# Patient Record
Sex: FEMALE | Race: WHITE | NOT HISPANIC OR LATINO | Employment: OTHER | URBAN - METROPOLITAN AREA
[De-identification: names, ages, dates, MRNs, and addresses within clinical notes are randomized per-mention and may not be internally consistent; named-entity substitution may affect disease eponyms.]

---

## 2017-02-02 ENCOUNTER — LAB VISIT (OUTPATIENT)
Dept: LAB | Facility: HOSPITAL | Age: 67
End: 2017-02-02
Payer: MEDICARE

## 2017-02-02 DIAGNOSIS — E78.00 HYPERCHOLESTEROLEMIA: ICD-10-CM

## 2017-02-02 LAB
ALBUMIN SERPL BCP-MCNC: 3.8 G/DL
ALP SERPL-CCNC: 57 U/L
ALT SERPL W/O P-5'-P-CCNC: 29 U/L
ANION GAP SERPL CALC-SCNC: 9 MMOL/L
AST SERPL-CCNC: 23 U/L
BILIRUB SERPL-MCNC: 0.6 MG/DL
BUN SERPL-MCNC: 22 MG/DL
CALCIUM SERPL-MCNC: 9.1 MG/DL
CHLORIDE SERPL-SCNC: 109 MMOL/L
CHOLEST/HDLC SERPL: 3.1 {RATIO}
CK SERPL-CCNC: 53 U/L
CO2 SERPL-SCNC: 24 MMOL/L
CREAT SERPL-MCNC: 0.9 MG/DL
EST. GFR  (AFRICAN AMERICAN): >60 ML/MIN/1.73 M^2
EST. GFR  (NON AFRICAN AMERICAN): >60 ML/MIN/1.73 M^2
GLUCOSE SERPL-MCNC: 97 MG/DL
HDL/CHOLESTEROL RATIO: 32.6 %
HDLC SERPL-MCNC: 175 MG/DL
HDLC SERPL-MCNC: 57 MG/DL
LDLC SERPL CALC-MCNC: 101.6 MG/DL
NONHDLC SERPL-MCNC: 118 MG/DL
POTASSIUM SERPL-SCNC: 4.4 MMOL/L
PROT SERPL-MCNC: 6.5 G/DL
SODIUM SERPL-SCNC: 142 MMOL/L
TRIGL SERPL-MCNC: 82 MG/DL

## 2017-02-02 PROCEDURE — 80061 LIPID PANEL: CPT

## 2017-02-02 PROCEDURE — 80053 COMPREHEN METABOLIC PANEL: CPT

## 2017-02-02 PROCEDURE — 82550 ASSAY OF CK (CPK): CPT

## 2017-02-02 PROCEDURE — 36415 COLL VENOUS BLD VENIPUNCTURE: CPT

## 2017-02-06 ENCOUNTER — OFFICE VISIT (OUTPATIENT)
Dept: INFECTIOUS DISEASES | Facility: CLINIC | Age: 67
End: 2017-02-06
Payer: MEDICARE

## 2017-02-06 VITALS
BODY MASS INDEX: 19.57 KG/M2 | HEIGHT: 63 IN | SYSTOLIC BLOOD PRESSURE: 125 MMHG | DIASTOLIC BLOOD PRESSURE: 79 MMHG | WEIGHT: 110.44 LBS | TEMPERATURE: 98 F | HEART RATE: 84 BPM

## 2017-02-06 DIAGNOSIS — C50.412 BREAST CANCER OF UPPER-OUTER QUADRANT OF LEFT FEMALE BREAST: ICD-10-CM

## 2017-02-06 DIAGNOSIS — E78.00 PURE HYPERCHOLESTEROLEMIA: ICD-10-CM

## 2017-02-06 DIAGNOSIS — I25.10 ARTERIOSCLEROTIC CORONARY ARTERY DISEASE: Primary | ICD-10-CM

## 2017-02-06 PROCEDURE — 99213 OFFICE O/P EST LOW 20 MIN: CPT | Mod: PBBFAC | Performed by: INTERNAL MEDICINE

## 2017-02-06 PROCEDURE — 99214 OFFICE O/P EST MOD 30 MIN: CPT | Mod: S$PBB,,, | Performed by: INTERNAL MEDICINE

## 2017-02-06 PROCEDURE — 99999 PR PBB SHADOW E&M-EST. PATIENT-LVL III: CPT | Mod: PBBFAC,,, | Performed by: INTERNAL MEDICINE

## 2017-02-06 RX ORDER — ATORVASTATIN CALCIUM 20 MG/1
20 TABLET, FILM COATED ORAL DAILY
Qty: 30 TABLET | Refills: 11 | Status: SHIPPED | OUTPATIENT
Start: 2017-02-06 | End: 2018-02-11 | Stop reason: SDUPTHER

## 2017-02-06 NOTE — MR AVS SNAPSHOT
[facility] - Infectious Diseases  [address] [facility] [address]  Phone: [phone]  Fax: [phone]                  Saritha Guzman   2017 9:00 AM   Office Visit    Description:  Female : 1950   Provider:  Marlon Richardson MD   Department:  [facility] - Infectious Diseases           Reason for Visit     Follow-up                To Do List           Future Appointments        Provider Department Dept Phone    2017 10:30 AM NA [facility] - Hematology Oncology [phone]      Goals (5 Years of Data)     None       These Medications        Disp Refills Start End    atorvastatin (LIPITOR) 20 MG tablet 30 tablet 11 2017    Take 1 tablet (20 mg total) by mouth once daily. - Oral    Pharmacy: [facility] [address] [place] [address] [employer]  AT [facility] Ph #: [phone]         [facility] On Call     [facility] On Call Nurse Care Line -  Assistance  Registered nurses in the [facility] On Call Center provide clinical advisement, health education, appointment booking, and other advisory services.  Call for this free service at [phone].             Medications           Message regarding Medications     Verify the changes and/or additions to your medication regime listed below are the same as discussed with your clinician today.  If any of these changes or additions are incorrect, please notify your healthcare provider.        START taking these NEW medications        Refills    atorvastatin (LIPITOR) 20 MG tablet 11    Sig: Take 1 tablet (20 mg total) by mouth once daily.    Class: Normal    Route: Oral           Verify that the below list of medications is an accurate representation of the medications you are currently taking.  If none reported, the list may be blank. If incorrect, please contact your healthcare provider. Carry this list with you in case of emergency.           Current Medications      "ascorbic acid (VITAMIN C) 1000 MG tablet Take 1,000 mg by mouth once daily.    calcium-vitamin D3 (CALCIUM 500 + D) 500 mg(1,250mg) -200 unit per tablet Take 1 tablet by mouth 2 (two) times daily with meals.    cyanocobalamin (VITAMIN B-12) 100 MCG tablet Take 100 mcg by mouth once daily.    DOCOSAHEXANOIC ACID/EPA (FISH OIL ORAL) Take by mouth.    FLUZONE HIGH-DOSE 2016-17, PF, 180 mcg/0.5 mL Syrg ADM 0.5ML IM UTD    letrozole (FEMARA) 2.5 mg Tab Take 2.5 mg by mouth once daily.    lorazepam (ATIVAN) 1 MG tablet Take 1 tablet (1 mg total) by mouth every 12 (twelve) hours as needed.    magnesium oxide (MAGOX) 400 mg tablet Take 1 tablet (400 mg total) by mouth 2 (two) times daily.    MULTIVITAMIN ORAL Take by mouth.    RED YEAST RICE ORAL Take 1,200 mg by mouth.    VAGIFEM 10 mcg Tab INSERT 1 TABLET VAGINALLY EVERY DAY FOR 1 WEEK, THEN TWICE A WEEK    atorvastatin (LIPITOR) 20 MG tablet Take 1 tablet (20 mg total) by mouth once daily.           Clinical Reference Information           Your Vitals Were     BP Pulse Temp Height Weight BMI    125/79 (BP Location: Left arm, Patient Position: Sitting) 84 97.8 °F (36.6 °C) (Oral) 5' 3" (1.6 m) 50.1 kg (110 lb 7.2 oz) 19.57 kg/m2      Blood Pressure          Most Recent Value    BP  125/79      Allergies as of 2/6/2017     Codeine    Corticosteroids (Glucocorticoids)    Penicillins    Tetracyclines      Immunizations Administered on Date of Encounter - 2/6/2017     None      Language Assistance Services     ATTENTION: Language assistance services are available, free of charge. Please call [phone].      ATENCIÓN: Si monyla brady, tiene a mcgregor disposición servicios gratuitos de asistencia lingüística. Llame al [phone].     HUEY Ý: N?u b?n nói Ti?ng Vi?t, có các d?ch v? h? tr? ngôn ng? mi?n phí dành cho b?n. G?i s? [phone].         [facility] - [facility]ious [facility] complies with applicable Federal civil rights laws and does not discriminate on the basis of " race, color, national origin, age, disability, or sex.

## 2017-02-06 NOTE — PROGRESS NOTES
Subjective:      Patient ID: Saritha Guzman is a 66 y.o. female.    Chief Complaint:Follow-up      History of Present Illness    Has positive coronary calcium score and strong family hx of CAD. She was placed on atorvastatin 10 mg in mid Dec and has had no tolerance problems. Marked improvement in her lipid profile:  Lab Results   Component Value Date    CHOL 175 02/02/2017    CHOL 284 (H) 10/25/2016    CHOL 230 (H) 08/12/2015     Lab Results   Component Value Date    HDL 57 02/02/2017    HDL 61 10/25/2016    HDL 58 08/12/2015     Lab Results   Component Value Date    LDLCALC 101.6 02/02/2017    LDLCALC 201.2 (H) 10/25/2016    LDLCALC 152.0 08/12/2015     Lab Results   Component Value Date    TRIG 82 02/02/2017    TRIG 109 10/25/2016    TRIG 100 08/12/2015     Lab Results   Component Value Date    CHOLHDL 32.6 02/02/2017    CHOLHDL 21.5 10/25/2016    CHOLHDL 25.2 08/12/2015     Since target LDL is 70 will double her atorvastatin dose and reassess in 2 months. She inquired about CoQ 10 which I recommended she start.    Review of Systems   Constitution: Positive for night sweats. Negative for chills, decreased appetite, fever, weakness, malaise/fatigue, weight gain and weight loss.   HENT: Positive for congestion. Negative for ear pain, headaches, hearing loss, hoarse voice, sore throat and tinnitus.    Eyes: Negative for blurred vision, redness and visual disturbance.   Cardiovascular: Negative for chest pain, leg swelling and palpitations.   Respiratory: Negative for cough, hemoptysis, shortness of breath and sputum production.    Hematologic/Lymphatic: Negative for adenopathy. Does not bruise/bleed easily.   Skin: Positive for dry skin. Negative for itching, rash and suspicious lesions.   Musculoskeletal: Positive for back pain. Negative for joint pain, myalgias and neck pain.   Gastrointestinal: Positive for diarrhea. Negative for abdominal pain, constipation, heartburn, nausea and vomiting.   Genitourinary:  Negative for dysuria, flank pain, frequency, hematuria, hesitancy and urgency.   Neurological: Negative for dizziness, numbness and paresthesias.   Psychiatric/Behavioral: Negative for depression and memory loss. The patient is nervous/anxious. The patient does not have insomnia.      Objective:   Physical Exam   Vitals reviewed.    Assessment:       1. Arteriosclerotic coronary artery disease    2. Pure hypercholesterolemia    3. Breast cancer of upper-outer quadrant of left female breast          Plan:        1. See HPI

## 2017-02-08 ENCOUNTER — OFFICE VISIT (OUTPATIENT)
Dept: OPHTHALMOLOGY | Facility: CLINIC | Age: 67
End: 2017-02-08
Payer: MEDICARE

## 2017-02-08 DIAGNOSIS — H00.13 CHALAZION, RIGHT: Primary | ICD-10-CM

## 2017-02-08 PROCEDURE — 99212 OFFICE O/P EST SF 10 MIN: CPT | Mod: PBBFAC

## 2017-02-08 PROCEDURE — 92002 INTRM OPH EXAM NEW PATIENT: CPT | Mod: S$PBB,,,

## 2017-02-08 PROCEDURE — 99999 PR PBB SHADOW E&M-EST. PATIENT-LVL II: CPT | Mod: PBBFAC,,,

## 2017-02-08 NOTE — MR AVS SNAPSHOT
[facility] - Ophthalmology  [address]  [facility] [address]  Phone: [phone]  Fax: [phone]                  Saritha Guzman   2017 10:15 AM   Office Visit    Description:  Female : 1950   Provider:  Cuco Cyr MD   Department:  [facility] - Ophthalmology           Reason for Visit     Stye           Diagnoses this Visit        Comments    Chalazion, right    -  Primary            To Do List           Future Appointments        Provider Department Dept Phone    2017 10:30 AM NA [facility] - Hematology Oncology [phone]    3/13/2017 7:30 AM LAB, APPOINTMENT [place] [facility] [phone]    3/13/2017 9:00 AM Marlon Richardson MD [facility] - Infectious Diseases [phone]      Goals (5 Years of Data)     None      [facility]     [facility] On Call Nurse Care Line -  Assistance  Registered nurses in the [facility] On Call Center provide clinical advisement, health education, appointment booking, and other advisory services.  Call for this free service at [phone].             Medications           Message regarding Medications     Verify the changes and/or additions to your medication regime listed below are the same as discussed with your clinician today.  If any of these changes or additions are incorrect, please notify your healthcare provider.        STOP taking these medications     FLUZONE HIGH-DOSE -, PF, 180 mcg/0.5 mL Syrg ADM 0.5ML IM UTD    RED YEAST RICE ORAL Take 1,200 mg by mouth.           Verify that the below list of medications is an accurate representation of the medications you are currently taking.  If none reported, the list may be blank. If incorrect, please contact your healthcare provider. Carry this list with you in case of emergency.           Current Medications     ascorbic acid (VITAMIN C) 1000 MG tablet Take 1,000 mg by mouth once daily.    atorvastatin (LIPITOR) 20 MG tablet  Take 1 tablet (20 mg total) by mouth once daily.    calcium-vitamin D3 (CALCIUM 500 + D) 500 mg(1,250mg) -200 unit per tablet Take 1 tablet by mouth 2 (two) times daily with meals.    cyanocobalamin (VITAMIN B-12) 100 MCG tablet Take 100 mcg by mouth once daily.    DOCOSAHEXANOIC ACID/EPA (FISH OIL ORAL) Take by mouth.    letrozole (FEMARA) 2.5 mg Tab Take 2.5 mg by mouth once daily.    lorazepam (ATIVAN) 1 MG tablet Take 1 tablet (1 mg total) by mouth every 12 (twelve) hours as needed.    magnesium oxide (MAGOX) 400 mg tablet Take 1 tablet (400 mg total) by mouth 2 (two) times daily.    MULTIVITAMIN ORAL Take by mouth.    VAGIFEM 10 mcg Tab INSERT 1 TABLET VAGINALLY EVERY DAY FOR 1 WEEK, THEN TWICE A WEEK           Clinical Reference Information           Allergies as of 2/8/2017     Codeine    Corticosteroids (Glucocorticoids)    Penicillins    Tetracyclines      Immunizations Administered on Date of Encounter - 2/8/2017     None      Instructions    Use ointment and warm compress 3-4 times a day.        Language Assistance Services     ATTENTION: Language assistance services are available, free of charge. Please call [phone].      ATENCIÓN: Si anna marie brady, tiene a mcgregor disposición servicios gratuitos de asistencia lingüística. Llame al [phone].     HUEY Ý: N?u b?n nói Ti?ng Vi?t, có các d?ch v? h? tr? ngôn ng? mi?n phí dành cho b?n. G?i s? [phone].         [facility] complies with applicable Federal civil rights laws and does not discriminate on the basis of race, color, national origin, age, disability, or sex.

## 2017-02-08 NOTE — PROGRESS NOTES
HPI     Patient complaints of OD tender and swollen w/ discharge x2d. Pt states   has gotten worse very quickly very sore and red. Pt started warm   compresses qhs on yesterday.            Last edited by Daniel Acuna MA on 2/8/2017 10:47 AM.         Assessment /Plan     For exam results, see Encounter Report.    [place], right        I have reviewed the patient history,review of systems,and findings as recorded by the technician and resident and accept.  Subacute chalazion lat RLL. Rx arm comp and maxitrol ungt 3-4 times daily.

## 2017-02-21 ENCOUNTER — OFFICE VISIT (OUTPATIENT)
Dept: HEMATOLOGY/ONCOLOGY | Facility: CLINIC | Age: 67
End: 2017-02-21
Payer: MEDICARE

## 2017-02-21 VITALS
DIASTOLIC BLOOD PRESSURE: 61 MMHG | SYSTOLIC BLOOD PRESSURE: 116 MMHG | WEIGHT: 110.69 LBS | TEMPERATURE: 98 F | HEART RATE: 85 BPM | RESPIRATION RATE: 16 BRPM | BODY MASS INDEX: 19.6 KG/M2

## 2017-02-21 DIAGNOSIS — C50.412 BREAST CANCER OF UPPER-OUTER QUADRANT OF LEFT FEMALE BREAST: Primary | ICD-10-CM

## 2017-02-21 PROCEDURE — 99213 OFFICE O/P EST LOW 20 MIN: CPT | Mod: PBBFAC | Performed by: PHYSICIAN ASSISTANT

## 2017-02-21 PROCEDURE — 99213 OFFICE O/P EST LOW 20 MIN: CPT | Mod: S$PBB,,, | Performed by: PHYSICIAN ASSISTANT

## 2017-02-21 PROCEDURE — 99999 PR PBB SHADOW E&M-EST. PATIENT-LVL III: CPT | Mod: PBBFAC,,, | Performed by: PHYSICIAN ASSISTANT

## 2017-02-21 RX ORDER — ACETAMINOPHEN 160 MG/5ML
200 SUSPENSION, ORAL (FINAL DOSE FORM) ORAL DAILY
COMMUNITY

## 2017-02-21 NOTE — PROGRESS NOTES
Subjective:       Patient ID: Saritha Guzman is a 66 y.o. female.    Chief Complaint: Breast Cancer    HPI Comments: Ms Guzman is a 67 y/o WF Is seen for F/U of left breast cancer. She had stage I ER positive disease with a high Oncotype score. She is currently on letrozole.    She is feeling well and is without complaints, specifically no fever, chills, nausea, vomiting, shortness of breath or breast pain/complaints.  Patient recently found to have high cholesterol, which has been greatly improved with lipitor.    Bilateral breast MRI in 12/2016 was negative.     History: mammogram showed pleomorphic calcifications left breast at the 2:00 position. By ultrasound there was a 2.4 so that the asymmetric density. She was having no breast symptoms at that time. A core needle biopsy showed LCIS and an MRI of the breast showed an irregular mass at the 2:00 position measured 3.9 x 2.1 cm with no adenopathy. 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 On July 12 Lumpectomy showed multiple foci of infiltrating lobular carcinoma, With the largest measuring 1.1 cm. histologic grade 3 nuclear grade 2 mitotic index 2. The tumor was 50% ER positive-which would've moderate, VA negative and HER-2 negative. Subsequently she underwent a left mastectomy with sentinel lymph node biopsy which showed only some residual LCIS. The sentinel lymph node was negative. Final pathological stage TI CN0 stage IA. Oncotype score was high. She had 4 cycles of adjuvant Taxotere and Cytoxan completed January 2014 and then began adjuvant letrozole therapy.    Review of Systems   Constitutional: Negative.    HENT: Negative for mouth sores, sore throat and trouble swallowing.    Eyes: Negative for visual disturbance.   Respiratory: Negative for cough, chest tightness and shortness of breath.    Cardiovascular: Negative for chest pain and leg swelling.   Gastrointestinal: Negative for  abdominal distention, abdominal pain, blood in stool, constipation, diarrhea, nausea and vomiting.   Genitourinary: Negative for dysuria and hematuria.   Musculoskeletal: Negative for arthralgias, back pain and myalgias.   Skin: Negative for pallor and rash.   Neurological: Negative for dizziness, weakness and light-headedness.   Hematological: Negative for adenopathy. Does not bruise/bleed easily.   Psychiatric/Behavioral: Negative for dysphoric mood and suicidal ideas. The patient is not nervous/anxious.        Objective:      Physical Exam   Constitutional: She is oriented to person, place, and time. She appears well-developed and well-nourished. No distress.   ECOG 0   HENT:   Head: Normocephalic.   Mouth/Throat: Oropharynx is clear and moist. No oropharyngeal exudate.   Eyes: Conjunctivae are normal. No scleral icterus.   Neck: Normal range of motion. Neck supple. No thyromegaly present.   Pulmonary/Chest: Effort normal and breath sounds normal. No respiratory distress.   Right breast without mass or nodule. Left breast reconstruction shows well healed lateral incision, no mass or nodule. Implant with some mild contracture at lateral incision line No axillary or supraclavicular adenopathy bilaterally.    Abdominal: Soft. Bowel sounds are normal. She exhibits no distension and no mass. There is no tenderness.   No hepatosplenomegaly   Musculoskeletal: Normal range of motion. She exhibits no edema.   No spinal or paraspinal tenderness to palpation     Lymphadenopathy:     She has no cervical adenopathy.   Neurological: She is alert and oriented to person, place, and time. No cranial nerve deficit.   Skin: Skin is warm and dry. No rash noted. No erythema.   Psychiatric: She has a normal mood and affect. Her behavior is normal. Judgment and thought content normal.   Vitals reviewed.      Assessment:       66 year old female with stage IA (ER positive) breast cancer, currently on Femara and tolerating that treatment  well.  Patient with osteoporosis treated with Reclast, due 11/2017.        PLAN:  Continue Femara and return to clinic in 4 months.  Patient gets alternating MRI/mammogram every 6 months, due for mammogram in June.

## 2017-03-13 ENCOUNTER — OFFICE VISIT (OUTPATIENT)
Dept: INFECTIOUS DISEASES | Facility: CLINIC | Age: 67
End: 2017-03-13
Payer: MEDICARE

## 2017-03-13 ENCOUNTER — PATIENT MESSAGE (OUTPATIENT)
Dept: INFECTIOUS DISEASES | Facility: CLINIC | Age: 67
End: 2017-03-13

## 2017-03-13 VITALS
HEIGHT: 63 IN | DIASTOLIC BLOOD PRESSURE: 71 MMHG | BODY MASS INDEX: 20 KG/M2 | WEIGHT: 112.88 LBS | SYSTOLIC BLOOD PRESSURE: 114 MMHG | HEART RATE: 88 BPM | TEMPERATURE: 98 F

## 2017-03-13 DIAGNOSIS — I25.10 ARTERIOSCLEROTIC CORONARY ARTERY DISEASE: ICD-10-CM

## 2017-03-13 PROCEDURE — 99213 OFFICE O/P EST LOW 20 MIN: CPT | Mod: PBBFAC | Performed by: INTERNAL MEDICINE

## 2017-03-13 PROCEDURE — 99213 OFFICE O/P EST LOW 20 MIN: CPT | Mod: S$PBB,,, | Performed by: INTERNAL MEDICINE

## 2017-03-13 PROCEDURE — 99999 PR PBB SHADOW E&M-EST. PATIENT-LVL III: CPT | Mod: PBBFAC,,, | Performed by: INTERNAL MEDICINE

## 2017-03-13 NOTE — ASSESSMENT & PLAN NOTE
Pt has strong family history of stroke. In 2008 she had a coronary calcium score which showed her to be between the 50-75%ile for age/gender. She recalls being seen by Dr. Sanchez and given crestor which made her joints hurt. None of this is viewable in existing computer records, so will request record from [email or web address] to review. She was started on letrozole Jan 2014 and has had a big jump in her cholesterol and LDL. Was started on atorvastatin 10 mg with big drop in total from 284 to 175 and the dose increased to 20 mg with further drop  Lab Results   Component Value Date    CHOL 158 03/13/2017    CHOL 175 02/02/2017    CHOL 284 (H) 10/25/2016     Lab Results   Component Value Date    HDL 51 03/13/2017    HDL 57 02/02/2017    HDL 61 10/25/2016     Lab Results   Component Value Date    LDLCALC 90.4 03/13/2017    LDLCALC 101.6 02/02/2017    LDLCALC 201.2 (H) 10/25/2016     Lab Results   Component Value Date    TRIG 83 03/13/2017    TRIG 82 02/02/2017    TRIG 109 10/25/2016     Lab Results   Component Value Date    CHOLHDL 32.3 03/13/2017    CHOLHDL 32.6 02/02/2017    CHOLHDL 21.5 10/25/2016

## 2017-03-13 NOTE — MR AVS SNAPSHOT
[facility] - Infectious Diseases  [address]  [facility] [address]  Phone: [phone]  Fax: [phone]                  Saritha Guzman   3/13/2017 9:00 AM   Office Visit    Description:  Female : 1950   Provider:  Marlon Richardson MD   Department:  [facility] - Infectious Diseases           Reason for Visit     Follow-up                To Do List           Goals (5 Years of Data)     None      [facility] On Call     [facility] On Call Nurse Care Line -  Assistance  Registered nurses in the [facility] On Call Center provide clinical advisement, health education, appointment booking, and other advisory services.  Call for this free service at [phone].             Medications           Message regarding Medications     Verify the changes and/or additions to your medication regime listed below are the same as discussed with your clinician today.  If any of these changes or additions are incorrect, please notify your healthcare provider.             Verify that the below list of medications is an accurate representation of the medications you are currently taking.  If none reported, the list may be blank. If incorrect, please contact your healthcare provider. Carry this list with you in case of emergency.           Current Medications     ascorbic acid (VITAMIN C) 1000 MG tablet Take 1,000 mg by mouth once daily.    atorvastatin (LIPITOR) 20 MG tablet Take 1 tablet (20 mg total) by mouth once daily.    calcium-vitamin D3 (CALCIUM 500 + D) 500 mg(1,250mg) -200 unit per tablet Take 1 tablet by mouth 2 (two) times daily with meals.    coenzyme Q10 (CO Q-10) 200 mg capsule Take 200 mg by mouth once daily.    cyanocobalamin (VITAMIN B-12) 100 MCG tablet Take 100 mcg by mouth once daily.    DOCOSAHEXANOIC ACID/EPA (FISH OIL ORAL) Take by mouth.    letrozole (FEMARA) 2.5 mg Tab Take 2.5 mg by mouth once daily.    lorazepam (ATIVAN) 1 MG tablet Take 1 tablet (1 mg total) by mouth every 12 (twelve)  "hours as needed.    magnesium oxide (MAGOX) 400 mg tablet Take 1 tablet (400 mg total) by mouth 2 (two) times daily.    MULTIVITAMIN ORAL Take by mouth.    VAGIFEM 10 mcg Tab INSERT 1 TABLET VAGINALLY EVERY DAY FOR 1 WEEK, THEN TWICE A WEEK           Clinical Reference Information           Your Vitals Were     BP Pulse Temp Height Weight BMI    114/71 (BP Location: Right arm, Patient Position: Sitting) 88 98.1 °F (36.7 °C) (Oral) 5' 3" (1.6 m) 51.2 kg (112 lb 14 oz) 20 kg/m2      Blood Pressure          Most Recent Value    BP  114/71      Allergies as of 3/13/2017     Codeine    Corticosteroids (Glucocorticoids)    Penicillins    Tetracyclines      Immunizations Administered on Date of Encounter - 3/13/2017     None      Language Assistance Services     ATTENTION: Language assistance services are available, free of charge. Please call [phone].      ATENCIÓN: Si habla brady, tiene a mcgregor disposición servicios gratuitos de asistencia lingüística. Llame al [phone].     HUEY Ý: N?u b?n nói Ti?ng Vi?t, có các d?ch v? h? tr? ngôn ng? mi?n phí dành cho b?n. G?i s? [phone].         [facility] - [facility]ious [facility] complies with applicable Federal civil rights laws and does not discriminate on the basis of race, color, national origin, age, disability, or sex.        "

## 2017-03-13 NOTE — PROGRESS NOTES
Subjective:      Patient ID: Saritha Guzman is a 66 y.o. female.    Chief Complaint:Follow-up      History of Present Illness    Arteriosclerotic coronary artery disease   Pt has strong family history of stroke. In 2008 she had a coronary calcium score which showed her to be between the 50-75%ile for age/gender. She recalls being seen by Dr. Sanchez and given crestor which made her joints hurt. None of this is viewable in existing computer records, so [facility]. She was started on letrozole Jan 2014 and has had a big jump in her cholesterol and LDL. Was started on atorvastatin 10 mg with big drop in total from 284 to 175 and the dose increased to 20 mg with further drop  Lab Results   Component Value Date    CHOL 158 03/13/2017    CHOL 175 02/02/2017    CHOL 284 (H) 10/25/2016     Lab Results   Component Value Date    HDL 51 03/13/2017    HDL 57 02/02/2017    HDL 61 10/25/2016     Lab Results   Component Value Date    LDLCALC 90.4 03/13/2017    LDLCALC 101.6 02/02/2017    LDLCALC 201.2 (H) 10/25/2016     Lab Results   Component Value Date    TRIG 83 03/13/2017    TRIG 82 02/02/2017    TRIG 109 10/25/2016     Lab Results   Component Value Date    CHOLHDL 32.3 03/13/2017    CHOLHDL 32.6 02/02/2017    CHOLHDL 21.5 10/25/2016       previously had adverse effects from crestor (musculoskeletal) so was off statins for years.      Review of Systems   Constitution: Positive for night sweats. Negative for chills, decreased appetite, fever, weakness, malaise/fatigue, weight gain and weight loss.   HENT: Positive for congestion. Negative for ear pain, headaches, hearing loss, hoarse voice, sore throat and tinnitus.    Eyes: Negative for blurred vision, redness and visual disturbance.   Cardiovascular: Negative for chest pain, leg swelling and palpitations.   Respiratory: Negative for cough, hemoptysis, shortness of breath and sputum production.    Hematologic/Lymphatic: Negative for adenopathy.  Does not bruise/bleed easily.   Skin: Negative for dry skin, itching, rash and suspicious lesions.   Musculoskeletal: Negative for back pain, joint pain, myalgias and neck pain.   Gastrointestinal: Negative for abdominal pain, constipation, diarrhea, heartburn, nausea and vomiting.   Genitourinary: Positive for frequency. Negative for dysuria, flank pain, hematuria, hesitancy and urgency.   Neurological: Negative for dizziness, numbness and paresthesias.   Psychiatric/Behavioral: Negative for depression and memory loss. The patient does not have insomnia and is not nervous/anxious.      Objective:   Physical Exam   Vitals reviewed.    Assessment:       1. Arteriosclerotic coronary artery disease by coronary calcium score (asymptomatic)     2. Good lipid control on present atrovastatin dose    Plan:        1. Continue present rx   2. Will check with Dr. Sanchez re advice on lipid management

## 2017-03-14 ENCOUNTER — PATIENT MESSAGE (OUTPATIENT)
Dept: INFECTIOUS DISEASES | Facility: CLINIC | Age: 67
End: 2017-03-14

## 2017-04-17 DIAGNOSIS — N95.2 POSTMENOPAUSAL ATROPHIC VAGINITIS: ICD-10-CM

## 2017-04-17 RX ORDER — ESTRADIOL 10 UG/1
INSERT VAGINAL
Qty: 14 TABLET | Refills: 0 | Status: SHIPPED | OUTPATIENT
Start: 2017-04-17 | End: 2018-07-02

## 2017-05-01 ENCOUNTER — PATIENT MESSAGE (OUTPATIENT)
Dept: HEMATOLOGY/ONCOLOGY | Facility: CLINIC | Age: 67
End: 2017-05-01

## 2017-05-01 DIAGNOSIS — F41.9 ANXIETY: ICD-10-CM

## 2017-05-01 RX ORDER — LORAZEPAM 1 MG/1
1 TABLET ORAL EVERY 12 HOURS PRN
Qty: 30 TABLET | Refills: 2 | Status: SHIPPED | OUTPATIENT
Start: 2017-05-01 | End: 2018-12-31 | Stop reason: SDUPTHER

## 2017-06-28 ENCOUNTER — OFFICE VISIT (OUTPATIENT)
Dept: HEMATOLOGY/ONCOLOGY | Facility: CLINIC | Age: 67
End: 2017-06-28
Payer: MEDICARE

## 2017-06-28 ENCOUNTER — HOSPITAL ENCOUNTER (OUTPATIENT)
Dept: RADIOLOGY | Facility: HOSPITAL | Age: 67
Discharge: HOME | End: 2017-06-28
Attending: INTERNAL MEDICINE
Payer: MEDICARE

## 2017-06-28 VITALS — BODY MASS INDEX: 19.84 KG/M2 | WEIGHT: 112 LBS | HEIGHT: 63 IN

## 2017-06-28 VITALS
SYSTOLIC BLOOD PRESSURE: 112 MMHG | BODY MASS INDEX: 19.53 KG/M2 | WEIGHT: 110.25 LBS | DIASTOLIC BLOOD PRESSURE: 61 MMHG | RESPIRATION RATE: 16 BRPM | HEART RATE: 67 BPM | TEMPERATURE: 98 F

## 2017-06-28 DIAGNOSIS — C50.412 BREAST CANCER OF UPPER-OUTER QUADRANT OF LEFT FEMALE BREAST: ICD-10-CM

## 2017-06-28 DIAGNOSIS — M81.0 OSTEOPOROSIS, UNSPECIFIED OSTEOPOROSIS TYPE, UNSPECIFIED PATHOLOGICAL FRACTURE PRESENCE: Primary | ICD-10-CM

## 2017-06-28 PROCEDURE — 1159F MED LIST DOCD IN RCRD: CPT | Mod: ,,, | Performed by: PHYSICIAN ASSISTANT

## 2017-06-28 PROCEDURE — 99213 OFFICE O/P EST LOW 20 MIN: CPT | Mod: S$PBB,,, | Performed by: PHYSICIAN ASSISTANT

## 2017-06-28 PROCEDURE — 99213 OFFICE O/P EST LOW 20 MIN: CPT | Mod: PBBFAC | Performed by: PHYSICIAN ASSISTANT

## 2017-06-28 PROCEDURE — 77065 DX MAMMO INCL CAD UNI: CPT | Mod: 26,,, | Performed by: RADIOLOGY

## 2017-06-28 PROCEDURE — 99999 PR PBB SHADOW E&M-EST. PATIENT-LVL III: CPT | Mod: PBBFAC,,, | Performed by: PHYSICIAN ASSISTANT

## 2017-06-28 PROCEDURE — 1126F AMNT PAIN NOTED NONE PRSNT: CPT | Mod: ,,, | Performed by: PHYSICIAN ASSISTANT

## 2017-06-28 PROCEDURE — 77061 BREAST TOMOSYNTHESIS UNI: CPT | Mod: 26,,, | Performed by: RADIOLOGY

## 2017-06-28 NOTE — Clinical Note
Bone density in the next couple of weeks (doesn't have to be specific date) Breast MRI and Martin in 6 months

## 2017-06-28 NOTE — PROGRESS NOTES
Subjective:       Patient ID: Saritha Guzman is a 67 y.o. female.    Chief Complaint: No chief complaint on file.    Ms Guzman is a 66 y/o WF Is seen for F/U of left breast cancer. She had stage I ER positive disease with a high Oncotype score. She is currently on letrozole.    She is feeling well and is without complaints, specifically no fever, chills, nausea, vomiting, shortness of breath or breast pain/complaints.  Just returned from family trip to [place].     Mammogram from today:  Impression:  No mammographic evidence of malignancy.  BI-RADS Category 1: Negative  Recommendation:  Routine Screening Mammogram in 1 year is recommended for the right breast.       History: mammogram showed pleomorphic calcifications left breast at the 2:00 position. By ultrasound there was a 2.4 so that the asymmetric density. She was having no breast symptoms at that time. A core needle biopsy showed LCIS and an MRI of the breast showed an irregular mass at the 2:00 position measured 3.9 x 2.1 cm with no adenopathy. 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 On July 12 Lumpectomy showed multiple foci of infiltrating lobular carcinoma, With the largest measuring 1.1 cm. histologic grade 3 nuclear grade 2 mitotic index 2. The tumor was 50% ER positive-which would've moderate, [place] negative and HER-2 negative. Subsequently she underwent a left mastectomy with sentinel lymph node biopsy which showed only some residual LCIS. The sentinel lymph node was negative. Final pathological stage TI CN0 stage IA. Oncotype score was high. She had 4 cycles of adjuvant Taxotere and Cytoxan completed January 2014 and then began adjuvant letrozole therapy.      Review of Systems   Constitutional: Negative.    HENT: Negative for mouth sores, sore throat and trouble swallowing.    Eyes: Negative for visual disturbance.   Respiratory: Negative for cough, chest tightness and shortness of  breath.    Cardiovascular: Negative for chest pain and leg swelling.   Gastrointestinal: Negative for abdominal distention, abdominal pain, blood in stool, constipation, diarrhea, nausea and vomiting.   Genitourinary: Negative for dysuria and hematuria.   Musculoskeletal: Negative for arthralgias, back pain and myalgias.   Skin: Negative for pallor and rash.   Neurological: Negative for dizziness, weakness and light-headedness.   Hematological: Negative for adenopathy. Does not bruise/bleed easily.   Psychiatric/Behavioral: Negative for dysphoric mood and suicidal ideas. The patient is not nervous/anxious.        Objective:      Physical Exam   Constitutional: She is oriented to person, place, and time. She appears well-developed and well-nourished. No distress.   ECOG 0  Presents alone     HENT:   Head: Normocephalic.   Mouth/Throat: Oropharynx is clear and moist. No oropharyngeal exudate.   Eyes: Conjunctivae are normal. No scleral icterus.   Neck: Normal range of motion. Neck supple. No thyromegaly present.   Pulmonary/Chest: Effort normal and breath sounds normal. No respiratory distress.   Right breast without mass or nodule. Left breast reconstruction shows well healed lateral incision, no mass or nodule. Implant with some mild contracture at lateral incision line No axillary or supraclavicular adenopathy bilaterally.    Abdominal: Soft. Bowel sounds are normal. She exhibits no distension and no mass. There is no tenderness.   No hepatosplenomegaly   Musculoskeletal: Normal range of motion. She exhibits no edema.   No spinal or paraspinal tenderness to palpation     Lymphadenopathy:     She has no cervical adenopathy.   Neurological: She is alert and oriented to person, place, and time. No cranial nerve deficit.   Skin: Skin is warm and dry. No rash noted. No erythema.   Psychiatric: She has a normal mood and affect. Her behavior is normal. Judgment and thought content normal.   Vitals reviewed.       Assessment:       7year old female with stage IA (ER positive) breast cancer, currently on Femara and tolerating that treatment well.  Patient with osteoporosis treated with Reclast, due 11/2017.        PLAN:  Continue Femara and return to clinic in 4 months.  Patient gets alternating MRI/mammogram every 6 months, due for MRI in December.  Patient due for repeat bone density, referral has been submitted.

## 2017-07-12 NOTE — TELEPHONE ENCOUNTER
----- Message from Courtney Lopez sent at 7/10/2017 11:36 AM CDT -----  Contact: [facility] [id number]   _x  1st Request  _  2nd Request  _  3rd Request        Who: [email or web address] [phone]     Why: Pharmacy would like to speak with nurse in regards to medications for patient. Patient is requesting a 90 day supply on Femara 2.5 MG and the generic for Vagifem 10 mcg. Please call pharmacy to authorize.      What Number to Call Back: [phone]     When to Expect a call back: (Before the end of the day)   -- if call after 3:00 call back will be tomorrow.

## 2017-07-17 ENCOUNTER — HOSPITAL ENCOUNTER (OUTPATIENT)
Dept: RADIOLOGY | Facility: CLINIC | Age: 67
Discharge: HOME | End: 2017-07-17
Attending: INTERNAL MEDICINE
Payer: MEDICARE

## 2017-07-17 DIAGNOSIS — M81.0 OSTEOPOROSIS, UNSPECIFIED OSTEOPOROSIS TYPE, UNSPECIFIED PATHOLOGICAL FRACTURE PRESENCE: ICD-10-CM

## 2017-07-17 PROCEDURE — 77080 DXA BONE DENSITY AXIAL: CPT | Mod: 26,,, | Performed by: INTERNAL MEDICINE

## 2017-07-17 PROCEDURE — 77080 DXA BONE DENSITY AXIAL: CPT | Mod: TC

## 2017-07-26 ENCOUNTER — TELEPHONE (OUTPATIENT)
Dept: INFECTIOUS DISEASES | Facility: CLINIC | Age: 67
End: 2017-07-26

## 2017-07-26 DIAGNOSIS — R06.09 EXERTIONAL DYSPNEA: Primary | ICD-10-CM

## 2017-07-27 DIAGNOSIS — N95.2 POSTMENOPAUSAL ATROPHIC VAGINITIS: ICD-10-CM

## 2017-07-27 RX ORDER — ESTRADIOL 10 UG/1
INSERT VAGINAL
Qty: 14 TABLET | Refills: 0 | Status: SHIPPED | OUTPATIENT
Start: 2017-07-27 | End: 2017-12-11 | Stop reason: SDUPTHER

## 2017-07-31 ENCOUNTER — OFFICE VISIT (OUTPATIENT)
Dept: INFECTIOUS DISEASES | Facility: CLINIC | Age: 67
End: 2017-07-31
Payer: MEDICARE

## 2017-07-31 ENCOUNTER — PATIENT MESSAGE (OUTPATIENT)
Dept: HEMATOLOGY/ONCOLOGY | Facility: CLINIC | Age: 67
End: 2017-07-31

## 2017-07-31 VITALS
HEIGHT: 63 IN | HEART RATE: 95 BPM | BODY MASS INDEX: 20 KG/M2 | TEMPERATURE: 98 F | SYSTOLIC BLOOD PRESSURE: 99 MMHG | WEIGHT: 112.88 LBS | DIASTOLIC BLOOD PRESSURE: 59 MMHG

## 2017-07-31 DIAGNOSIS — Z71.84 COUNSELING FOR TRAVEL: Primary | ICD-10-CM

## 2017-07-31 DIAGNOSIS — Z71.84 COUNSELING FOR TRAVEL: ICD-10-CM

## 2017-07-31 PROCEDURE — 90670 PCV13 VACCINE IM: CPT | Mod: PBBFAC

## 2017-07-31 PROCEDURE — 99999 PR PBB SHADOW E&M-EST. PATIENT-LVL III: CPT | Mod: PBBFAC,,, | Performed by: INTERNAL MEDICINE

## 2017-07-31 PROCEDURE — 99213 OFFICE O/P EST LOW 20 MIN: CPT | Mod: S$PBB,,, | Performed by: INTERNAL MEDICINE

## 2017-07-31 PROCEDURE — 99213 OFFICE O/P EST LOW 20 MIN: CPT | Mod: PBBFAC | Performed by: INTERNAL MEDICINE

## 2017-07-31 PROCEDURE — 90717 YELLOW FEVER VACCINE SUBQ: CPT | Mod: PBBFAC

## 2017-07-31 RX ORDER — AZITHROMYCIN 500 MG/1
TABLET, FILM COATED ORAL
Qty: 4 TABLET | Refills: 0 | Status: SHIPPED | OUTPATIENT
Start: 2017-07-31 | End: 2017-12-11

## 2017-07-31 RX ORDER — ACETAZOLAMIDE 500 MG/1
CAPSULE, EXTENDED RELEASE ORAL
Qty: 20 CAPSULE | Refills: 1 | Status: SHIPPED | OUTPATIENT
Start: 2017-07-31 | End: 2017-07-31 | Stop reason: SDUPTHER

## 2017-07-31 RX ORDER — ATOVAQUONE AND PROGUANIL HYDROCHLORIDE 250; 100 MG/1; MG/1
TABLET, FILM COATED ORAL
Qty: 22 TABLET | Refills: 0 | Status: SHIPPED | OUTPATIENT
Start: 2017-07-31 | End: 2018-05-03

## 2017-07-31 RX ORDER — ACETAZOLAMIDE 500 MG/1
CAPSULE, EXTENDED RELEASE ORAL
Qty: 30 CAPSULE | Refills: 1 | Status: SHIPPED | OUTPATIENT
Start: 2017-07-31 | End: 2018-05-03

## 2017-07-31 NOTE — PROGRESS NOTES
Subjective:      Patient ID: Saritha Guzman is a 67 y.o. female.    Chief Complaint:Travel Consult      History of Present Illness    This patient is being seen for travel advice for an upcoming trip to [place] including the [facility] [place] departing on 8/27/17  for 15 days.  Reason for travel is vacation.  Medical history and immunization history were reviewed.  Based on travel history, medical history and immunization history I recommend the following:         -yellow fever vaccine (consented for expanded access protocol)   -oral typhoid   -diamox for altitude  -malarone for malaria   -zithromax prn traveler's diarrhea   - prevnar        The Patient was provided with an extensive travel guidance packet which provides travel information specific to the patients itinerary as well as food and water safety.     The patient was counseled on:  -Dietary precautions.  -Personal protective measures to prevent insect-borne diseases (e.g., malaria, dengue).  -Precautions to prevent exposure to rabies and seek treatment for possible exposures.  -Precautions against sun exposure.  -Personal and travel safety.    The patient was encouraged to contact us about any problems that may develop after immunization and possible side effects were reviewed.    The patient was instructed to purchase Imodium over the counter to take in case diarrhea (without blood or fever) develops. An antibiotic was ordered for treatment if severe or bloody diarrhea develops and the patient was instructed on use and possible side effects.     The patient was also instructed to purchase insect repellent containing DEET  and apply according to repellent label instructions.  If indicated by the patients itinerary an anti-malarial agent was prescribed for malaria prophylaxis and possible side effects were reviewed.     The patient was instructed to contact us if problems develop after travel.    30 minutes spent with patient in counseling.      Review of  Systems   Constitution: Negative for chills, decreased appetite, fever, weakness, malaise/fatigue, night sweats, weight gain and weight loss.   HENT: Negative for congestion, ear pain, headaches, hearing loss, hoarse voice, sore throat and tinnitus.    Eyes: Negative for blurred vision, redness and visual disturbance.   Cardiovascular: Negative for chest pain, leg swelling and palpitations.   Respiratory: Negative for cough, hemoptysis, shortness of breath and sputum production.    Hematologic/Lymphatic: Negative for adenopathy. Does not bruise/bleed easily.   Skin: Negative for dry skin, itching, rash and suspicious lesions.   Musculoskeletal: Negative for back pain, joint pain, myalgias and neck pain.   Gastrointestinal: Negative for abdominal pain, constipation, diarrhea, heartburn, nausea and vomiting.   Genitourinary: Negative for dysuria, flank pain, frequency, hematuria, hesitancy and urgency.   Neurological: Negative for dizziness, numbness and paresthesias.   Psychiatric/Behavioral: Negative for depression and memory loss. The patient does not have insomnia and is not nervous/anxious.      Objective:   Physical Exam  Assessment:       1. Counseling for travel          Plan:        see HPI

## 2017-08-01 ENCOUNTER — HOSPITAL ENCOUNTER (OUTPATIENT)
Dept: CARDIOLOGY | Facility: CLINIC | Age: 67
Discharge: HOME | End: 2017-08-01
Payer: MEDICARE

## 2017-08-01 DIAGNOSIS — R06.09 EXERTIONAL DYSPNEA: ICD-10-CM

## 2017-08-01 LAB
DIASTOLIC DYSFUNCTION: NO
MITRAL VALVE MOBILITY: NORMAL
RETIRED EF AND QEF - SEE NOTES: 63 (ref 55–65)

## 2017-08-01 PROCEDURE — 93321 DOPPLER ECHO F-UP/LMTD STD: CPT | Mod: 26,S$PBB,, | Performed by: INTERNAL MEDICINE

## 2017-08-01 PROCEDURE — 93351 STRESS TTE COMPLETE: CPT | Mod: PBBFAC | Performed by: INTERNAL MEDICINE

## 2017-08-02 ENCOUNTER — PATIENT MESSAGE (OUTPATIENT)
Dept: INFECTIOUS DISEASES | Facility: CLINIC | Age: 67
End: 2017-08-02

## 2017-08-04 DIAGNOSIS — C50.919 BREAST CANCER, STAGE 1, UNSPECIFIED LATERALITY: ICD-10-CM

## 2017-08-04 RX ORDER — LETROZOLE 2.5 MG/1
TABLET, FILM COATED ORAL
Qty: 90 TABLET | Refills: 5 | Status: SHIPPED | OUTPATIENT
Start: 2017-08-04 | End: 2018-08-06 | Stop reason: SDUPTHER

## 2017-11-02 DIAGNOSIS — C50.412 MALIGNANT NEOPLASM OF UPPER-OUTER QUADRANT OF LEFT BREAST IN FEMALE, ESTROGEN RECEPTOR POSITIVE: Primary | ICD-10-CM

## 2017-11-02 DIAGNOSIS — D05.02 LOBULAR CARCINOMA IN SITU (LCIS) OF LEFT BREAST: ICD-10-CM

## 2017-11-02 DIAGNOSIS — Z17.0 MALIGNANT NEOPLASM OF UPPER-OUTER QUADRANT OF LEFT BREAST IN FEMALE, ESTROGEN RECEPTOR POSITIVE: Primary | ICD-10-CM

## 2017-12-07 ENCOUNTER — HOSPITAL ENCOUNTER (OUTPATIENT)
Dept: RADIOLOGY | Facility: HOSPITAL | Age: 67
Discharge: HOME | End: 2017-12-07
Attending: PHYSICIAN ASSISTANT
Payer: MEDICARE

## 2017-12-07 DIAGNOSIS — Z17.0 MALIGNANT NEOPLASM OF UPPER-OUTER QUADRANT OF LEFT BREAST IN FEMALE, ESTROGEN RECEPTOR POSITIVE: ICD-10-CM

## 2017-12-07 DIAGNOSIS — C50.412 MALIGNANT NEOPLASM OF UPPER-OUTER QUADRANT OF LEFT BREAST IN FEMALE, ESTROGEN RECEPTOR POSITIVE: ICD-10-CM

## 2017-12-07 DIAGNOSIS — D05.02 LOBULAR CARCINOMA IN SITU (LCIS) OF LEFT BREAST: ICD-10-CM

## 2017-12-07 LAB
CREAT SERPL-MCNC: 0.9 MG/DL (ref 0.5–1.4)
SAMPLE: NORMAL

## 2017-12-07 PROCEDURE — 0159T MRI BREAST BILATERAL: CPT | Mod: 26,,, | Performed by: STUDENT IN AN ORGANIZED HEALTH CARE EDUCATION/TRAINING PROGRAM

## 2017-12-07 PROCEDURE — 0159T MRI BREAST BILATERAL: CPT | Mod: TC

## 2017-12-07 PROCEDURE — 25500020 PHARM REV CODE 255: Performed by: PHYSICIAN ASSISTANT

## 2017-12-07 PROCEDURE — 77059 MRI BREAST BILATERAL: CPT | Mod: 26,,, | Performed by: STUDENT IN AN ORGANIZED HEALTH CARE EDUCATION/TRAINING PROGRAM

## 2017-12-07 PROCEDURE — A9577 INJ MULTIHANCE: HCPCS | Performed by: PHYSICIAN ASSISTANT

## 2017-12-07 RX ADMIN — GADOBENATE DIMEGLUMINE 11 ML: 529 INJECTION, SOLUTION INTRAVENOUS at 09:12

## 2017-12-11 ENCOUNTER — OFFICE VISIT (OUTPATIENT)
Dept: HEMATOLOGY/ONCOLOGY | Facility: CLINIC | Age: 67
End: 2017-12-11
Payer: MEDICARE

## 2017-12-11 VITALS
DIASTOLIC BLOOD PRESSURE: 78 MMHG | RESPIRATION RATE: 17 BRPM | BODY MASS INDEX: 19.84 KG/M2 | WEIGHT: 112 LBS | SYSTOLIC BLOOD PRESSURE: 120 MMHG | TEMPERATURE: 98 F | HEART RATE: 88 BPM

## 2017-12-11 DIAGNOSIS — C50.412 MALIGNANT NEOPLASM OF UPPER-OUTER QUADRANT OF LEFT BREAST IN FEMALE, ESTROGEN RECEPTOR POSITIVE: Primary | ICD-10-CM

## 2017-12-11 DIAGNOSIS — Z17.0 MALIGNANT NEOPLASM OF UPPER-OUTER QUADRANT OF LEFT BREAST IN FEMALE, ESTROGEN RECEPTOR POSITIVE: Primary | ICD-10-CM

## 2017-12-11 PROCEDURE — 99213 OFFICE O/P EST LOW 20 MIN: CPT | Mod: PBBFAC | Performed by: INTERNAL MEDICINE

## 2017-12-11 PROCEDURE — 99999 PR PBB SHADOW E&M-EST. PATIENT-LVL III: CPT | Mod: PBBFAC,,, | Performed by: INTERNAL MEDICINE

## 2017-12-11 PROCEDURE — 99213 OFFICE O/P EST LOW 20 MIN: CPT | Mod: S$PBB,,, | Performed by: INTERNAL MEDICINE

## 2017-12-11 NOTE — PROGRESS NOTES
Subjective:       Patient ID: Saritha Guzman is a 67 y.o. female.    Chief Complaint: No chief complaint on file.    HPI Ms Guzman is a 68 y/o WF Is seen for F/U of left breast cancer. She had stage I ER positive disease with a high Oncotype score. She is currently on letrozole.   MRI last week was negative.    Mammogram from June was negative.    Physically she's been feeling well with no new issues.  She's having no shortness of breath and no pain.  She and her  been traveling quite a bit, they've been to [place], [place], and [place].  They have a trip to the [facility] for early part of next year.        History: mammogram in June 2013 showed pleomorphic calcifications left breast at the 2:00 position. By ultrasound there was a 2.4 cm asymmetric density.     A core needle biopsy showed LCIS and an MRI of the breast showed an irregular mass at the 2:00 position measured 3.9 x 2.1 cm with no adenopathy. 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 On July 12 Lumpectomy showed multiple foci of infiltrating lobular carcinoma, With the largest measuring 1.1 cm. histologic grade 3 nuclear grade 2 mitotic index 2.   The tumor was 50% ER positive-which would've moderate, [place] negative and HER-2 negative.     Subsequently she underwent a left mastectomy with sentinel lymph node biopsy which showed only some residual LCIS. The sentinel lymph node was negative. Final pathological stage TI CN0 stage IA. Oncotype score was high.     She had 4 cycles of adjuvant Taxotere and Cytoxan completed January 2014 and then began adjuvant letrozole therapy.  Review of Systems   Constitutional: Negative for appetite change and unexpected weight change.   Eyes: Negative for visual disturbance.   Respiratory: Negative for cough and shortness of breath.    Cardiovascular: Negative for chest pain.   Gastrointestinal: Negative for abdominal pain and diarrhea.    Genitourinary: Negative for frequency.   Musculoskeletal: Negative for back pain.   Skin: Negative for rash.   Neurological: Negative for headaches.   Hematological: Negative for adenopathy.   Psychiatric/Behavioral: The patient is not nervous/anxious.        Objective:      Physical Exam   Constitutional: She is oriented to person, place, and time. She appears well-developed and well-nourished. No distress.   HENT:   Mouth/Throat: Oropharynx is clear and moist. No oropharyngeal exudate.   Eyes: No scleral icterus.   Cardiovascular: Normal rate and regular rhythm.    Pulmonary/Chest: Effort normal and breath sounds normal. She has no wheezes. She has no rales.   Abdominal: Soft. She exhibits no mass. There is no tenderness.   Lymphadenopathy:     She has no cervical adenopathy.   Neurological: She is alert and oriented to person, place, and time.   Skin: No rash noted.   Psychiatric: She has a normal mood and affect. Her behavior is normal. Thought content normal.       Assessment:     breast MRI- negative    #1 stage I ER positive carcinoma of the left breast  No diagnosis found.    Plan:       Continue letrozole and return in  6 months with mammogram.    Distress Screening Results: Psychosocial Distress screening score of Distress Score: 4 noted and reviewed. No intervention indicated.

## 2018-01-24 ENCOUNTER — TELEPHONE (OUTPATIENT)
Dept: INFECTIOUS DISEASES | Facility: CLINIC | Age: 68
End: 2018-01-24

## 2018-01-24 RX ORDER — AZITHROMYCIN 250 MG/1
TABLET, FILM COATED ORAL
Qty: 6 TABLET | Refills: 0 | Status: SHIPPED | OUTPATIENT
Start: 2018-01-24 | End: 2018-05-03

## 2018-02-11 RX ORDER — ATORVASTATIN CALCIUM 20 MG/1
TABLET, FILM COATED ORAL
Qty: 30 TABLET | Refills: 11 | Status: SHIPPED | OUTPATIENT
Start: 2018-02-11 | End: 2018-12-30 | Stop reason: SDUPTHER

## 2018-04-17 ENCOUNTER — PATIENT MESSAGE (OUTPATIENT)
Dept: HEMATOLOGY/ONCOLOGY | Facility: CLINIC | Age: 68
End: 2018-04-17

## 2018-04-17 DIAGNOSIS — N95.2 POSTMENOPAUSAL ATROPHIC VAGINITIS: ICD-10-CM

## 2018-04-17 RX ORDER — ESTRADIOL 10 UG/1
INSERT VAGINAL
Qty: 14 TABLET | Refills: 0 | Status: CANCELLED | OUTPATIENT
Start: 2018-04-17

## 2018-04-20 ENCOUNTER — PATIENT MESSAGE (OUTPATIENT)
Dept: HEMATOLOGY/ONCOLOGY | Facility: CLINIC | Age: 68
End: 2018-04-20

## 2018-04-21 ENCOUNTER — PATIENT MESSAGE (OUTPATIENT)
Dept: HEMATOLOGY/ONCOLOGY | Facility: CLINIC | Age: 68
End: 2018-04-21

## 2018-05-03 ENCOUNTER — OFFICE VISIT (OUTPATIENT)
Dept: OBSTETRICS AND GYNECOLOGY | Facility: CLINIC | Age: 68
End: 2018-05-03
Attending: OBSTETRICS & GYNECOLOGY
Payer: MEDICARE

## 2018-05-03 VITALS
BODY MASS INDEX: 19.61 KG/M2 | DIASTOLIC BLOOD PRESSURE: 64 MMHG | HEIGHT: 63 IN | WEIGHT: 110.69 LBS | SYSTOLIC BLOOD PRESSURE: 112 MMHG

## 2018-05-03 DIAGNOSIS — R53.83 FATIGUE, UNSPECIFIED TYPE: Primary | ICD-10-CM

## 2018-05-03 DIAGNOSIS — M81.0 OSTEOPOROSIS, UNSPECIFIED OSTEOPOROSIS TYPE, UNSPECIFIED PATHOLOGICAL FRACTURE PRESENCE: ICD-10-CM

## 2018-05-03 DIAGNOSIS — Z85.3 HISTORY OF BREAST CANCER: ICD-10-CM

## 2018-05-03 DIAGNOSIS — N95.1 MENOPAUSAL SYMPTOM: ICD-10-CM

## 2018-05-03 PROCEDURE — 99204 OFFICE O/P NEW MOD 45 MIN: CPT | Mod: S$GLB,,, | Performed by: OBSTETRICS & GYNECOLOGY

## 2018-05-10 NOTE — PROGRESS NOTES
"SUBJECTIVE:   67 y.o. female   Presents for survivorship and to discuss some problems. . Patient's last menstrual period was 1978 (within months)..   She had stage I ER positive disease with a high Oncotype score. She is currently on letrozole.  History: mammogram in 2013 showed pleomorphic calcifications left breast at the 2:00 position. By ultrasound there was a 2.4 cm asymmetric density.      A core needle biopsy showed LCIS and an MRI of the breast showed an irregular mass at the 2:00 position measured 3.9 x 2.1 cm with no adenopathy. 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 On  Lumpectomy showed multiple foci of infiltrating lobular carcinoma, With the largest measuring 1.1 cm. histologic grade 3 nuclear grade 2 mitotic index 2.   The tumor was 50% ER positive-which would've moderate, [place] negative and HER-2 negative.      Subsequently she underwent a left mastectomy with sentinel lymph node biopsy which showed only some residual LCIS. The sentinel lymph node was negative. Final pathological stage TI CN0 stage IA. Oncotype score was high.      She had 4 cycles of adjuvant Taxotere and Cytoxan completed 2014 and then began adjuvant letrozole therapy.   She reports starting a few months ago she began having bad nightmares. She reports she was waking up 3-4 times per night and had night sweats and joint pain. She also reports having muscle aches which she noticed in Yoga class and her hands started "bothering me." She also reports feeling "depressed, having crying spells and lack of sleep. She reports experiencing vaginal dryness and sh is worried that the Vagifem interferes with the efficacy of her AI. She reports that she stopped Letrozole 2 weeks ago- "slept better, mood was better, hands were better." She also stopped Vagifem for 10 days. She reports that she exercises and is active.  She reports that she takes a " Simply sleep, sometimes a Benadryl or 1/2 a Lorazepam .        Past Medical History:   Diagnosis Date    Atypical ductal hyperplasia, breast     Breast CA 7/2012    Fibrocystic breast     Goiter     History of endometriosis     Hyperlipidemia     Lobular carcinoma in situ     PONV (postoperative nausea and vomiting)      Past Surgical History:   Procedure Laterality Date    APPENDECTOMY      BREAST BIOPSY      BREAST CYST ASPIRATION      BREAST LUMPECTOMY      FACELIFT      HYSTERECTOMY      MASTECTOMY      L breast    SINUS SURGERY      tonsillectomy       Social History     Social History    Marital status:      Spouse name: N/A    Number of children: N/A    Years of education: N/A     Occupational History    Not on file.     Social History Main Topics    Smoking status: Never Smoker    Smokeless tobacco: Never Used    Alcohol use 1.2 oz/week     2 Glasses of wine per week    Drug use: No    Sexual activity: Yes     Partners: Male     Birth control/ protection: None     Other Topics Concern    Not on file     Social History Narrative    No narrative on file     Family History   Problem Relation Age of Onset    Stroke Mother     Diabetes Father     Stroke Maternal Aunt     Cancer Maternal Grandmother         spine    Stroke Maternal Grandmother     Cancer Maternal Grandfather         jaw    No Known Problems Sister     No Known Problems Brother     No Known Problems Maternal Uncle     No Known Problems Paternal Aunt     No Known Problems Paternal Uncle     No Known Problems Paternal Grandmother     No Known Problems Paternal Grandfather     Thyroid cancer Neg Hx     Breast cancer Neg Hx     Ovarian cancer Neg Hx     Amblyopia Neg Hx     Blindness Neg Hx     Cataracts Neg Hx     Glaucoma Neg Hx     Hypertension Neg Hx     Macular degeneration Neg Hx     Retinal detachment Neg Hx     Strabismus Neg Hx     Thyroid disease Neg Hx     Colon cancer Neg Hx       OB History    Para Term  AB Living   0             SAB TAB Ectopic Multiple Live Births                             Current Outpatient Prescriptions   Medication Sig Dispense Refill    ascorbic acid (VITAMIN C) 1000 MG tablet Take 1,000 mg by mouth once daily.      atorvastatin (LIPITOR) 20 MG tablet TAKE 1 TABLET(20 MG) BY MOUTH EVERY DAY 30 tablet 11    calcium-vitamin D3 (CALCIUM 500 + D) 500 mg(1,250mg) -200 unit per tablet Take 1 tablet by mouth 2 (two) times daily with meals.      coenzyme Q10 (CO Q-10) 200 mg capsule Take 200 mg by mouth once daily.      cyanocobalamin (VITAMIN B-12) 100 MCG tablet Take 100 mcg by mouth once daily.      DOCOSAHEXANOIC ACID/EPA (FISH OIL ORAL) Take by mouth.      letrozole (FEMARA) 2.5 mg Tab TAKE 1 TABLET BY MOUTH DAILY 90 tablet 5    lorazepam (ATIVAN) 1 MG tablet Take 1 tablet (1 mg total) by mouth every 12 (twelve) hours as needed. 30 tablet 2    MULTIVITAMIN ORAL Take by mouth.      VAGIFEM 10 mcg Tab INSERT 1 TABLET VAGINALLY ONCE DAILY FOR 1 WEEK, THEN TWICE A WEEK AS DIRECTED 14 tablet 0     Current Facility-Administered Medications   Medication Dose Route Frequency Provider Last Rate Last Dose    acetaminophen tablet 500 mg  500 mg Oral PRN Adrian Santana MD   500 mg at 09/25/15 1324     Allergies: Codeine; Corticosteroids (glucocorticoids); Penicillins; and Tetracyclines     The 10-year ASCVD risk score (Tehamanelida LYNNE Jr., et al., 2013) is: 5.6%    Values used to calculate the score:      Age: 67 years      Sex: Female      Is Non- : No      Diabetic: No      Tobacco smoker: No      Systolic Blood Pressure: 112 mmHg      Is BP treated: No      HDL Cholesterol: 51 mg/dL      Total Cholesterol: 158 mg/dL      ROS:  Constitutional: no weight loss, weight gain, fever, +fatigue  Eyes:  No vision changes, glasses/contacts  ENT/Mouth: No ulcers, sinus problems, ears ringing, headache  Cardiovascular: No inability to lie  flat, chest pain, exercise intolerance, swelling, heart palpitations  Respiratory: No wheezing, coughing blood, shortness of breath, or cough  Gastrointestinal: No diarrhea, bloody stool, nausea/vomiting, constipation, gas, hemorrhoids  Genitourinary: No blood in urine, painful urination, urgency of urination, frequency of urination, incomplete emptying, incontinence, abnormal bleeding, painful periods, heavy periods, vaginal discharge, vaginal odor, painful intercourse, sexual problems, bleeding after intercourse, +vaginal dryness.  Musculoskeletal: No muscle weakness, +joint aches  Skin/Breast: No painful breasts, nipple discharge, masses, rash, ulcers  Neurological: No passing out, seizures, numbness, headache  Endocrine: No diabetes, hypothyroid, hyperthyroid, hot flashes, hair loss, abnormal hair growth, acne  Psychiatric: No depression, crying  Hematologic: No bruises, bleeding, swollen lymph nodes, anemia.      Physical Exam  General- well developed, well nourished  Vulva- no masses, no lesions  Vagina-  no masses, no lesions, +atrophy  Cervix- absent surgically  Uterus-absent surgically  Adnexa- nontender, no masses      ASSESSMENT:   Vaginal atrophy  Medication side effects  Menopausal symptoms    PLAN:   Counseled her to resume her AI- stressed to her the importance of this medication- discussed use of testosterone pellets to help with side effects  Recommend Acupuncture for joint pains  Counseled her to stop Vagifem  2 week trial ofI Intrarosa- let me know if she notices improvement and will get DHEA compounded at [facility]-   Will follow up Estradiol level in 2 months and Vitamin D

## 2018-05-15 ENCOUNTER — PATIENT MESSAGE (OUTPATIENT)
Dept: INFECTIOUS DISEASES | Facility: CLINIC | Age: 68
End: 2018-05-15

## 2018-05-15 ENCOUNTER — PATIENT MESSAGE (OUTPATIENT)
Dept: OBSTETRICS AND GYNECOLOGY | Facility: CLINIC | Age: 68
End: 2018-05-15

## 2018-07-02 ENCOUNTER — OFFICE VISIT (OUTPATIENT)
Dept: HEMATOLOGY/ONCOLOGY | Facility: CLINIC | Age: 68
End: 2018-07-02
Payer: MEDICARE

## 2018-07-02 ENCOUNTER — HOSPITAL ENCOUNTER (OUTPATIENT)
Dept: RADIOLOGY | Facility: HOSPITAL | Age: 68
Discharge: HOME | End: 2018-07-02
Attending: INTERNAL MEDICINE
Payer: MEDICARE

## 2018-07-02 ENCOUNTER — PATIENT MESSAGE (OUTPATIENT)
Dept: HEMATOLOGY/ONCOLOGY | Facility: CLINIC | Age: 68
End: 2018-07-02

## 2018-07-02 VITALS
DIASTOLIC BLOOD PRESSURE: 52 MMHG | RESPIRATION RATE: 16 BRPM | SYSTOLIC BLOOD PRESSURE: 95 MMHG | BODY MASS INDEX: 19.84 KG/M2 | TEMPERATURE: 98 F | HEIGHT: 63 IN | WEIGHT: 112 LBS | OXYGEN SATURATION: 97 %

## 2018-07-02 DIAGNOSIS — Z17.0 MALIGNANT NEOPLASM OF UPPER-OUTER QUADRANT OF LEFT BREAST IN FEMALE, ESTROGEN RECEPTOR POSITIVE: ICD-10-CM

## 2018-07-02 DIAGNOSIS — C50.412 MALIGNANT NEOPLASM OF UPPER-OUTER QUADRANT OF LEFT BREAST IN FEMALE, ESTROGEN RECEPTOR POSITIVE: ICD-10-CM

## 2018-07-02 DIAGNOSIS — M81.8 OSTEOPOROSIS, IDIOPATHIC: Primary | ICD-10-CM

## 2018-07-02 DIAGNOSIS — E78.00 HYPERCHOLESTEREMIA: ICD-10-CM

## 2018-07-02 PROCEDURE — 77065 DX MAMMO INCL CAD UNI: CPT | Mod: TC,PO

## 2018-07-02 PROCEDURE — 99214 OFFICE O/P EST MOD 30 MIN: CPT | Mod: PBBFAC | Performed by: PHYSICIAN ASSISTANT

## 2018-07-02 PROCEDURE — 77061 BREAST TOMOSYNTHESIS UNI: CPT | Mod: TC,PO

## 2018-07-02 PROCEDURE — 99999 PR PBB SHADOW E&M-EST. PATIENT-LVL IV: CPT | Mod: PBBFAC,,, | Performed by: PHYSICIAN ASSISTANT

## 2018-07-02 PROCEDURE — 77061 BREAST TOMOSYNTHESIS UNI: CPT | Mod: 26,,, | Performed by: RADIOLOGY

## 2018-07-02 PROCEDURE — 99214 OFFICE O/P EST MOD 30 MIN: CPT | Mod: S$PBB,,, | Performed by: PHYSICIAN ASSISTANT

## 2018-07-02 PROCEDURE — 77065 DX MAMMO INCL CAD UNI: CPT | Mod: 26,,, | Performed by: RADIOLOGY

## 2018-07-02 NOTE — Clinical Note
Do you have any shingles vaccines in stock? Shortage at drugstores, would like to get at your office if possible.

## 2018-07-02 NOTE — PROGRESS NOTES
Subjective:       Patient ID: Saritha Guzman is a 68 y.o. female.    Chief Complaint: Malignant neoplasm of upper-outer quadrant of left breast in    Ms Guzman is a 67 y/o WF Is seen for F/U of left breast cancer. She had stage I ER positive disease with a high Oncotype score. She is currently on letrozole.    She is feeling fair, notes some arthralgias associated with letrozole and hot flashes.  Recently took a breatk from AI therapy with improvement in symptoms. No fever, chills, nausea, vomiting, shortness of breath or breast pain/complaints.  Remains quite active. Exercising regularly.  No breast pain.  Patient followed in GYN survivorship by Dr. Cordero.     Mammogram from today:  Impression:  No mammographic evidence of malignancy.  BI-RADS Category 1: Negative  Recommendation:  Routine screening mammogram in 1 year is recommended.       History: mammogram showed pleomorphic calcifications left breast at the 2:00 position. By ultrasound there was a 2.4 so that the asymmetric density. She was having no breast symptoms at that time. A core needle biopsy showed LCIS and an MRI of the breast showed an irregular mass at the 2:00 position measured 3.9 x 2.1 cm with no adenopathy. 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 On July 12 Lumpectomy showed multiple foci of infiltrating lobular carcinoma, With the largest measuring 1.1 cm. histologic grade 3 nuclear grade 2 mitotic index 2. The tumor was 50% ER positive-which would've moderate, [place] negative and HER-2 negative. Subsequently she underwent a left mastectomy with sentinel lymph node biopsy which showed only some residual LCIS. The sentinel lymph node was negative. Final pathological stage TI CN0 stage IA. Oncotype score was high. She had 4 cycles of adjuvant Taxotere and Cytoxan completed January 2014 and then began adjuvant letrozole therapy.      Review of Systems   Constitutional: Negative.          + hot flashes     HENT: Negative for mouth sores, sore throat and trouble swallowing.    Eyes: Negative for visual disturbance.   Respiratory: Negative for cough, chest tightness and shortness of breath.    Cardiovascular: Negative for chest pain and leg swelling.   Gastrointestinal: Negative for abdominal distention, abdominal pain, blood in stool, constipation, diarrhea, nausea and vomiting.   Genitourinary: Negative for dysuria and hematuria.   Musculoskeletal: Positive for arthralgias. Negative for back pain and myalgias.   Skin: Negative for pallor and rash.   Neurological: Negative for dizziness, weakness and light-headedness.   Hematological: Negative for adenopathy. Does not bruise/bleed easily.   Psychiatric/Behavioral: Negative for dysphoric mood and suicidal ideas. The patient is not nervous/anxious.        Objective:      Physical Exam   Constitutional: She is oriented to person, place, and time. She appears well-developed and well-nourished. No distress.   ECOG 0  Presents alone     HENT:   Head: Normocephalic.   Mouth/Throat: Oropharynx is clear and moist. No oropharyngeal exudate.   Eyes: Conjunctivae are normal. No scleral icterus.   Neck: Normal range of motion. Neck supple. No thyromegaly present.   Pulmonary/Chest: Effort normal and breath sounds normal. No respiratory distress.   Right breast without mass or nodule. Left breast reconstruction shows well healed lateral incision, just superior to the midportion of the incision is a small superficial nodularity, likely the edge of the implant.. Implant with some mild contracture at lateral incision line No axillary or supraclavicular adenopathy bilaterally.    Abdominal: Soft. Bowel sounds are normal. She exhibits no distension and no mass. There is no tenderness.   No hepatosplenomegaly   Musculoskeletal: Normal range of motion. She exhibits no edema.   No spinal or paraspinal tenderness to palpation     Lymphadenopathy:     She has no  cervical adenopathy.   Neurological: She is alert and oriented to person, place, and time. No cranial nerve deficit.   Skin: Skin is warm and dry. No rash noted. No erythema.   Psychiatric: She has a normal mood and affect. Her behavior is normal. Judgment and thought content normal.   Vitals reviewed.      Assessment:       68 year old female with stage IA (ER positive) breast cancer, currently on Femara and tolerating that treatment well.  Patient with osteoporosis treated with Reclast.         PLAN:  Continue Femara and return to clinic in 4 months.  We discussed side effects of AI therapy and continuation of treatment until 5 years time. She knows to call if symptoms worsen  US of superficial nodularity at left breast implant was negative.  Patient gets alternating MRI/mammogram every 6 months, due for MRI in December.  Patient scheduled for Reclast.

## 2018-07-02 NOTE — Clinical Note
Please schedule patient to get reclast sometime late next week (might need to get auth as last given last year) She will also need a lipid panel drawn that time.  Martin in 6 months with MRI

## 2018-07-03 ENCOUNTER — HOSPITAL ENCOUNTER (OUTPATIENT)
Dept: RADIOLOGY | Facility: HOSPITAL | Age: 68
Discharge: HOME | End: 2018-07-03
Attending: PHYSICIAN ASSISTANT
Payer: MEDICARE

## 2018-07-03 DIAGNOSIS — C50.412 MALIGNANT NEOPLASM OF UPPER-OUTER QUADRANT OF LEFT BREAST IN FEMALE, ESTROGEN RECEPTOR POSITIVE: ICD-10-CM

## 2018-07-03 DIAGNOSIS — Z17.0 MALIGNANT NEOPLASM OF UPPER-OUTER QUADRANT OF LEFT BREAST IN FEMALE, ESTROGEN RECEPTOR POSITIVE: ICD-10-CM

## 2018-07-03 PROCEDURE — 76642 ULTRASOUND BREAST LIMITED: CPT | Mod: 26,LT,, | Performed by: RADIOLOGY

## 2018-07-03 PROCEDURE — 76642 ULTRASOUND BREAST LIMITED: CPT | Mod: TC,PO,LT

## 2018-07-06 ENCOUNTER — PATIENT MESSAGE (OUTPATIENT)
Dept: HEMATOLOGY/ONCOLOGY | Facility: CLINIC | Age: 68
End: 2018-07-06

## 2018-07-06 RX ORDER — HEPARIN SODIUM (PORCINE) LOCK FLUSH IV SOLN 100 UNIT/ML 100 UNIT/ML
100 SOLUTION INTRAVENOUS
Status: CANCELLED | OUTPATIENT
Start: 2018-07-06

## 2018-07-06 RX ORDER — ACETAMINOPHEN 500 MG
500 TABLET ORAL
Status: CANCELLED | OUTPATIENT
Start: 2018-07-06

## 2018-07-06 RX ORDER — ZOLEDRONIC ACID 5 MG/100ML
5 INJECTION, SOLUTION INTRAVENOUS ONCE
Status: CANCELLED | OUTPATIENT
Start: 2018-07-06 | End: 2018-07-06

## 2018-07-06 RX ORDER — SODIUM CHLORIDE 0.9 % (FLUSH) 0.9 %
10 SYRINGE (ML) INJECTION
Status: CANCELLED | OUTPATIENT
Start: 2018-07-06

## 2018-07-18 ENCOUNTER — LAB VISIT (OUTPATIENT)
Dept: LAB | Facility: OTHER | Age: 68
End: 2018-07-18
Attending: OBSTETRICS & GYNECOLOGY
Payer: MEDICARE

## 2018-07-18 DIAGNOSIS — M81.0 OSTEOPOROSIS, UNSPECIFIED OSTEOPOROSIS TYPE, UNSPECIFIED PATHOLOGICAL FRACTURE PRESENCE: ICD-10-CM

## 2018-07-18 DIAGNOSIS — N95.1 MENOPAUSAL SYMPTOM: ICD-10-CM

## 2018-07-18 DIAGNOSIS — E78.00 HYPERCHOLESTEREMIA: ICD-10-CM

## 2018-07-18 DIAGNOSIS — Z85.3 HISTORY OF BREAST CANCER: ICD-10-CM

## 2018-07-18 LAB
25(OH)D3+25(OH)D2 SERPL-MCNC: 51 NG/ML
CHOLEST SERPL-MCNC: 163 MG/DL
CHOLEST/HDLC SERPL: 2.9 {RATIO}
ESTRADIOL SERPL-MCNC: <10 PG/ML
HDLC SERPL-MCNC: 57 MG/DL
HDLC SERPL: 35 %
LDLC SERPL CALC-MCNC: 90.4 MG/DL
NONHDLC SERPL-MCNC: 106 MG/DL
TRIGL SERPL-MCNC: 78 MG/DL

## 2018-07-18 PROCEDURE — 36415 COLL VENOUS BLD VENIPUNCTURE: CPT

## 2018-07-18 PROCEDURE — 80061 LIPID PANEL: CPT

## 2018-07-18 PROCEDURE — 82670 ASSAY OF TOTAL ESTRADIOL: CPT

## 2018-07-18 PROCEDURE — 82306 VITAMIN D 25 HYDROXY: CPT

## 2018-07-19 ENCOUNTER — OFFICE VISIT (OUTPATIENT)
Dept: OBSTETRICS AND GYNECOLOGY | Facility: CLINIC | Age: 68
End: 2018-07-19
Attending: OBSTETRICS & GYNECOLOGY
Payer: MEDICARE

## 2018-07-19 VITALS
DIASTOLIC BLOOD PRESSURE: 72 MMHG | WEIGHT: 110.56 LBS | BODY MASS INDEX: 18.88 KG/M2 | HEIGHT: 64 IN | SYSTOLIC BLOOD PRESSURE: 112 MMHG

## 2018-07-19 DIAGNOSIS — Z85.3 HISTORY OF BREAST CANCER: Primary | ICD-10-CM

## 2018-07-19 DIAGNOSIS — N89.8 VAGINAL DRYNESS: ICD-10-CM

## 2018-07-19 PROCEDURE — 99213 OFFICE O/P EST LOW 20 MIN: CPT | Mod: S$GLB,,, | Performed by: OBSTETRICS & GYNECOLOGY

## 2018-07-19 RX ORDER — ZOSTER VACCINE RECOMBINANT, ADJUVANTED 50 MCG/0.5
KIT INTRAMUSCULAR
COMMUNITY
Start: 2018-07-18 | End: 2019-08-14 | Stop reason: ALTCHOICE

## 2018-07-19 RX ORDER — VIT C/E/ZN/COPPR/LUTEIN/ZEAXAN 250MG-90MG
2000 CAPSULE ORAL DAILY
COMMUNITY
Start: 2018-05-03

## 2018-07-20 NOTE — PROGRESS NOTES
"SUBJECTIVE:   68 y.o. female  presents for follow up for survivorship. . Patient's last menstrual period was 1978 (within months)..  She reports doing "really well" She states the Intrarosa (using 3 times per week) has "worked wonders" She also reports that she is using the moisturizer and Lubrigyn which she finds helpful..    She reports that she is starting a new job- very excited about it.   She was 6 months late getting her Reclast     Past Medical History:   Diagnosis Date    Atypical ductal hyperplasia, breast 2008    Left    Breast CA 2013    left LCIS on bx.'s, [facility] on Lumpectomy    Fibrocystic breast     Goiter     History of endometriosis     Hyperlipidemia     Lobular carcinoma in situ     PONV (postoperative nausea and vomiting)      Past Surgical History:   Procedure Laterality Date    APPENDECTOMY      BREAST BIOPSY Left 2013    Core bx, LCIS    BREAST BIOPSY Left 2013    MRI Core bx, LCIS    BREAST BIOPSY Left     Core bx, ADH    BREAST BIOPSY Left     Excisional bx., ADH    BREAST CYST ASPIRATION      BREAST LUMPECTOMY Left 2013     [facility]    FACELIFT      HYSTERECTOMY      MASTECTOMY Left          SINUS SURGERY      tonsillectomy       Social History     Social History    Marital status:      Spouse name: N/A    Number of children: N/A    Years of education: N/A     Occupational History    Not on file.     Social History Main Topics    Smoking status: Never Smoker    Smokeless tobacco: Never Used    Alcohol use 1.2 oz/week     2 Glasses of wine per week    Drug use: No    Sexual activity: Yes     Partners: Male     Birth control/ protection: None     Other Topics Concern    Not on file     Social History Narrative    No narrative on file     Family History   Problem Relation Age of Onset    Stroke Mother     Diabetes Father     Stroke Maternal Aunt     Cancer Maternal Grandmother         spine    Stroke Maternal Grandmother  "    Cancer Maternal Grandfather         jaw    No Known Problems Sister     No Known Problems Brother     No Known Problems Maternal Uncle     No Known Problems Paternal Aunt     No Known Problems Paternal Uncle     No Known Problems Paternal Grandmother     No Known Problems Paternal Grandfather     Thyroid cancer Neg Hx     Breast cancer Neg Hx     Ovarian cancer Neg Hx     Amblyopia Neg Hx     Blindness Neg Hx     Cataracts Neg Hx     Glaucoma Neg Hx     Hypertension Neg Hx     Macular degeneration Neg Hx     Retinal detachment Neg Hx     Strabismus Neg Hx     Thyroid disease Neg Hx     Colon cancer Neg Hx      OB History    Para Term  AB Living   0   0         SAB TAB Ectopic Multiple Live Births                             Current Outpatient Prescriptions   Medication Sig Dispense Refill    ascorbic acid (VITAMIN C) 1000 MG tablet Take 1,000 mg by mouth once daily.      atorvastatin (LIPITOR) 20 MG tablet TAKE 1 TABLET(20 MG) BY MOUTH EVERY DAY 30 tablet 11    calcium-vitamin D3 (CALCIUM 500 + D) 500 mg(1,250mg) -200 unit per tablet Take 1 tablet by mouth 2 (two) times daily with meals.      cholecalciferol, vitamin D3, (VITAMIN D3) 1,000 unit capsule       coenzyme Q10 (CO Q-10) 200 mg capsule Take 200 mg by mouth once daily.      cyanocobalamin (VITAMIN B-12) 100 MCG tablet Take 100 mcg by mouth once daily.      DOCOSAHEXANOIC ACID/EPA (FISH OIL ORAL) Take by mouth.      letrozole (FEMARA) 2.5 mg Tab TAKE 1 TABLET BY MOUTH DAILY 90 tablet 5    MULTIVITAMIN ORAL Take by mouth.      prasterone, dhea, (INTRAROSA) 6.5 mg Inst Place 1 applicator vaginally 3 (three) times a week. 30 each 11    lorazepam (ATIVAN) 1 MG tablet Take 1 tablet (1 mg total) by mouth every 12 (twelve) hours as needed. 30 tablet 2    SHINGRIX, PF, 50 mcg/0.5 mL injection        No current facility-administered medications for this visit.      Allergies: Codeine; Corticosteroids  (glucocorticoids); Penicillins; and Tetracyclines     The 10-year ASCVD risk score (Alejandra LYNNE Jr., et al., 2013) is: 6.1%    Values used to calculate the score:      Age: 68 years      Sex: Female      Is Non- : No      Diabetic: No      Tobacco smoker: No      Systolic Blood Pressure: 112 mmHg      Is BP treated: No      HDL Cholesterol: 57 mg/dL      Total Cholesterol: 163 mg/dL      ROS:  Constitutional: no weight loss, weight gain, fever, fatigue    Gastrointestinal: No diarrhea, bloody stool, nausea/vomiting, constipation, gas, hemorrhoids  Genitourinary: No blood in urine, painful urination, urgency of urination, frequency of urination, incomplete emptying, incontinence, abnormal bleeding, painful periods, heavy periods, vaginal discharge, vaginal odor, painful intercourse, sexual problems, bleeding after intercourse.  Musculoskeletal: No muscle weakness  Skin/Breast: No painful breasts, nipple discharge, masses, rash, ulcers  Neurological: No passing out, seizures, numbness, headache  Endocrine: No diabetes, hypothyroid, hyperthyroid, hot flashes, hair loss, abnormal hair growth, acne  Psychiatric: No depression, crying  Hematologic: No bruises, bleeding, swollen lymph nodes, anemia.      Physical Exam  deferred    ASSESSMENT:   Vaginal dryness improved  History of breast cancer    PLAN:   Counseled patient on Intrarosa- continue to use 3 times per week  Counseled her on Vitamin D supplementation  Follow up for yearly exam

## 2018-07-26 ENCOUNTER — INFUSION (OUTPATIENT)
Dept: INFUSION THERAPY | Facility: HOSPITAL | Age: 68
End: 2018-07-26
Attending: OBSTETRICS & GYNECOLOGY
Payer: MEDICARE

## 2018-07-26 VITALS — DIASTOLIC BLOOD PRESSURE: 57 MMHG | HEART RATE: 88 BPM | SYSTOLIC BLOOD PRESSURE: 118 MMHG | RESPIRATION RATE: 18 BRPM

## 2018-07-26 DIAGNOSIS — M81.8 OSTEOPOROSIS, IDIOPATHIC: Primary | ICD-10-CM

## 2018-07-26 PROCEDURE — 63600175 PHARM REV CODE 636 W HCPCS: Performed by: PHYSICIAN ASSISTANT

## 2018-07-26 PROCEDURE — 96365 THER/PROPH/DIAG IV INF INIT: CPT

## 2018-07-26 RX ORDER — HEPARIN SODIUM (PORCINE) LOCK FLUSH IV SOLN 100 UNIT/ML 100 UNIT/ML
100 SOLUTION INTRAVENOUS
Status: CANCELLED | OUTPATIENT
Start: 2018-07-26

## 2018-07-26 RX ORDER — ACETAMINOPHEN 500 MG
500 TABLET ORAL
Status: DISCONTINUED | OUTPATIENT
Start: 2018-07-26 | End: 2018-07-26 | Stop reason: HOSPADM

## 2018-07-26 RX ORDER — ZOLEDRONIC ACID 5 MG/100ML
5 INJECTION, SOLUTION INTRAVENOUS ONCE
Status: CANCELLED | OUTPATIENT
Start: 2018-07-26 | End: 2018-07-26

## 2018-07-26 RX ORDER — SODIUM CHLORIDE 0.9 % (FLUSH) 0.9 %
10 SYRINGE (ML) INJECTION
Status: CANCELLED | OUTPATIENT
Start: 2018-07-26

## 2018-07-26 RX ORDER — ZOLEDRONIC ACID 5 MG/100ML
5 INJECTION, SOLUTION INTRAVENOUS ONCE
Status: COMPLETED | OUTPATIENT
Start: 2018-07-26 | End: 2018-07-26

## 2018-07-26 RX ORDER — ACETAMINOPHEN 500 MG
500 TABLET ORAL
Status: CANCELLED | OUTPATIENT
Start: 2018-07-26

## 2018-07-26 RX ADMIN — ZOLEDRONIC ACID 5 MG: 5 INJECTION, SOLUTION INTRAVENOUS at 03:07

## 2018-07-26 NOTE — PLAN OF CARE
Problem: Patient Care Overview (Adult)  Goal: Plan of Care Review  Outcome: Ongoing (interventions implemented as appropriate)  Pt tolerated Reclast well.  No s/s of reaction. Vitals stable, NAD.

## 2018-08-06 DIAGNOSIS — C50.919 BREAST CANCER, STAGE 1, UNSPECIFIED LATERALITY: ICD-10-CM

## 2018-08-06 RX ORDER — LETROZOLE 2.5 MG/1
TABLET, FILM COATED ORAL
Qty: 90 TABLET | Refills: 0 | Status: SHIPPED | OUTPATIENT
Start: 2018-08-06 | End: 2019-07-06 | Stop reason: SDUPTHER

## 2018-11-07 ENCOUNTER — PATIENT MESSAGE (OUTPATIENT)
Dept: HEMATOLOGY/ONCOLOGY | Facility: CLINIC | Age: 68
End: 2018-11-07

## 2018-11-15 ENCOUNTER — PATIENT MESSAGE (OUTPATIENT)
Dept: HEMATOLOGY/ONCOLOGY | Facility: CLINIC | Age: 68
End: 2018-11-15

## 2018-11-19 ENCOUNTER — PATIENT MESSAGE (OUTPATIENT)
Dept: HEMATOLOGY/ONCOLOGY | Facility: CLINIC | Age: 68
End: 2018-11-19

## 2018-12-03 ENCOUNTER — PATIENT MESSAGE (OUTPATIENT)
Dept: HEMATOLOGY/ONCOLOGY | Facility: CLINIC | Age: 68
End: 2018-12-03

## 2018-12-03 DIAGNOSIS — R63.4 WEIGHT LOSS: Primary | ICD-10-CM

## 2018-12-31 DIAGNOSIS — F41.9 ANXIETY: ICD-10-CM

## 2018-12-31 RX ORDER — ATORVASTATIN CALCIUM 20 MG/1
20 TABLET, FILM COATED ORAL DAILY
Qty: 90 TABLET | Refills: 3 | Status: SHIPPED | OUTPATIENT
Start: 2018-12-31 | End: 2020-03-19

## 2018-12-31 RX ORDER — ATORVASTATIN CALCIUM 20 MG/1
TABLET, FILM COATED ORAL
Qty: 90 TABLET | Refills: 3 | Status: SHIPPED | OUTPATIENT
Start: 2018-12-31 | End: 2018-12-31 | Stop reason: SDUPTHER

## 2018-12-31 RX ORDER — LORAZEPAM 1 MG/1
1 TABLET ORAL EVERY 12 HOURS PRN
Qty: 30 TABLET | Refills: 2 | Status: SHIPPED | OUTPATIENT
Start: 2018-12-31 | End: 2019-12-26 | Stop reason: SDUPTHER

## 2019-01-02 ENCOUNTER — HOSPITAL ENCOUNTER (OUTPATIENT)
Dept: RADIOLOGY | Facility: OTHER | Age: 69
Discharge: HOME | End: 2019-01-02
Attending: PHYSICIAN ASSISTANT
Payer: MEDICARE

## 2019-01-02 ENCOUNTER — PATIENT MESSAGE (OUTPATIENT)
Dept: HEMATOLOGY/ONCOLOGY | Facility: CLINIC | Age: 69
End: 2019-01-02

## 2019-01-02 DIAGNOSIS — C50.412 MALIGNANT NEOPLASM OF UPPER-OUTER QUADRANT OF LEFT BREAST IN FEMALE, ESTROGEN RECEPTOR POSITIVE: ICD-10-CM

## 2019-01-02 DIAGNOSIS — Z17.0 MALIGNANT NEOPLASM OF UPPER-OUTER QUADRANT OF LEFT BREAST IN FEMALE, ESTROGEN RECEPTOR POSITIVE: ICD-10-CM

## 2019-01-02 PROCEDURE — 77049 MRI BREAST C-+ W/CAD BI: CPT | Mod: 26,,, | Performed by: RADIOLOGY

## 2019-01-02 PROCEDURE — 77049 MRI BREAST BILATERAL W W/O CONTRAST: ICD-10-PCS | Mod: 26,,, | Performed by: RADIOLOGY

## 2019-01-02 PROCEDURE — 25500020 PHARM REV CODE 255: Performed by: PHYSICIAN ASSISTANT

## 2019-01-02 PROCEDURE — A9577 INJ MULTIHANCE: HCPCS | Performed by: PHYSICIAN ASSISTANT

## 2019-01-02 PROCEDURE — 77049 MRI BREAST C-+ W/CAD BI: CPT | Mod: TC

## 2019-01-02 RX ADMIN — GADOBENATE DIMEGLUMINE 10 ML: 529 INJECTION, SOLUTION INTRAVENOUS at 03:01

## 2019-01-03 ENCOUNTER — PATIENT MESSAGE (OUTPATIENT)
Dept: HEMATOLOGY/ONCOLOGY | Facility: CLINIC | Age: 69
End: 2019-01-03

## 2019-01-03 ENCOUNTER — TELEPHONE (OUTPATIENT)
Dept: HEMATOLOGY/ONCOLOGY | Facility: CLINIC | Age: 69
End: 2019-01-03

## 2019-01-03 NOTE — TELEPHONE ENCOUNTER
Spoke with pt to relay MRI result findings where the overall impression was no significant masses, enhancements, or other abnormal findings seen. Informed pt Dr Salazar could go into more detail with her about the results when she sees him Monday 1/7/19. Pt verbalized understanding of this information.      ----- Message from Linda Jonas PA-C sent at 1/2/2019  9:39 PM CST -----  Do you mind calling her tomorrow with her breast MRI results once they are resulted?  If there is anything suspicious on the read out just text/call me first and I can walk through it with you. I'm out of the office lilian but she was very anxious to get the results. Thanks!  My # is [phone]  linda

## 2019-01-07 ENCOUNTER — OFFICE VISIT (OUTPATIENT)
Dept: HEMATOLOGY/ONCOLOGY | Facility: CLINIC | Age: 69
End: 2019-01-07
Payer: MEDICARE

## 2019-01-07 VITALS
BODY MASS INDEX: 18.21 KG/M2 | HEIGHT: 64 IN | HEART RATE: 79 BPM | DIASTOLIC BLOOD PRESSURE: 67 MMHG | TEMPERATURE: 98 F | RESPIRATION RATE: 20 BRPM | WEIGHT: 106.69 LBS | OXYGEN SATURATION: 99 % | SYSTOLIC BLOOD PRESSURE: 135 MMHG

## 2019-01-07 DIAGNOSIS — Z17.0 MALIGNANT NEOPLASM OF UPPER-OUTER QUADRANT OF LEFT BREAST IN FEMALE, ESTROGEN RECEPTOR POSITIVE: Primary | ICD-10-CM

## 2019-01-07 DIAGNOSIS — C50.412 MALIGNANT NEOPLASM OF UPPER-OUTER QUADRANT OF LEFT BREAST IN FEMALE, ESTROGEN RECEPTOR POSITIVE: Primary | ICD-10-CM

## 2019-01-07 DIAGNOSIS — M81.8 OSTEOPOROSIS, IDIOPATHIC: ICD-10-CM

## 2019-01-07 PROCEDURE — 99999 PR PBB SHADOW E&M-EST. PATIENT-LVL IV: CPT | Mod: PBBFAC,,, | Performed by: INTERNAL MEDICINE

## 2019-01-07 PROCEDURE — 99213 PR OFFICE/OUTPT VISIT, EST, LEVL III, 20-29 MIN: ICD-10-PCS | Mod: S$PBB,,, | Performed by: INTERNAL MEDICINE

## 2019-01-07 PROCEDURE — 99213 OFFICE O/P EST LOW 20 MIN: CPT | Mod: S$PBB,,, | Performed by: INTERNAL MEDICINE

## 2019-01-07 PROCEDURE — 99999 PR PBB SHADOW E&M-EST. PATIENT-LVL IV: ICD-10-PCS | Mod: PBBFAC,,, | Performed by: INTERNAL MEDICINE

## 2019-01-07 PROCEDURE — 99214 OFFICE O/P EST MOD 30 MIN: CPT | Mod: PBBFAC | Performed by: INTERNAL MEDICINE

## 2019-01-07 NOTE — PROGRESS NOTES
Subjective:       Patient ID: Saritha Guzman is a 68 y.o. female.    Chief Complaint: No chief complaint on file.    HPI Ms Guzman is a 69 y/o WF Is seen for F/U of left breast cancer. She had stage I ER positive disease with a high Oncotype score. She is currently on letrozole.   MRI last week was negative.           History: mammogram in June 2013 showed pleomorphic calcifications left breast at the 2:00 position. By ultrasound there was a 2.4 cm asymmetric density.     A core needle biopsy showed LCIS and an MRI of the breast showed an irregular mass at the 2:00 position measured 3.9 x 2.1 cm with no adenopathy. 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 On July 12 Lumpectomy showed multiple foci of infiltrating lobular carcinoma, With the largest measuring 1.1 cm. histologic grade 3 nuclear grade 2 mitotic index 2.   The tumor was 50% ER positive-which would've moderate, [place] negative and HER-2 negative.     Subsequently she underwent a left mastectomy with sentinel lymph node biopsy which showed only some residual LCIS. The sentinel lymph node was negative. Final pathological stage TI CN0 stage IA. Oncotype score was high.     She had 4 cycles of adjuvant Taxotere and Cytoxan completed January 2014 and then began adjuvant letrozole therapy.  Review of Systems   Constitutional: Negative for appetite change and unexpected weight change.   Eyes: Negative for visual disturbance.   Respiratory: Negative for cough and shortness of breath.    Cardiovascular: Negative for chest pain.   Gastrointestinal: Negative for abdominal pain and diarrhea.   Genitourinary: Negative for frequency.   Musculoskeletal: Negative for back pain.   Skin: Negative for rash.   Neurological: Negative for headaches.   Hematological: Negative for adenopathy.   Psychiatric/Behavioral: The patient is not nervous/anxious.        Objective:      Physical Exam   Constitutional: She  is oriented to person, place, and time. She appears well-developed and well-nourished. No distress.   HENT:   Mouth/Throat: Oropharynx is clear and moist. No oropharyngeal exudate.   Eyes: No scleral icterus.   Cardiovascular: Normal rate and regular rhythm.   Pulmonary/Chest: Effort normal and breath sounds normal. She has no wheezes. She has no rales.   Abdominal: Soft. She exhibits no mass. There is no tenderness.   Lymphadenopathy:     She has no cervical adenopathy.   Neurological: She is alert and oriented to person, place, and time.   Skin: No rash noted.   Psychiatric: She has a normal mood and affect. Her behavior is normal. Thought content normal.       Assessment:     breast MRI- negative    1. Malignant neoplasm of upper-outer quadrant of left breast in female, estrogen receptor positive        Plan:       Continue letrozole to complete a total of 7 years and return in  6 months with right mammogram.    Distress Screening Results: Psychosocial Distress screening score of Distress Score: 0 noted and reviewed. No intervention indicated.

## 2019-01-18 ENCOUNTER — PATIENT MESSAGE (OUTPATIENT)
Dept: INFECTIOUS DISEASES | Facility: CLINIC | Age: 69
End: 2019-01-18

## 2019-01-22 ENCOUNTER — PATIENT MESSAGE (OUTPATIENT)
Dept: INFECTIOUS DISEASES | Facility: CLINIC | Age: 69
End: 2019-01-22

## 2019-01-22 ENCOUNTER — TELEPHONE (OUTPATIENT)
Dept: INFECTIOUS DISEASES | Facility: CLINIC | Age: 69
End: 2019-01-22

## 2019-01-23 ENCOUNTER — PATIENT MESSAGE (OUTPATIENT)
Dept: INFECTIOUS DISEASES | Facility: CLINIC | Age: 69
End: 2019-01-23

## 2019-01-23 RX ORDER — AZITHROMYCIN 250 MG/1
TABLET, FILM COATED ORAL
Qty: 6 TABLET | Refills: 0 | Status: SHIPPED | OUTPATIENT
Start: 2019-01-23 | End: 2019-08-14

## 2019-01-28 ENCOUNTER — PATIENT MESSAGE (OUTPATIENT)
Dept: INFECTIOUS DISEASES | Facility: CLINIC | Age: 69
End: 2019-01-28

## 2019-04-03 ENCOUNTER — PATIENT MESSAGE (OUTPATIENT)
Dept: HEMATOLOGY/ONCOLOGY | Facility: CLINIC | Age: 69
End: 2019-04-03

## 2019-07-06 DIAGNOSIS — C50.919 BREAST CANCER, STAGE 1, UNSPECIFIED LATERALITY: ICD-10-CM

## 2019-07-08 RX ORDER — LETROZOLE 2.5 MG/1
TABLET, FILM COATED ORAL
Qty: 90 TABLET | Refills: 3 | Status: SHIPPED | OUTPATIENT
Start: 2019-07-08 | End: 2020-09-09

## 2019-07-15 ENCOUNTER — PATIENT MESSAGE (OUTPATIENT)
Dept: HEMATOLOGY/ONCOLOGY | Facility: CLINIC | Age: 69
End: 2019-07-15

## 2019-07-16 ENCOUNTER — TELEPHONE (OUTPATIENT)
Dept: INFECTIOUS DISEASES | Facility: CLINIC | Age: 69
End: 2019-07-16

## 2019-07-16 DIAGNOSIS — C50.412 MALIGNANT NEOPLASM OF UPPER-OUTER QUADRANT OF LEFT FEMALE BREAST, UNSPECIFIED ESTROGEN RECEPTOR STATUS: ICD-10-CM

## 2019-07-16 DIAGNOSIS — I25.10 ARTERIOSCLEROTIC CORONARY ARTERY DISEASE: Primary | ICD-10-CM

## 2019-07-16 DIAGNOSIS — E78.00 PURE HYPERCHOLESTEROLEMIA: ICD-10-CM

## 2019-07-16 NOTE — TELEPHONE ENCOUNTER
----- Message from Merline Jonas PA-C sent at 7/16/2019 12:40 PM CDT -----  She would like to come in for a general internal med visit - can you have that scheduled? Let me know if a problem

## 2019-07-22 ENCOUNTER — OFFICE VISIT (OUTPATIENT)
Dept: HEMATOLOGY/ONCOLOGY | Facility: CLINIC | Age: 69
End: 2019-07-22
Payer: MEDICARE

## 2019-07-22 ENCOUNTER — HOSPITAL ENCOUNTER (OUTPATIENT)
Dept: RADIOLOGY | Facility: HOSPITAL | Age: 69
Discharge: HOME | End: 2019-07-22
Attending: INTERNAL MEDICINE
Payer: MEDICARE

## 2019-07-22 VITALS — WEIGHT: 106 LBS | BODY MASS INDEX: 18.1 KG/M2 | HEIGHT: 64 IN

## 2019-07-22 VITALS
WEIGHT: 107.56 LBS | HEART RATE: 73 BPM | BODY MASS INDEX: 18.47 KG/M2 | DIASTOLIC BLOOD PRESSURE: 70 MMHG | RESPIRATION RATE: 20 BRPM | SYSTOLIC BLOOD PRESSURE: 100 MMHG | OXYGEN SATURATION: 100 %

## 2019-07-22 DIAGNOSIS — Z17.0 MALIGNANT NEOPLASM OF UPPER-OUTER QUADRANT OF LEFT BREAST IN FEMALE, ESTROGEN RECEPTOR POSITIVE: ICD-10-CM

## 2019-07-22 DIAGNOSIS — C50.412 MALIGNANT NEOPLASM OF UPPER-OUTER QUADRANT OF LEFT FEMALE BREAST, UNSPECIFIED ESTROGEN RECEPTOR STATUS: Primary | ICD-10-CM

## 2019-07-22 DIAGNOSIS — C50.412 MALIGNANT NEOPLASM OF UPPER-OUTER QUADRANT OF LEFT BREAST IN FEMALE, ESTROGEN RECEPTOR POSITIVE: ICD-10-CM

## 2019-07-22 PROCEDURE — 77061 BREAST TOMOSYNTHESIS UNI: CPT | Mod: TC,PO

## 2019-07-22 PROCEDURE — 77065 DX MAMMO INCL CAD UNI: CPT | Mod: 26,,, | Performed by: RADIOLOGY

## 2019-07-22 PROCEDURE — 99999 PR PBB SHADOW E&M-EST. PATIENT-LVL IV: CPT | Mod: PBBFAC,,, | Performed by: PHYSICIAN ASSISTANT

## 2019-07-22 PROCEDURE — 99213 PR OFFICE/OUTPT VISIT, EST, LEVL III, 20-29 MIN: ICD-10-PCS | Mod: S$PBB,,, | Performed by: PHYSICIAN ASSISTANT

## 2019-07-22 PROCEDURE — 77061 BREAST TOMOSYNTHESIS UNI: CPT | Mod: 26,,, | Performed by: RADIOLOGY

## 2019-07-22 PROCEDURE — 99214 OFFICE O/P EST MOD 30 MIN: CPT | Mod: PBBFAC | Performed by: PHYSICIAN ASSISTANT

## 2019-07-22 PROCEDURE — 99999 PR PBB SHADOW E&M-EST. PATIENT-LVL IV: ICD-10-PCS | Mod: PBBFAC,,, | Performed by: PHYSICIAN ASSISTANT

## 2019-07-22 PROCEDURE — 77065 MAMMO DIGITAL DIAGNOSTIC RIGHT WITH TOMOSYNTHESIS_CAD: ICD-10-PCS | Mod: 26,,, | Performed by: RADIOLOGY

## 2019-07-22 PROCEDURE — 77065 DX MAMMO INCL CAD UNI: CPT | Mod: TC,PO

## 2019-07-22 PROCEDURE — 77061 MAMMO DIGITAL DIAGNOSTIC RIGHT WITH TOMOSYNTHESIS_CAD: ICD-10-PCS | Mod: 26,,, | Performed by: RADIOLOGY

## 2019-07-22 PROCEDURE — 99213 OFFICE O/P EST LOW 20 MIN: CPT | Mod: S$PBB,,, | Performed by: PHYSICIAN ASSISTANT

## 2019-07-22 NOTE — PROGRESS NOTES
Subjective:       Patient ID: Saritha Guzman is a 69 y.o. female.    Chief Complaint: Results (mammogram)    Ms Guzman is a 70y/o WF Is seen for F/U of left breast cancer. She had stage I ER positive disease with a high Oncotype score. She is currently on letrozole.      Right mammogram from today was unremarkable.     She notes some mild arthralgias as a result of taking letrozole. Otherwise doing well. Continues to exercise regularly.  No pain. No fever, chills, nausea, vomiting or shortness of breath. Appetite is good. Normal bowel and urinary function.        History: mammogram in June 2013 showed pleomorphic calcifications left breast at the 2:00 position. By ultrasound there was a 2.4 cm asymmetric density.     A core needle biopsy showed LCIS and an MRI of the breast showed an irregular mass at the 2:00 position measured 3.9 x 2.1 cm with no adenopathy. 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 On July 12 Lumpectomy showed multiple foci of infiltrating lobular carcinoma, With the largest measuring 1.1 cm. histologic grade 3 nuclear grade 2 mitotic index 2.   The tumor was 50% ER positive-which would've moderate, [place] negative and HER-2 negative.     Subsequently she underwent a left mastectomy with sentinel lymph node biopsy which showed only some residual LCIS. The sentinel lymph node was negative. Final pathological stage TI CN0 stage IA. Oncotype score was high.     She had 4 cycles of adjuvant Taxotere and Cytoxan completed January 2014 and then began adjuvant letrozole therapy.      Review of Systems   Constitutional: Negative.              HENT: Negative for mouth sores, sore throat and trouble swallowing.    Eyes: Negative for visual disturbance.   Respiratory: Negative for cough, chest tightness and shortness of breath.    Cardiovascular: Negative for chest pain and leg swelling.   Gastrointestinal: Negative for abdominal distention,  abdominal pain, blood in stool, constipation, diarrhea, nausea and vomiting.   Genitourinary: Negative for dysuria and hematuria.   Musculoskeletal: Positive for arthralgias. Negative for back pain and myalgias.   Skin: Negative for pallor and rash.   Neurological: Negative for dizziness, weakness and light-headedness.   Hematological: Negative for adenopathy. Does not bruise/bleed easily.   Psychiatric/Behavioral: Positive for sleep disturbance (some insomnia). Negative for dysphoric mood and suicidal ideas. The patient is not nervous/anxious.        Objective:      Physical Exam   Constitutional: She is oriented to person, place, and time. She appears well-developed and well-nourished. No distress.   ECOG 0  Presents alone     HENT:   Head: Normocephalic.   Mouth/Throat: Oropharynx is clear and moist. No oropharyngeal exudate.   Eyes: Conjunctivae are normal. No scleral icterus.   Neck: Normal range of motion. Neck supple. No thyromegaly present.   Pulmonary/Chest: Effort normal and breath sounds normal. No respiratory distress.   Right breast without mass or nodule. Left breast reconstruction shows well healed lateral incision, no mass, nodule or skin changes. No axillary or supraclavicular adenopathy bilaterally.    Abdominal: Soft. Bowel sounds are normal. She exhibits no distension and no mass. There is no tenderness.   No hepatosplenomegaly   Musculoskeletal: Normal range of motion. She exhibits no edema.   No spinal or paraspinal tenderness to palpation     Lymphadenopathy:     She has no cervical adenopathy.   Neurological: She is alert and oriented to person, place, and time. No cranial nerve deficit.   Skin: Skin is warm and dry. No rash noted. No erythema.   Psychiatric: She has a normal mood and affect. Her behavior is normal. Judgment and thought content normal.   Vitals reviewed.      Assessment:       1. Malignant neoplasm of upper-outer quadrant of left female breast, unspecified estrogen receptor  status        Plan:       Patient clinically doing well at 5.5 years from diagnosis. She will remain on Letrozole for 7 years (January 2021).  RTC in 6 months with MRI.   All questions answered to satisfaction of patient.

## 2019-08-01 RX ORDER — PRASTERONE 6.5 MG/1
INSERT VAGINAL
Qty: 30 EACH | Refills: 11 | Status: SHIPPED | OUTPATIENT
Start: 2019-08-01 | End: 2019-08-05 | Stop reason: SDUPTHER

## 2019-08-05 DIAGNOSIS — C50.919 BREAST CANCER, STAGE 1, UNSPECIFIED LATERALITY: ICD-10-CM

## 2019-08-05 RX ORDER — LETROZOLE 2.5 MG/1
2.5 TABLET, FILM COATED ORAL DAILY
Qty: 90 TABLET | Refills: 3 | Status: CANCELLED | OUTPATIENT
Start: 2019-08-05

## 2019-08-06 ENCOUNTER — PATIENT MESSAGE (OUTPATIENT)
Dept: OBSTETRICS AND GYNECOLOGY | Facility: CLINIC | Age: 69
End: 2019-08-06

## 2019-08-07 ENCOUNTER — TELEPHONE (OUTPATIENT)
Dept: OBSTETRICS AND GYNECOLOGY | Facility: CLINIC | Age: 69
End: 2019-08-07

## 2019-08-09 ENCOUNTER — TELEPHONE (OUTPATIENT)
Dept: OBSTETRICS AND GYNECOLOGY | Facility: CLINIC | Age: 69
End: 2019-08-09

## 2019-08-09 NOTE — TELEPHONE ENCOUNTER
Patient requires PA for Intrarosa. PA processed and pending at this time. Patient aware of PA status. GWENDOLYN Otoole.

## 2019-08-14 ENCOUNTER — HOSPITAL ENCOUNTER (OUTPATIENT)
Dept: CARDIOLOGY | Facility: CLINIC | Age: 69
Discharge: HOME | End: 2019-08-14
Attending: INTERNAL MEDICINE
Payer: MEDICARE

## 2019-08-14 ENCOUNTER — OFFICE VISIT (OUTPATIENT)
Dept: INFECTIOUS DISEASES | Facility: CLINIC | Age: 69
End: 2019-08-14
Payer: MEDICARE

## 2019-08-14 ENCOUNTER — TELEPHONE (OUTPATIENT)
Dept: INFECTIOUS DISEASES | Facility: HOSPITAL | Age: 69
End: 2019-08-14

## 2019-08-14 VITALS
HEIGHT: 64 IN | TEMPERATURE: 98 F | DIASTOLIC BLOOD PRESSURE: 67 MMHG | HEART RATE: 94 BPM | WEIGHT: 107.13 LBS | SYSTOLIC BLOOD PRESSURE: 109 MMHG | BODY MASS INDEX: 18.29 KG/M2

## 2019-08-14 DIAGNOSIS — I25.10 ARTERIOSCLEROTIC CORONARY ARTERY DISEASE: ICD-10-CM

## 2019-08-14 DIAGNOSIS — E78.00 PURE HYPERCHOLESTEROLEMIA: ICD-10-CM

## 2019-08-14 DIAGNOSIS — C50.412 MALIGNANT NEOPLASM OF UPPER-OUTER QUADRANT OF LEFT FEMALE BREAST, UNSPECIFIED ESTROGEN RECEPTOR STATUS: ICD-10-CM

## 2019-08-14 DIAGNOSIS — M81.8 OSTEOPOROSIS, IDIOPATHIC: ICD-10-CM

## 2019-08-14 DIAGNOSIS — I25.10 ARTERIOSCLEROTIC CORONARY ARTERY DISEASE: Primary | ICD-10-CM

## 2019-08-14 DIAGNOSIS — D05.02 LOBULAR CARCINOMA IN SITU (LCIS) OF LEFT BREAST: ICD-10-CM

## 2019-08-14 DIAGNOSIS — R25.2 MUSCLE CRAMPS: ICD-10-CM

## 2019-08-14 PROCEDURE — 99215 OFFICE O/P EST HI 40 MIN: CPT | Mod: S$PBB,,, | Performed by: INTERNAL MEDICINE

## 2019-08-14 PROCEDURE — 93010 EKG 12-LEAD: ICD-10-PCS | Mod: S$PBB,,, | Performed by: INTERNAL MEDICINE

## 2019-08-14 PROCEDURE — 93005 ELECTROCARDIOGRAM TRACING: CPT | Mod: PBBFAC | Performed by: INTERNAL MEDICINE

## 2019-08-14 PROCEDURE — 99999 PR PBB SHADOW E&M-EST. PATIENT-LVL III: ICD-10-PCS | Mod: PBBFAC,,, | Performed by: INTERNAL MEDICINE

## 2019-08-14 PROCEDURE — 93010 ELECTROCARDIOGRAM REPORT: CPT | Mod: S$PBB,,, | Performed by: INTERNAL MEDICINE

## 2019-08-14 PROCEDURE — 99213 OFFICE O/P EST LOW 20 MIN: CPT | Mod: PBBFAC | Performed by: INTERNAL MEDICINE

## 2019-08-14 PROCEDURE — 99999 PR PBB SHADOW E&M-EST. PATIENT-LVL III: CPT | Mod: PBBFAC,,, | Performed by: INTERNAL MEDICINE

## 2019-08-14 PROCEDURE — 99215 PR OFFICE/OUTPT VISIT, EST, LEVL V, 40-54 MIN: ICD-10-PCS | Mod: S$PBB,,, | Performed by: INTERNAL MEDICINE

## 2019-08-14 RX ORDER — ACETAMINOPHEN 500 MG
500 TABLET ORAL
Status: CANCELLED | OUTPATIENT
Start: 2019-08-14

## 2019-08-14 RX ORDER — ZOLEDRONIC ACID 5 MG/100ML
5 INJECTION, SOLUTION INTRAVENOUS ONCE
Status: CANCELLED | OUTPATIENT
Start: 2019-08-14

## 2019-08-14 RX ORDER — ONDANSETRON 4 MG/1
4 TABLET, FILM COATED ORAL EVERY 8 HOURS PRN
Qty: 12 TABLET | Refills: 3 | Status: SHIPPED | OUTPATIENT
Start: 2019-08-14 | End: 2021-10-12

## 2019-08-14 RX ORDER — SODIUM CHLORIDE 0.9 % (FLUSH) 0.9 %
10 SYRINGE (ML) INJECTION
Status: CANCELLED | OUTPATIENT
Start: 2019-08-14

## 2019-08-14 NOTE — TELEPHONE ENCOUNTER
[facility] calling to schedule the reclast infusion Dr. Richardson ordered. Please call us back at [phone] option 1 any of the nurses can schedule this for you.

## 2019-09-03 ENCOUNTER — TELEPHONE (OUTPATIENT)
Dept: OBSTETRICS AND GYNECOLOGY | Facility: CLINIC | Age: 69
End: 2019-09-03

## 2019-09-03 NOTE — TELEPHONE ENCOUNTER
P.A. for Intrarosa denied.     Patient phoned to discuss alternative options at this time. RN lvm requesting return call. GWENDOLYN Otoole.

## 2019-09-10 ENCOUNTER — PATIENT MESSAGE (OUTPATIENT)
Dept: OBSTETRICS AND GYNECOLOGY | Facility: CLINIC | Age: 69
End: 2019-09-10

## 2019-11-12 ENCOUNTER — PATIENT MESSAGE (OUTPATIENT)
Dept: HEMATOLOGY/ONCOLOGY | Facility: CLINIC | Age: 69
End: 2019-11-12

## 2019-11-21 ENCOUNTER — HOSPITAL ENCOUNTER (OUTPATIENT)
Dept: RADIOLOGY | Facility: CLINIC | Age: 69
Discharge: HOME | End: 2019-11-21
Attending: INTERNAL MEDICINE
Payer: MEDICARE

## 2019-11-21 DIAGNOSIS — M81.8 OSTEOPOROSIS, IDIOPATHIC: ICD-10-CM

## 2019-11-21 PROCEDURE — 77080 DEXA BONE DENSITY SPINE HIP: ICD-10-PCS | Mod: 26,,, | Performed by: INTERNAL MEDICINE

## 2019-11-21 PROCEDURE — 77080 DXA BONE DENSITY AXIAL: CPT | Mod: 26,,, | Performed by: INTERNAL MEDICINE

## 2019-11-21 PROCEDURE — 77080 DXA BONE DENSITY AXIAL: CPT | Mod: TC

## 2019-12-06 ENCOUNTER — PATIENT MESSAGE (OUTPATIENT)
Dept: INFECTIOUS DISEASES | Facility: CLINIC | Age: 69
End: 2019-12-06

## 2019-12-06 ENCOUNTER — PATIENT MESSAGE (OUTPATIENT)
Dept: HEMATOLOGY/ONCOLOGY | Facility: CLINIC | Age: 69
End: 2019-12-06

## 2019-12-06 DIAGNOSIS — Z17.0 MALIGNANT NEOPLASM OF UPPER-OUTER QUADRANT OF LEFT BREAST IN FEMALE, ESTROGEN RECEPTOR POSITIVE: ICD-10-CM

## 2019-12-06 DIAGNOSIS — C50.412 MALIGNANT NEOPLASM OF UPPER-OUTER QUADRANT OF LEFT FEMALE BREAST, UNSPECIFIED ESTROGEN RECEPTOR STATUS: Primary | ICD-10-CM

## 2019-12-06 DIAGNOSIS — C50.412 MALIGNANT NEOPLASM OF UPPER-OUTER QUADRANT OF LEFT BREAST IN FEMALE, ESTROGEN RECEPTOR POSITIVE: ICD-10-CM

## 2019-12-06 DIAGNOSIS — M81.8 OSTEOPOROSIS, IDIOPATHIC: ICD-10-CM

## 2019-12-06 RX ORDER — HEPARIN SODIUM (PORCINE) LOCK FLUSH IV SOLN 100 UNIT/ML 100 UNIT/ML
100 SOLUTION INTRAVENOUS
Status: CANCELLED | OUTPATIENT
Start: 2019-12-06

## 2019-12-12 ENCOUNTER — INFUSION (OUTPATIENT)
Dept: INFUSION THERAPY | Facility: HOSPITAL | Age: 69
End: 2019-12-12
Attending: INTERNAL MEDICINE
Payer: MEDICARE

## 2019-12-12 VITALS
BODY MASS INDEX: 18.61 KG/M2 | WEIGHT: 105 LBS | DIASTOLIC BLOOD PRESSURE: 86 MMHG | TEMPERATURE: 98 F | SYSTOLIC BLOOD PRESSURE: 139 MMHG | HEIGHT: 63 IN | HEART RATE: 80 BPM | RESPIRATION RATE: 18 BRPM

## 2019-12-12 DIAGNOSIS — M81.8 OSTEOPOROSIS, IDIOPATHIC: Primary | ICD-10-CM

## 2019-12-12 PROCEDURE — 63600175 PHARM REV CODE 636 W HCPCS: Performed by: INTERNAL MEDICINE

## 2019-12-12 PROCEDURE — 96365 THER/PROPH/DIAG IV INF INIT: CPT

## 2019-12-12 RX ORDER — ZOLEDRONIC ACID 5 MG/100ML
5 INJECTION, SOLUTION INTRAVENOUS ONCE
Status: COMPLETED | OUTPATIENT
Start: 2019-12-12 | End: 2019-12-12

## 2019-12-12 RX ORDER — ZOLEDRONIC ACID 5 MG/100ML
5 INJECTION, SOLUTION INTRAVENOUS ONCE
Status: CANCELLED | OUTPATIENT
Start: 2019-12-12

## 2019-12-12 RX ORDER — SODIUM CHLORIDE 0.9 % (FLUSH) 0.9 %
10 SYRINGE (ML) INJECTION
Status: CANCELLED | OUTPATIENT
Start: 2019-12-12

## 2019-12-12 RX ORDER — SODIUM CHLORIDE 0.9 % (FLUSH) 0.9 %
10 SYRINGE (ML) INJECTION
Status: DISCONTINUED | OUTPATIENT
Start: 2019-12-12 | End: 2019-12-12 | Stop reason: HOSPADM

## 2019-12-12 RX ORDER — HEPARIN SODIUM (PORCINE) LOCK FLUSH IV SOLN 100 UNIT/ML 100 UNIT/ML
100 SOLUTION INTRAVENOUS
Status: DISCONTINUED | OUTPATIENT
Start: 2019-12-12 | End: 2019-12-12 | Stop reason: HOSPADM

## 2019-12-12 RX ORDER — ACETAMINOPHEN 500 MG
500 TABLET ORAL
Status: DISCONTINUED | OUTPATIENT
Start: 2019-12-12 | End: 2019-12-12 | Stop reason: HOSPADM

## 2019-12-12 RX ORDER — HEPARIN SODIUM (PORCINE) LOCK FLUSH IV SOLN 100 UNIT/ML 100 UNIT/ML
100 SOLUTION INTRAVENOUS
Status: CANCELLED | OUTPATIENT
Start: 2019-12-12

## 2019-12-12 RX ORDER — ACETAMINOPHEN 500 MG
500 TABLET ORAL
Status: CANCELLED | OUTPATIENT
Start: 2019-12-12

## 2019-12-12 RX ADMIN — ZOLEDRONIC ACID 5 MG: 5 INJECTION, SOLUTION INTRAVENOUS at 04:12

## 2019-12-26 ENCOUNTER — PATIENT MESSAGE (OUTPATIENT)
Dept: HEMATOLOGY/ONCOLOGY | Facility: CLINIC | Age: 69
End: 2019-12-26

## 2019-12-26 DIAGNOSIS — F41.9 ANXIETY: ICD-10-CM

## 2019-12-26 RX ORDER — LORAZEPAM 1 MG/1
1 TABLET ORAL EVERY 12 HOURS PRN
Qty: 30 TABLET | Refills: 2 | Status: SHIPPED | OUTPATIENT
Start: 2019-12-26 | End: 2022-05-23 | Stop reason: SDUPTHER

## 2020-01-09 ENCOUNTER — TELEPHONE (OUTPATIENT)
Dept: OBSTETRICS AND GYNECOLOGY | Facility: CLINIC | Age: 70
End: 2020-01-09

## 2020-01-09 NOTE — TELEPHONE ENCOUNTER
----- Message from Rere Oliva sent at 1/9/2020  2:48 PM CST -----  Contact: Lorrie with [facility]  Type: RX Refill Request    Who Called: Lorrie with [facility]    RX Name and Strength: prasterone, dhea, (INTRAROSA) 6.5 mg Inst    Preferred Pharmacy with phone number: [id number] [place] [address] [address]  [address] [facility] [address]    Best Call Back Number: [phone]    Additional Information: needs prior authorization

## 2020-01-13 ENCOUNTER — TELEPHONE (OUTPATIENT)
Dept: OBSTETRICS AND GYNECOLOGY | Facility: CLINIC | Age: 70
End: 2020-01-13

## 2020-01-13 ENCOUNTER — PATIENT MESSAGE (OUTPATIENT)
Dept: OBSTETRICS AND GYNECOLOGY | Facility: CLINIC | Age: 70
End: 2020-01-13

## 2020-01-13 NOTE — TELEPHONE ENCOUNTER
RN Navigator phoned [employer] [phone] and provided the necessary information for a prior authorization. Patient notified of her pending prior authorization. Ref #[id number].GWENDOLYN Otoole.

## 2020-01-20 ENCOUNTER — HOSPITAL ENCOUNTER (OUTPATIENT)
Dept: RADIOLOGY | Facility: HOSPITAL | Age: 70
Discharge: HOME | End: 2020-01-20
Attending: PHYSICIAN ASSISTANT
Payer: MEDICARE

## 2020-01-20 DIAGNOSIS — C50.412 MALIGNANT NEOPLASM OF UPPER-OUTER QUADRANT OF LEFT FEMALE BREAST, UNSPECIFIED ESTROGEN RECEPTOR STATUS: ICD-10-CM

## 2020-01-20 PROCEDURE — C8937 CAD BREAST MRI: HCPCS | Mod: TC

## 2020-01-20 PROCEDURE — 77049 MRI BREAST W/WO CONTRAST, W/CAD, BILATERAL: ICD-10-PCS | Mod: 26,,, | Performed by: RADIOLOGY

## 2020-01-20 PROCEDURE — A9577 INJ MULTIHANCE: HCPCS | Performed by: PHYSICIAN ASSISTANT

## 2020-01-20 PROCEDURE — 77049 MRI BREAST C-+ W/CAD BI: CPT | Mod: 26,,, | Performed by: RADIOLOGY

## 2020-01-20 PROCEDURE — 25500020 PHARM REV CODE 255: Performed by: PHYSICIAN ASSISTANT

## 2020-01-20 RX ADMIN — GADOBENATE DIMEGLUMINE 10 ML: 529 INJECTION, SOLUTION INTRAVENOUS at 10:01

## 2020-01-22 ENCOUNTER — OFFICE VISIT (OUTPATIENT)
Dept: HEMATOLOGY/ONCOLOGY | Facility: CLINIC | Age: 70
End: 2020-01-22
Payer: MEDICARE

## 2020-01-22 VITALS
SYSTOLIC BLOOD PRESSURE: 146 MMHG | TEMPERATURE: 98 F | DIASTOLIC BLOOD PRESSURE: 68 MMHG | BODY MASS INDEX: 17.89 KG/M2 | HEIGHT: 64 IN | WEIGHT: 104.81 LBS | RESPIRATION RATE: 16 BRPM | OXYGEN SATURATION: 100 % | HEART RATE: 94 BPM

## 2020-01-22 DIAGNOSIS — C50.412 MALIGNANT NEOPLASM OF UPPER-OUTER QUADRANT OF LEFT BREAST IN FEMALE, ESTROGEN RECEPTOR POSITIVE: Primary | ICD-10-CM

## 2020-01-22 DIAGNOSIS — Z17.0 MALIGNANT NEOPLASM OF UPPER-OUTER QUADRANT OF LEFT BREAST IN FEMALE, ESTROGEN RECEPTOR POSITIVE: Primary | ICD-10-CM

## 2020-01-22 DIAGNOSIS — Z12.31 ENCOUNTER FOR SCREENING MAMMOGRAM FOR MALIGNANT NEOPLASM OF BREAST: ICD-10-CM

## 2020-01-22 PROCEDURE — 99214 OFFICE O/P EST MOD 30 MIN: CPT | Mod: PBBFAC | Performed by: INTERNAL MEDICINE

## 2020-01-22 PROCEDURE — 1159F MED LIST DOCD IN RCRD: CPT | Mod: ,,, | Performed by: INTERNAL MEDICINE

## 2020-01-22 PROCEDURE — 99999 PR PBB SHADOW E&M-EST. PATIENT-LVL IV: ICD-10-PCS | Mod: PBBFAC,,, | Performed by: INTERNAL MEDICINE

## 2020-01-22 PROCEDURE — 1126F PR PAIN SEVERITY QUANTIFIED, NO PAIN PRESENT: ICD-10-PCS | Mod: ,,, | Performed by: INTERNAL MEDICINE

## 2020-01-22 PROCEDURE — 99213 PR OFFICE/OUTPT VISIT, EST, LEVL III, 20-29 MIN: ICD-10-PCS | Mod: S$PBB,,, | Performed by: INTERNAL MEDICINE

## 2020-01-22 PROCEDURE — 99999 PR PBB SHADOW E&M-EST. PATIENT-LVL IV: CPT | Mod: PBBFAC,,, | Performed by: INTERNAL MEDICINE

## 2020-01-22 PROCEDURE — 99213 OFFICE O/P EST LOW 20 MIN: CPT | Mod: S$PBB,,, | Performed by: INTERNAL MEDICINE

## 2020-01-22 PROCEDURE — 1126F AMNT PAIN NOTED NONE PRSNT: CPT | Mod: ,,, | Performed by: INTERNAL MEDICINE

## 2020-01-22 PROCEDURE — 1159F PR MEDICATION LIST DOCUMENTED IN MEDICAL RECORD: ICD-10-PCS | Mod: ,,, | Performed by: INTERNAL MEDICINE

## 2020-01-22 NOTE — PROGRESS NOTES
Subjective:       Patient ID: Saritha Guzman is a 69 y.o. female.    Chief Complaint: No chief complaint on file.    HPI Ms Guzman is a 68y/o WF Is seen for F/U of left breast cancer. She had stage I ER positive disease with a high Oncotype score. She is currently on letrozole.     She continues on atorvastatin for her hyperlipidemia with good control.  She is on zoledronic acid for osteoporosis and received her last infusion in December 2019.     History: mammogram in June 2013 showed pleomorphic calcifications left breast at the 2:00 position. By ultrasound there was a 2.4 cm asymmetric density.     A core needle biopsy showed LCIS and an MRI of the breast showed an irregular mass at the 2:00 position measured 3.9 x 2.1 cm with no adenopathy. 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 On July 12 Lumpectomy showed multiple foci of infiltrating lobular carcinoma, With the largest measuring 1.1 cm. histologic grade 3 nuclear grade 2 mitotic index 2.   The tumor was 50% ER positive-which would've moderate, [place] negative and HER-2 negative.     Subsequently she underwent a left mastectomy with sentinel lymph node biopsy which showed only some residual LCIS. The sentinel lymph node was negative. Final pathological stage TI CN0 stage IA. Oncotype score was high.     She had 4 cycles of adjuvant Taxotere and Cytoxan completed January 2014 and then began adjuvant letrozole therapy.  Review of Systems   Constitutional: Negative for appetite change and unexpected weight change.   Eyes: Negative for visual disturbance.   Respiratory: Negative for cough and shortness of breath.    Cardiovascular: Negative for chest pain.   Gastrointestinal: Positive for diarrhea. Negative for abdominal pain.   Genitourinary: Negative for frequency.   Musculoskeletal: Positive for back pain.   Skin: Negative for rash.   Neurological: Positive for headaches.   Hematological:  Negative for adenopathy.   Psychiatric/Behavioral: The patient is nervous/anxious.        Objective:      Physical Exam   Constitutional: She is oriented to person, place, and time. She appears well-developed and well-nourished.   Eyes: No scleral icterus.   Cardiovascular: Normal rate and regular rhythm.   Pulmonary/Chest: Effort normal and breath sounds normal. She has no wheezes. She has no rales.   Abdominal: Soft. She exhibits no mass. There is no tenderness.   Lymphadenopathy:     She has no cervical adenopathy.   Neurological: She is alert and oriented to person, place, and time.   Psychiatric: She has a normal mood and affect. Her behavior is normal. Thought content normal.   Vitals reviewed.      Assessment:     breast MRI is negative for malignancy.  1. Malignant neoplasm of upper-outer quadrant of left breast in female, estrogen receptor positive        Plan:       Continue current therapy and return in 6 months.    Screening mammogram at that time.

## 2020-02-19 ENCOUNTER — PATIENT MESSAGE (OUTPATIENT)
Dept: INFECTIOUS DISEASES | Facility: CLINIC | Age: 70
End: 2020-02-19

## 2020-03-19 RX ORDER — ATORVASTATIN CALCIUM 20 MG/1
TABLET, FILM COATED ORAL
Qty: 90 TABLET | Refills: 3 | Status: SHIPPED | OUTPATIENT
Start: 2020-03-19 | End: 2021-03-26 | Stop reason: SDUPTHER

## 2020-04-16 ENCOUNTER — PATIENT MESSAGE (OUTPATIENT)
Dept: HEMATOLOGY/ONCOLOGY | Facility: CLINIC | Age: 70
End: 2020-04-16

## 2020-04-16 ENCOUNTER — OFFICE VISIT (OUTPATIENT)
Dept: INFECTIOUS DISEASES | Facility: CLINIC | Age: 70
End: 2020-04-16
Payer: MEDICARE

## 2020-04-16 DIAGNOSIS — H00.12 CHALAZION OF RIGHT LOWER EYELID: Primary | ICD-10-CM

## 2020-04-16 PROCEDURE — 99213 PR OFFICE/OUTPT VISIT, EST, LEVL III, 20-29 MIN: ICD-10-PCS | Mod: 95,,, | Performed by: INTERNAL MEDICINE

## 2020-04-16 PROCEDURE — 99213 OFFICE O/P EST LOW 20 MIN: CPT | Mod: 95,,, | Performed by: INTERNAL MEDICINE

## 2020-04-16 RX ORDER — NEOMYCIN SULFATE, POLYMYXIN B SULFATE, AND DEXAMETHASONE 3.5; 10000; 1 MG/G; [USP'U]/G; MG/G
OINTMENT OPHTHALMIC EVERY 6 HOURS
Qty: 3.5 G | Refills: 1 | Status: SHIPPED | OUTPATIENT
Start: 2020-04-16 | End: 2020-04-18 | Stop reason: SDUPTHER

## 2020-04-16 NOTE — PROGRESS NOTES
Subjective:      Patient ID: Saritha Guzman is a 69 y.o. female.    Chief Complaint:  swollen right lower eyelid      History of Present Illness  The patient location is: home  The chief complaint leading to consultation is: swollen right eyelid  Visit type: facetime virtual visit, but problems with internet caused us to revert to audio call. I have a photo emailed to me of eye.  Total time spent with patient: 15 min  Each patient to whom he or she provides medical services by telemedicine is:  (1) informed of the relationship between the physician and patient and the respective role of any other health care provider with respect to management of the patient; and (2) notified that he or she may decline to receive medical services by telemedicine and may withdraw from such care at any time.    Notes:    Several days ago noted itchy, mildly red, mildly swollen right lower eyelid. Used some hot compresses but not better. Had a history of similar event in 2017 and saw Dr. Montrell Cyr who prescribed hot compresses and some type of topical ointment. No fever and only mild local discomfort. She feels fine systemically and wants to go to yoga class this afternoon. I found Dr. Cyr's note from 2017 and he felt this was a chalazion cyst and gave her maxitrol ophthalmic ointment. She feels that this is the same thing she had then.    Review of Systems   Constitution: Negative for chills, decreased appetite, fever, malaise/fatigue, night sweats, weight gain and weight loss.   HENT: Negative for congestion, ear pain, hearing loss, hoarse voice, sore throat and tinnitus.    Eyes: Negative for blurred vision, redness and visual disturbance.   Cardiovascular: Negative for chest pain, leg swelling and palpitations.   Respiratory: Negative for cough, hemoptysis, shortness of breath and sputum production.    Hematologic/Lymphatic: Negative for adenopathy. Does not bruise/bleed easily.   Skin: Negative for dry skin, itching, rash and  suspicious lesions.   Musculoskeletal: Negative for back pain, joint pain, myalgias and neck pain.   Gastrointestinal: Negative for abdominal pain, constipation, diarrhea, heartburn, nausea and vomiting.   Genitourinary: Negative for dysuria, flank pain, frequency, hematuria, hesitancy and urgency.   Neurological: Negative for dizziness, headaches, numbness, paresthesias and weakness.   Psychiatric/Behavioral: Negative for depression and memory loss. The patient does not have insomnia and is not nervous/anxious.      Objective:   Physical Exam   Constitutional: She is oriented to person, place, and time. She appears well-developed and well-nourished. No distress.   Eyes:   Puffiness of right eye, mild and from photo may be upper and lower lids. No clear erythema. No conjunctival injection.   Neurological: She is alert and oriented to person, place, and time.     Assessment:       1. Chalazion of right lower eyelid          Plan:       1. maxitrol ointment q6h   2. Hot compresses every 6 hours   3. If not improved in 48 hrs, call me for ophthalmology referral

## 2020-04-18 ENCOUNTER — TELEPHONE (OUTPATIENT)
Dept: INFECTIOUS DISEASES | Facility: CLINIC | Age: 70
End: 2020-04-18

## 2020-04-18 RX ORDER — NEOMYCIN SULFATE, POLYMYXIN B SULFATE, AND DEXAMETHASONE 3.5; 10000; 1 MG/G; [USP'U]/G; MG/G
OINTMENT OPHTHALMIC EVERY 6 HOURS
Qty: 3.5 G | Refills: 1 | Status: SHIPPED | OUTPATIENT
Start: 2020-04-18 | End: 2020-08-18 | Stop reason: SDUPTHER

## 2020-05-20 ENCOUNTER — PATIENT MESSAGE (OUTPATIENT)
Dept: INTERNAL MEDICINE | Facility: CLINIC | Age: 70
End: 2020-05-20

## 2020-07-10 NOTE — PROGRESS NOTES
Subjective:       Patient ID: Saritha Guzman is a 70 y.o. female.    Chief Complaint: Follow-up    HPI Ms Guzman is a 69y/o WF Is seen for F/U of left breast cancer. She had stage I ER positive disease with a high Oncotype score. She is currently on letrozole.    She is still exercising and staying active. Eating and drinking well.   She notes she has had some sleep disturbances and wakes up screaming.   Follows with Dr. Jiang.     She continues on atorvastatin for her hyperlipidemia with good control.  She is on zoledronic acid for osteoporosis and received her last infusion in December 2019.     History: mammogram in June 2013 showed pleomorphic calcifications left breast at the 2:00 position. By ultrasound there was a 2.4 cm asymmetric density.     A core needle biopsy showed LCIS and an MRI of the breast showed an irregular mass at the 2:00 position measured 3.9 x 2.1 cm with no adenopathy. 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 On July 12 Lumpectomy showed multiple foci of infiltrating lobular carcinoma, With the largest measuring 1.1 cm. histologic grade 3 nuclear grade 2 mitotic index 2.   The tumor was 50% ER positive-which would've moderate, NV negative and HER-2 negative.     Subsequently she underwent a left mastectomy with sentinel lymph node biopsy which showed only some residual LCIS. The sentinel lymph node was negative. Final pathological stage TI CN0 stage IA. Oncotype score was high.     She had 4 cycles of adjuvant Taxotere and Cytoxan completed January 2014 and then began adjuvant letrozole therapy.     Review of Systems   Constitutional: Negative for activity change, appetite change, chills, diaphoresis, fatigue, fever and unexpected weight change.   HENT: Negative for mouth sores, nosebleeds, sore throat and trouble swallowing.    Eyes: Negative for visual disturbance.   Respiratory: Negative for cough and shortness of  breath.    Cardiovascular: Negative for chest pain, palpitations and leg swelling.   Gastrointestinal: Positive for diarrhea (occasional). Negative for abdominal distention, abdominal pain, blood in stool, constipation, nausea and vomiting.   Genitourinary: Negative for frequency, hematuria and vaginal bleeding.        Vaginal Dryness   Musculoskeletal: Positive for arthralgias and back pain. Negative for myalgias.   Skin: Negative for pallor and rash.   Allergic/Immunologic: Negative for immunocompromised state.   Neurological: Negative for dizziness, weakness, light-headedness, numbness and headaches.   Hematological: Negative for adenopathy. Does not bruise/bleed easily.   Psychiatric/Behavioral: Positive for sleep disturbance. Negative for confusion. The patient is nervous/anxious.        Objective:      Physical Exam  Vitals signs reviewed.   Constitutional:       Appearance: She is well-developed.   Eyes:      General: No scleral icterus.  Cardiovascular:      Rate and Rhythm: Normal rate and regular rhythm.   Pulmonary:      Effort: Pulmonary effort is normal.      Breath sounds: Normal breath sounds. No wheezing or rales.   Abdominal:      Palpations: Abdomen is soft. There is no mass.      Tenderness: There is no abdominal tenderness.   Lymphadenopathy:      Cervical: No cervical adenopathy.   Neurological:      Mental Status: She is alert and oriented to person, place, and time.   Psychiatric:         Behavior: Behavior normal.         Thought Content: Thought content normal.         Assessment:     breast MRI is negative for malignancy.  1. Malignant neoplasm of upper-outer quadrant of left breast in female, estrogen receptor positive    2. 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 Pure hypercholesterolemia        Plan:       Continue current therapy and return in 6 months.    Screening mammogram negative today.     Return to clinic in 6 months with MD appointment and imaging.     Patient is  in agreement with the proposed treatment plan. All questions were answered to the patient's satisfaction. Patient knows to call clinic for any new or worsening symptoms and if anything is needed before the next clinic visit.          Kathleen Ramirez, SRIKANTHP-C  Hematology & Medical Oncology   [facility] [place] [address]  ph. [phone]  Fax. [phone]    Patient dicussed with collaborating physician, Dr. Salazar.

## 2020-07-15 DIAGNOSIS — Z71.89 COMPLEX CARE COORDINATION: ICD-10-CM

## 2020-07-24 ENCOUNTER — OFFICE VISIT (OUTPATIENT)
Dept: HEMATOLOGY/ONCOLOGY | Facility: CLINIC | Age: 70
End: 2020-07-24
Payer: MEDICARE

## 2020-07-24 ENCOUNTER — HOSPITAL ENCOUNTER (OUTPATIENT)
Dept: RADIOLOGY | Facility: HOSPITAL | Age: 70
Discharge: HOME | End: 2020-07-24
Attending: INTERNAL MEDICINE
Payer: MEDICARE

## 2020-07-24 VITALS
DIASTOLIC BLOOD PRESSURE: 58 MMHG | SYSTOLIC BLOOD PRESSURE: 116 MMHG | OXYGEN SATURATION: 100 % | WEIGHT: 109.56 LBS | HEART RATE: 85 BPM | HEIGHT: 64 IN | TEMPERATURE: 98 F | BODY MASS INDEX: 18.71 KG/M2 | RESPIRATION RATE: 16 BRPM

## 2020-07-24 DIAGNOSIS — Z12.31 ENCOUNTER FOR SCREENING MAMMOGRAM FOR MALIGNANT NEOPLASM OF BREAST: ICD-10-CM

## 2020-07-24 DIAGNOSIS — D05.02 LOBULAR CARCINOMA IN SITU (LCIS) OF LEFT BREAST: ICD-10-CM

## 2020-07-24 DIAGNOSIS — Z17.0 MALIGNANT NEOPLASM OF UPPER-OUTER QUADRANT OF LEFT BREAST IN FEMALE, ESTROGEN RECEPTOR POSITIVE: ICD-10-CM

## 2020-07-24 DIAGNOSIS — C50.412 MALIGNANT NEOPLASM OF UPPER-OUTER QUADRANT OF LEFT BREAST IN FEMALE, ESTROGEN RECEPTOR POSITIVE: Primary | ICD-10-CM

## 2020-07-24 DIAGNOSIS — E78.00 PURE HYPERCHOLESTEROLEMIA: ICD-10-CM

## 2020-07-24 DIAGNOSIS — C50.412 MALIGNANT NEOPLASM OF UPPER-OUTER QUADRANT OF LEFT BREAST IN FEMALE, ESTROGEN RECEPTOR POSITIVE: ICD-10-CM

## 2020-07-24 DIAGNOSIS — Z17.0 MALIGNANT NEOPLASM OF UPPER-OUTER QUADRANT OF LEFT BREAST IN FEMALE, ESTROGEN RECEPTOR POSITIVE: Primary | ICD-10-CM

## 2020-07-24 PROCEDURE — 99214 PR OFFICE/OUTPT VISIT, EST, LEVL IV, 30-39 MIN: ICD-10-PCS | Mod: S$PBB,,, | Performed by: NURSE PRACTITIONER

## 2020-07-24 PROCEDURE — 77067 SCR MAMMO BI INCL CAD: CPT | Mod: TC

## 2020-07-24 PROCEDURE — 99214 OFFICE O/P EST MOD 30 MIN: CPT | Mod: PBBFAC | Performed by: NURSE PRACTITIONER

## 2020-07-24 PROCEDURE — 77067 SCR MAMMO BI INCL CAD: CPT | Mod: 26,,, | Performed by: RADIOLOGY

## 2020-07-24 PROCEDURE — 99999 PR PBB SHADOW E&M-EST. PATIENT-LVL IV: ICD-10-PCS | Mod: PBBFAC,,, | Performed by: NURSE PRACTITIONER

## 2020-07-24 PROCEDURE — 99214 OFFICE O/P EST MOD 30 MIN: CPT | Mod: S$PBB,,, | Performed by: NURSE PRACTITIONER

## 2020-07-24 PROCEDURE — 77063 BREAST TOMOSYNTHESIS BI: CPT | Mod: 26,,, | Performed by: RADIOLOGY

## 2020-07-24 PROCEDURE — 77063 MAMMO DIGITAL SCREENING RIGHT WITH TOMOSYNTHESIS_CAD: ICD-10-PCS | Mod: 26,,, | Performed by: RADIOLOGY

## 2020-07-24 PROCEDURE — 77067 MAMMO DIGITAL SCREENING RIGHT WITH TOMOSYNTHESIS_CAD: ICD-10-PCS | Mod: 26,,, | Performed by: RADIOLOGY

## 2020-07-24 PROCEDURE — 99999 PR PBB SHADOW E&M-EST. PATIENT-LVL IV: CPT | Mod: PBBFAC,,, | Performed by: NURSE PRACTITIONER

## 2020-07-24 RX ORDER — ALPRAZOLAM 0.25 MG/1
0.25 TABLET ORAL DAILY PRN
Qty: 15 TABLET | Refills: 0 | Status: SHIPPED | OUTPATIENT
Start: 2020-07-24 | End: 2022-05-23 | Stop reason: SDUPTHER

## 2020-08-07 ENCOUNTER — TELEPHONE (OUTPATIENT)
Dept: HEMATOLOGY/ONCOLOGY | Facility: CLINIC | Age: 70
End: 2020-08-07

## 2020-08-07 NOTE — TELEPHONE ENCOUNTER
----- Message from Barbara Cuenca sent at 8/7/2020  9:07 AM CDT -----  Regarding: PT  Contact: Sosa with [employer]  Called back to speak to Mena again about the PT. Wanted to know if she could fax over the Medicare card or the number.     Fax: [phone]    Callback: [phone]

## 2020-08-17 ENCOUNTER — PATIENT MESSAGE (OUTPATIENT)
Dept: INTERNAL MEDICINE | Facility: CLINIC | Age: 70
End: 2020-08-17

## 2020-08-18 RX ORDER — NEOMYCIN SULFATE, POLYMYXIN B SULFATE, AND DEXAMETHASONE 3.5; 10000; 1 MG/G; [USP'U]/G; MG/G
OINTMENT OPHTHALMIC EVERY 6 HOURS
Qty: 3.5 G | Refills: 3 | Status: SHIPPED | OUTPATIENT
Start: 2020-08-18 | End: 2022-08-06 | Stop reason: ALTCHOICE

## 2020-08-24 ENCOUNTER — TELEPHONE (OUTPATIENT)
Dept: HEMATOLOGY/ONCOLOGY | Facility: CLINIC | Age: 70
End: 2020-08-24

## 2020-09-15 ENCOUNTER — TELEPHONE (OUTPATIENT)
Dept: INTERNAL MEDICINE | Facility: CLINIC | Age: 70
End: 2020-09-15

## 2020-09-15 DIAGNOSIS — I25.10 ARTERIOSCLEROTIC CORONARY ARTERY DISEASE: ICD-10-CM

## 2020-09-15 DIAGNOSIS — E78.00 PURE HYPERCHOLESTEROLEMIA: ICD-10-CM

## 2020-09-15 DIAGNOSIS — E04.1 THYROID NODULE: Primary | ICD-10-CM

## 2020-09-15 NOTE — TELEPHONE ENCOUNTER
----- Message from Diana Dickens MA sent at 9/15/2020 10:59 AM CDT -----  Regarding: labs and ekg  Got her scheduled for this Thurs for her annual can you enter orders

## 2020-09-17 ENCOUNTER — HOSPITAL ENCOUNTER (OUTPATIENT)
Dept: CARDIOLOGY | Facility: CLINIC | Age: 70
Discharge: HOME | End: 2020-09-17
Payer: MEDICARE

## 2020-09-17 ENCOUNTER — TELEPHONE (OUTPATIENT)
Dept: INTERNAL MEDICINE | Facility: CLINIC | Age: 70
End: 2020-09-17

## 2020-09-17 ENCOUNTER — HOSPITAL ENCOUNTER (OUTPATIENT)
Dept: RADIOLOGY | Facility: HOSPITAL | Age: 70
Discharge: HOME | End: 2020-09-17
Attending: INTERNAL MEDICINE

## 2020-09-17 ENCOUNTER — OFFICE VISIT (OUTPATIENT)
Dept: INTERNAL MEDICINE | Facility: CLINIC | Age: 70
End: 2020-09-17
Payer: MEDICARE

## 2020-09-17 ENCOUNTER — CLINICAL SUPPORT (OUTPATIENT)
Dept: INTERNAL MEDICINE | Facility: CLINIC | Age: 70
End: 2020-09-17
Payer: MEDICARE

## 2020-09-17 VITALS
HEIGHT: 63 IN | WEIGHT: 110.88 LBS | BODY MASS INDEX: 19.64 KG/M2 | DIASTOLIC BLOOD PRESSURE: 66 MMHG | SYSTOLIC BLOOD PRESSURE: 108 MMHG | HEART RATE: 78 BPM

## 2020-09-17 VITALS
BODY MASS INDEX: 19.54 KG/M2 | HEART RATE: 94 BPM | DIASTOLIC BLOOD PRESSURE: 65 MMHG | WEIGHT: 110.25 LBS | SYSTOLIC BLOOD PRESSURE: 106 MMHG | HEIGHT: 63 IN | TEMPERATURE: 97 F

## 2020-09-17 DIAGNOSIS — E04.1 THYROID NODULE: ICD-10-CM

## 2020-09-17 DIAGNOSIS — I25.10 ATHEROSCLEROSIS OF NATIVE CORONARY ARTERY OF NATIVE HEART WITHOUT ANGINA PECTORIS: ICD-10-CM

## 2020-09-17 DIAGNOSIS — I25.10 ARTERIOSCLEROTIC CORONARY ARTERY DISEASE: ICD-10-CM

## 2020-09-17 DIAGNOSIS — E78.5 HYPERLIPIDEMIA, UNSPECIFIED HYPERLIPIDEMIA TYPE: ICD-10-CM

## 2020-09-17 DIAGNOSIS — Z17.0 MALIGNANT NEOPLASM OF UPPER-OUTER QUADRANT OF LEFT BREAST IN FEMALE, ESTROGEN RECEPTOR POSITIVE: ICD-10-CM

## 2020-09-17 DIAGNOSIS — M81.8 OSTEOPOROSIS, IDIOPATHIC: ICD-10-CM

## 2020-09-17 DIAGNOSIS — C50.412 MALIGNANT NEOPLASM OF UPPER-OUTER QUADRANT OF LEFT BREAST IN FEMALE, ESTROGEN RECEPTOR POSITIVE: ICD-10-CM

## 2020-09-17 DIAGNOSIS — E78.5 HYPERLIPIDEMIA, UNSPECIFIED HYPERLIPIDEMIA TYPE: Primary | ICD-10-CM

## 2020-09-17 DIAGNOSIS — I25.10 ATHEROSCLEROSIS OF NATIVE CORONARY ARTERY OF NATIVE HEART WITHOUT ANGINA PECTORIS: Primary | ICD-10-CM

## 2020-09-17 DIAGNOSIS — Z12.11 COLON CANCER SCREENING: ICD-10-CM

## 2020-09-17 DIAGNOSIS — I70.0 AORTIC ATHEROSCLEROSIS: ICD-10-CM

## 2020-09-17 DIAGNOSIS — E78.00 PURE HYPERCHOLESTEROLEMIA: ICD-10-CM

## 2020-09-17 DIAGNOSIS — D05.02 LOBULAR CARCINOMA IN SITU (LCIS) OF LEFT BREAST: ICD-10-CM

## 2020-09-17 DIAGNOSIS — Z00.00 ENCOUNTER FOR PREVENTIVE HEALTH EXAMINATION: Primary | ICD-10-CM

## 2020-09-17 PROCEDURE — 99211 OFF/OP EST MAY X REQ PHY/QHP: CPT | Mod: PBBFAC,25,27

## 2020-09-17 PROCEDURE — G0439 PR MEDICARE ANNUAL WELLNESS SUBSEQUENT VISIT: ICD-10-PCS | Mod: S$GLB,,, | Performed by: PHYSICIAN ASSISTANT

## 2020-09-17 PROCEDURE — 75571 CT HRT W/O DYE W/CA TEST: CPT | Mod: TC

## 2020-09-17 PROCEDURE — 99999 PR PBB SHADOW E&M-EST. PATIENT-LVL IV: ICD-10-PCS | Mod: PBBFAC,,, | Performed by: INTERNAL MEDICINE

## 2020-09-17 PROCEDURE — 93010 EKG 12-LEAD: ICD-10-PCS | Mod: S$PBB,,, | Performed by: INTERNAL MEDICINE

## 2020-09-17 PROCEDURE — 99999 PR PBB SHADOW E&M-EST. PATIENT-LVL IV: CPT | Mod: PBBFAC,,, | Performed by: PHYSICIAN ASSISTANT

## 2020-09-17 PROCEDURE — 93010 ELECTROCARDIOGRAM REPORT: CPT | Mod: S$PBB,,, | Performed by: INTERNAL MEDICINE

## 2020-09-17 PROCEDURE — 99215 OFFICE O/P EST HI 40 MIN: CPT | Mod: S$PBB,,, | Performed by: INTERNAL MEDICINE

## 2020-09-17 PROCEDURE — 99999 PR PBB SHADOW E&M-EST. PATIENT-LVL IV: CPT | Mod: PBBFAC,,, | Performed by: INTERNAL MEDICINE

## 2020-09-17 PROCEDURE — 75571 CT CALCIUM SCORING CARDIAC: ICD-10-PCS | Mod: 26,,, | Performed by: RADIOLOGY

## 2020-09-17 PROCEDURE — 99215 PR OFFICE/OUTPT VISIT, EST, LEVL V, 40-54 MIN: ICD-10-PCS | Mod: S$PBB,,, | Performed by: INTERNAL MEDICINE

## 2020-09-17 PROCEDURE — G0439 PPPS, SUBSEQ VISIT: HCPCS | Mod: S$GLB,,, | Performed by: PHYSICIAN ASSISTANT

## 2020-09-17 PROCEDURE — 99999 PR PBB SHADOW E&M-EST. PATIENT-LVL I: CPT | Mod: PBBFAC,,,

## 2020-09-17 PROCEDURE — 99214 OFFICE O/P EST MOD 30 MIN: CPT | Mod: PBBFAC,25 | Performed by: INTERNAL MEDICINE

## 2020-09-17 PROCEDURE — 99999 PR PBB SHADOW E&M-EST. PATIENT-LVL IV: ICD-10-PCS | Mod: PBBFAC,,, | Performed by: PHYSICIAN ASSISTANT

## 2020-09-17 PROCEDURE — 99214 OFFICE O/P EST MOD 30 MIN: CPT | Mod: PBBFAC,25,27 | Performed by: PHYSICIAN ASSISTANT

## 2020-09-17 PROCEDURE — 75571 CT HRT W/O DYE W/CA TEST: CPT | Mod: 26,,, | Performed by: RADIOLOGY

## 2020-09-17 PROCEDURE — 99999 PR PBB SHADOW E&M-EST. PATIENT-LVL I: ICD-10-PCS | Mod: PBBFAC,,,

## 2020-09-17 PROCEDURE — 93005 ELECTROCARDIOGRAM TRACING: CPT | Mod: PBBFAC | Performed by: INTERNAL MEDICINE

## 2020-09-17 RX ORDER — EZETIMIBE 10 MG/1
10 TABLET ORAL DAILY
Qty: 30 TABLET | Refills: 3 | Status: SHIPPED | OUTPATIENT
Start: 2020-09-17 | End: 2021-01-19 | Stop reason: SDUPTHER

## 2020-09-17 RX ORDER — A/SINGAPORE/GP1908/2015 IVR-180 (AN A/MICHIGAN/45/2015 (H1N1)PDM09-LIKE VIRUS, A/HONG KONG/4801/2014, NYMC X-263B (H3N2) (AN A/HONG KONG/4801/2014-LIKE VIRUS), AND B/BRISBANE/60/2008, WILD TYPE (A B/BRISBANE/60/2008-LIKE VIRUS) 15; 15; 15 UG/.5ML; UG/.5ML; UG/.5ML
INJECTION, SUSPENSION INTRAMUSCULAR
COMMUNITY
Start: 2020-08-17 | End: 2021-10-12

## 2020-09-17 NOTE — PROGRESS NOTES
"  Saritha Guzman presented for a  Medicare AWV and comprehensive Health Risk Assessment today. The following components were reviewed and updated:    · Medical history  · Family History  · Social history  · Allergies and Current Medications  · Health Risk Assessment  · Health Maintenance  · Care Team     ** See Completed Assessments for Annual Wellness Visit within the encounter summary.**         The following assessments were completed:  · Living Situation  · CAGE  · Depression Screening  · Timed Get Up and Go  · Whisper Test  · Cognitive Function Screening  · Nutrition Screening  · ADL Screening  · PAQ Screening            Vitals:    09/17/20 1314   BP: 108/66   BP Location: Right arm   Patient Position: Sitting   BP Method: Medium (Manual)   Pulse: 78   Weight: 50.3 kg (110 lb 14.3 oz)   Height: 5' 3" (1.6 m)     Body mass index is 19.64 kg/m².  Physical Exam  Vitals signs and nursing note reviewed.   Constitutional:       Appearance: Normal appearance. She is well-developed.   HENT:      Head: Normocephalic.      Right Ear: External ear normal.      Left Ear: External ear normal.   Eyes:      Pupils: Pupils are equal, round, and reactive to light.   Cardiovascular:      Rate and Rhythm: Normal rate and regular rhythm.      Heart sounds: Normal heart sounds. No murmur. No friction rub. No gallop.    Pulmonary:      Effort: Pulmonary effort is normal. No respiratory distress.      Breath sounds: Normal breath sounds.   Abdominal:      Palpations: Abdomen is soft.      Tenderness: There is no abdominal tenderness.   Skin:     General: Skin is warm and dry.   Neurological:      Mental Status: She is alert.               Diagnoses and health risks identified today and associated recommendations/orders:    1. Encounter for preventive health examination  Assessments completed.  Preventative health recommendations reviewed.      2. Hyperlipidemia, unspecified hyperlipidemia type  Stable, controlled on current medical " therapy, followed by PCP.      3. Arteriosclerotic coronary artery disease  Stable, controlled on current medical therapy, followed by PCP, calcium score completed today, result pending.      4. 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 Salazar (heme/onc), last mammogram 7/24/20.      5. Malignant neoplasm of upper-outer quadrant of left breast in female, estrogen receptor positive  Stable, controlled on current medical therapy, followed by PCP and Dr. Salazar (heme/onc), last mammogram 7/24/20.      6. Osteoporosis, idiopathic  Stable, controlled on current medical therapy, followed by PCP, reclast yearly, last DEXA 12/2019.      7. Aortic atherosclerosis  Stable, controlled on current medical therapy, followed by PCP.       Provided Saritha with a 5-10 year written screening schedule and personal prevention plan. Recommendations were developed using the USPSTF age appropriate recommendations. Education, counseling, and referrals were provided as needed. After Visit Summary printed and given to patient which includes a list of additional screenings\tests needed.    No follow-ups on file.    Amy Burks PA-C      I offered to discuss end of life issues, including information on how to make advance directives that the patient could use to name someone who would make medical decisions on their behalf if they became too ill to make themselves.    ___Patient declined  _X_Patient is interested, I provided paper work and offered to discuss - patient has a living will and medical power of

## 2020-09-17 NOTE — PROGRESS NOTES
Subjective:       Patient ID: Saritha Guzman is a 70 y.o. female.    Chief Complaint: Annual Exam    HPI      LLQ abdominal pain and constipation  She has noted some increase in constipation in last 6 weeks and some mild LLQ discomfort. Will obtain colonoscopy as she is now due for rescreening.    Arteriosclerotic coronary artery disease   Pt has strong family history of stroke. In 2008 she had a coronary calcium score which showed her to be between the 50-75%ile for age/gender. She recalls being seen by Dr. Sanchez and given crestor which made her joints hurt. None of this is viewable in existing computer records. She was started on letrozole Jan 2014 and has had a big jump in her cholesterol and LDL. Was started on atorvastatin 10 mg with big drop in total from 284 to 175 and the dose increased to 20 mg with further drop      current lipids are suboptimal for prevention of progression of obstructive coronary disease (target LDL <70) :  Lab Results   Component Value Date    CHOL 194 09/17/2020    CHOL 164 08/14/2019    CHOL 163 07/18/2018     Lab Results   Component Value Date    HDL 56 09/17/2020    HDL 53 08/14/2019    HDL 57 07/18/2018     Lab Results   Component Value Date    LDLCALC 118.2 09/17/2020    LDLCALC 84.6 08/14/2019    LDLCALC 90.4 07/18/2018     Lab Results   Component Value Date    TRIG 99 09/17/2020    TRIG 132 08/14/2019    TRIG 78 07/18/2018     Lab Results   Component Value Date    CHOLHDL 28.9 09/17/2020    CHOLHDL 32.3 08/14/2019    CHOLHDL 35.0 07/18/2018             Lobular carcinoma in situ of breast  Followed by Dr. Salazar; doing well on letrozole. Last mammogram 7/24/20     Osteoporosis, idiopathic  On reclast annually. Last dose was 7/26/18, so she is due. Last DEXA was 12/12/19.     The laboratory studies were reviewed:  CBC, CMP, sedimentation rate, TSH and ekg were normal.     Last colonoscopy was 2011 and a rescreening interval of 10 years was recommended.     Vaccines are all up to  date.    Review of Systems   Constitution: Negative for chills, decreased appetite, fever, malaise/fatigue, night sweats, weight gain and weight loss.   HENT: Negative for congestion, ear pain, hearing loss, hoarse voice, sore throat and tinnitus.    Eyes: Negative for blurred vision, redness and visual disturbance.   Cardiovascular: Negative for chest pain, leg swelling and palpitations.   Respiratory: Negative for cough, hemoptysis, shortness of breath and sputum production.    Hematologic/Lymphatic: Negative for adenopathy. Bruises/bleeds easily.   Skin: Negative for dry skin, itching, rash and suspicious lesions.   Musculoskeletal: Positive for back pain. Negative for joint pain, myalgias and neck pain.   Gastrointestinal: Positive for bloating, constipation, diarrhea and heartburn. Negative for abdominal pain, nausea and vomiting.   Genitourinary: Negative for dysuria, flank pain, frequency, hematuria, hesitancy and urgency.   Neurological: Negative for dizziness, headaches, numbness, paresthesias and weakness.   Psychiatric/Behavioral: Negative for depression and memory loss. The patient does not have insomnia and is not nervous/anxious.        Objective:      Physical Exam  Vitals signs and nursing note reviewed.   Constitutional:       General: She is not in acute distress.     Appearance: She is well-developed. She is not diaphoretic.   HENT:      Head: Normocephalic and atraumatic.      Right Ear: Hearing normal.      Left Ear: Hearing normal.      Nose: Nose normal.      Mouth/Throat:      Pharynx: Uvula midline. No oropharyngeal exudate.   Eyes:      General: No scleral icterus.        Right eye: No discharge.         Left eye: No discharge.      Conjunctiva/sclera: Conjunctivae normal.      Pupils: Pupils are equal, round, and reactive to light.   Neck:      Musculoskeletal: Normal range of motion and neck supple.      Thyroid: No thyromegaly.      Trachea: No tracheal deviation.   Cardiovascular:       Rate and Rhythm: Normal rate and regular rhythm.      Heart sounds: Normal heart sounds. No murmur. No friction rub. No gallop.    Pulmonary:      Effort: Pulmonary effort is normal. No respiratory distress.      Breath sounds: Normal breath sounds. No stridor. No wheezing or rales.   Chest:      Chest wall: No tenderness.   Abdominal:      General: Bowel sounds are normal. There is no distension.      Palpations: Abdomen is soft.      Tenderness: There is no abdominal tenderness. There is no guarding or rebound.   Musculoskeletal: Normal range of motion.         General: No tenderness.   Lymphadenopathy:      Cervical: No cervical adenopathy.   Skin:     General: Skin is warm and dry.      Coloration: Skin is not pale.      Findings: No erythema or rash.   Neurological:      Mental Status: She is alert and oriented to person, place, and time.      Cranial Nerves: No cranial nerve deficit.      Coordination: Coordination normal.   Psychiatric:         Behavior: Behavior normal.         Thought Content: Thought content normal.         Judgment: Judgment normal.         Assessment:       1. Atherosclerosis of native coronary artery of native heart without angina pectoris    2. Colon cancer screening    3. Arteriosclerotic coronary artery disease by calcium score (2008)   4. Pure hypercholesterolemia    5. Malignant neoplasm of upper-outer quadrant of left breast in female, estrogen receptor positive    6. Thyroid nodule , stable by ultrasound between 2013 and 2015   7. Osteoporosis, idiopathic        Plan:        1. Continue present medications   2. Screening colonoscopy referral   3. Coronary calcium score and then review cholesterol rx options

## 2020-09-17 NOTE — TELEPHONE ENCOUNTER
50-75%ile ca score; LDL too high on atorava; has had statin tolerance problems in past, so will add zetia and recheck lipids in 2 mos; she is going off letrozole in Feb 2021, so will revisit lipid rx at that time  sasha

## 2020-09-17 NOTE — PATIENT INSTRUCTIONS
Counseling and Referral of Other Preventative  (Italic type indicates deductible and co-insurance are waived)    Patient Name: Saritha Guzman  Today's Date: 9/17/2020    Health Maintenance       Date Due Completion Date    Hepatitis C Screening 1950 ---    TETANUS VACCINE 06/21/1967 ---    Aspirin/Antiplatelet Therapy 06/21/1967 ---    Pneumococcal Vaccine (65+ High/Highest Risk) (2 of 2 - PPSV23) 09/25/2017 7/31/2017    Colorectal Cancer Screening 07/27/2021 7/27/2011    High Dose Statin 09/17/2021 9/17/2020    Mammogram 07/24/2022 7/24/2020    DEXA SCAN 11/21/2022 11/21/2019    Lipid Panel 08/14/2024 8/14/2019        No orders of the defined types were placed in this encounter.    The following information is provided to all patients.  This information is to help you find resources for any of the problems found today that may be affecting your health:                Living healthy guide: www.[facility].[place].[facility]      Understanding Diabetes: www.diabetes.org      Eating healthy: www.cdc.gov/healthyweight      [facility] home safety checklist: www.cdc.gov/steadi/patient.html      Agency on Aging: www.goea.[place].gov      Alcoholics anonymous (AA): www.aa.org      Physical Activity: www.randi.nih.gov/rl1hedn      Tobacco use: www.quitwithusla.org

## 2020-09-21 ENCOUNTER — PATIENT MESSAGE (OUTPATIENT)
Dept: ENDOSCOPY | Facility: HOSPITAL | Age: 70
End: 2020-09-21

## 2020-09-21 DIAGNOSIS — Z12.11 ENCOUNTER FOR SCREENING COLONOSCOPY: Primary | ICD-10-CM

## 2020-09-21 DIAGNOSIS — Z01.818 PRE-OP TESTING: Primary | ICD-10-CM

## 2020-09-21 RX ORDER — SODIUM, POTASSIUM,MAG SULFATES 17.5-3.13G
SOLUTION, RECONSTITUTED, ORAL ORAL
Qty: 1 KIT | Refills: 0 | Status: ON HOLD | OUTPATIENT
Start: 2020-09-21 | End: 2020-09-29 | Stop reason: HOSPADM

## 2020-09-26 ENCOUNTER — LAB VISIT (OUTPATIENT)
Dept: URGENT CARE | Facility: CLINIC | Age: 70
End: 2020-09-26
Payer: MEDICARE

## 2020-09-26 VITALS — TEMPERATURE: 98 F | HEART RATE: 87 BPM | OXYGEN SATURATION: 98 %

## 2020-09-26 DIAGNOSIS — Z01.818 PRE-OP TESTING: ICD-10-CM

## 2020-09-26 PROCEDURE — U0003 INFECTIOUS AGENT DETECTION BY NUCLEIC ACID (DNA OR RNA); SEVERE ACUTE RESPIRATORY SYNDROME CORONAVIRUS 2 (SARS-COV-2) (CORONAVIRUS DISEASE [COVID-19]), AMPLIFIED PROBE TECHNIQUE, MAKING USE OF HIGH THROUGHPUT TECHNOLOGIES AS DESCRIBED BY CMS-2020-01-R: HCPCS

## 2020-09-27 LAB — SARS-COV-2 RNA RESP QL NAA+PROBE: NOT DETECTED

## 2020-09-29 ENCOUNTER — HOSPITAL ENCOUNTER (OUTPATIENT)
Facility: HOSPITAL | Age: 70
Discharge: HOME | End: 2020-09-29
Attending: STUDENT IN AN ORGANIZED HEALTH CARE EDUCATION/TRAINING PROGRAM | Admitting: STUDENT IN AN ORGANIZED HEALTH CARE EDUCATION/TRAINING PROGRAM
Payer: MEDICARE

## 2020-09-29 ENCOUNTER — ANESTHESIA (OUTPATIENT)
Dept: ENDOSCOPY | Facility: HOSPITAL | Age: 70
End: 2020-09-29
Payer: MEDICARE

## 2020-09-29 ENCOUNTER — ANESTHESIA EVENT (OUTPATIENT)
Dept: ENDOSCOPY | Facility: HOSPITAL | Age: 70
End: 2020-09-29
Payer: MEDICARE

## 2020-09-29 VITALS
DIASTOLIC BLOOD PRESSURE: 74 MMHG | TEMPERATURE: 98 F | OXYGEN SATURATION: 99 % | RESPIRATION RATE: 14 BRPM | HEIGHT: 63 IN | HEART RATE: 70 BPM | BODY MASS INDEX: 18.96 KG/M2 | WEIGHT: 107 LBS | SYSTOLIC BLOOD PRESSURE: 135 MMHG

## 2020-09-29 DIAGNOSIS — K59.00 CONSTIPATION: ICD-10-CM

## 2020-09-29 DIAGNOSIS — R19.7 DIARRHEA, UNSPECIFIED TYPE: ICD-10-CM

## 2020-09-29 DIAGNOSIS — K59.00 CONSTIPATION, UNSPECIFIED CONSTIPATION TYPE: Primary | ICD-10-CM

## 2020-09-29 PROCEDURE — E9220 PRA ENDO ANESTHESIA: HCPCS | Mod: ,,, | Performed by: NURSE ANESTHETIST, CERTIFIED REGISTERED

## 2020-09-29 PROCEDURE — 88305 TISSUE EXAM BY PATHOLOGIST: CPT | Mod: 26,,, | Performed by: PATHOLOGY

## 2020-09-29 PROCEDURE — 63600175 PHARM REV CODE 636 W HCPCS: Performed by: NURSE ANESTHETIST, CERTIFIED REGISTERED

## 2020-09-29 PROCEDURE — 37000009 HC ANESTHESIA EA ADD 15 MINS: Performed by: STUDENT IN AN ORGANIZED HEALTH CARE EDUCATION/TRAINING PROGRAM

## 2020-09-29 PROCEDURE — 45380 COLONOSCOPY AND BIOPSY: CPT | Mod: ,,, | Performed by: STUDENT IN AN ORGANIZED HEALTH CARE EDUCATION/TRAINING PROGRAM

## 2020-09-29 PROCEDURE — 37000008 HC ANESTHESIA 1ST 15 MINUTES: Performed by: STUDENT IN AN ORGANIZED HEALTH CARE EDUCATION/TRAINING PROGRAM

## 2020-09-29 PROCEDURE — 45380 COLONOSCOPY AND BIOPSY: CPT | Performed by: STUDENT IN AN ORGANIZED HEALTH CARE EDUCATION/TRAINING PROGRAM

## 2020-09-29 PROCEDURE — 88305 TISSUE EXAM BY PATHOLOGIST: ICD-10-PCS | Mod: 26,,, | Performed by: PATHOLOGY

## 2020-09-29 PROCEDURE — 27201012 HC FORCEPS, HOT/COLD, DISP: Performed by: STUDENT IN AN ORGANIZED HEALTH CARE EDUCATION/TRAINING PROGRAM

## 2020-09-29 PROCEDURE — 25000003 PHARM REV CODE 250: Performed by: STUDENT IN AN ORGANIZED HEALTH CARE EDUCATION/TRAINING PROGRAM

## 2020-09-29 PROCEDURE — 88305 TISSUE EXAM BY PATHOLOGIST: CPT | Performed by: PATHOLOGY

## 2020-09-29 PROCEDURE — E9220 PRA ENDO ANESTHESIA: ICD-10-PCS | Mod: ,,, | Performed by: NURSE ANESTHETIST, CERTIFIED REGISTERED

## 2020-09-29 PROCEDURE — 45380 PR COLONOSCOPY,BIOPSY: ICD-10-PCS | Mod: ,,, | Performed by: STUDENT IN AN ORGANIZED HEALTH CARE EDUCATION/TRAINING PROGRAM

## 2020-09-29 RX ORDER — LIDOCAINE HYDROCHLORIDE 20 MG/ML
INJECTION INTRAVENOUS
Status: DISCONTINUED | OUTPATIENT
Start: 2020-09-29 | End: 2020-09-29

## 2020-09-29 RX ORDER — SODIUM CHLORIDE 9 MG/ML
INJECTION, SOLUTION INTRAVENOUS CONTINUOUS
Status: DISCONTINUED | OUTPATIENT
Start: 2020-09-29 | End: 2020-09-29 | Stop reason: HOSPADM

## 2020-09-29 RX ORDER — PROPOFOL 10 MG/ML
VIAL (ML) INTRAVENOUS CONTINUOUS PRN
Status: DISCONTINUED | OUTPATIENT
Start: 2020-09-29 | End: 2020-09-29

## 2020-09-29 RX ORDER — PROPOFOL 10 MG/ML
VIAL (ML) INTRAVENOUS
Status: DISCONTINUED | OUTPATIENT
Start: 2020-09-29 | End: 2020-09-29

## 2020-09-29 RX ADMIN — PROPOFOL 150 MCG/KG/MIN: 10 INJECTION, EMULSION INTRAVENOUS at 10:09

## 2020-09-29 RX ADMIN — LIDOCAINE HYDROCHLORIDE 40 MG: 20 INJECTION, SOLUTION INTRAVENOUS at 10:09

## 2020-09-29 RX ADMIN — SODIUM CHLORIDE: 0.9 INJECTION, SOLUTION INTRAVENOUS at 09:09

## 2020-09-29 RX ADMIN — PROPOFOL 50 MG: 10 INJECTION, EMULSION INTRAVENOUS at 10:09

## 2020-09-29 NOTE — TRANSFER OF CARE
"Anesthesia Transfer of Care Note    Patient: Saritha Guzman    Procedure(s) Performed: Procedure(s) (LRB):  COLONOSCOPY (N/A)    Patient location: GI    Anesthesia Type: general    Transport from OR: Transported from OR on room air with adequate spontaneous ventilation    Post pain: adequate analgesia    Post assessment: tolerated procedure well and no apparent anesthetic complications    Post vital signs: stable    Level of consciousness: awake    Nausea/Vomiting: no nausea/vomiting    Complications: none    Transfer of care protocol was followed      Last vitals:   Visit Vitals  BP (!) 146/78 (BP Location: Left arm, Patient Position: Lying)   Pulse 103   Temp 36.7 °C (98.1 °F) (Temporal)   Resp 16   Ht 5' 3" (1.6 m)   Wt 48.5 kg (107 lb)   LMP 01/01/1978 (Within Months)   SpO2 100%   Breastfeeding No   BMI 18.95 kg/m²     "

## 2020-09-29 NOTE — ANESTHESIA POSTPROCEDURE EVALUATION
Anesthesia Post Evaluation    Patient: Saritha Guzman    Procedure(s) Performed: Procedure(s) (LRB):  COLONOSCOPY (N/A)    Final Anesthesia Type: general    Patient location during evaluation: GI PACU  Patient participation: Yes- Able to Participate  Level of consciousness: awake and alert  Post-procedure vital signs: reviewed and stable  Pain management: adequate  Airway patency: patent    PONV status at discharge: No PONV  Anesthetic complications: no      Cardiovascular status: blood pressure returned to baseline  Respiratory status: unassisted, spontaneous ventilation and room air  Hydration status: euvolemic  Follow-up not needed.          Vitals Value Taken Time   /74 09/29/20 1102   Temp 36.7 °C (98.1 °F) 09/29/20 1102   Pulse 70 09/29/20 1102   Resp 14 09/29/20 1102   SpO2 99 % 09/29/20 1102         Event Time   Out of Recovery 11:04:11         Pain/Amanda Score: Amanda Score: 10 (9/29/2020 11:00 AM)

## 2020-09-29 NOTE — [employer] PATIENT INSTRUCTIONS
Discharge Summary/Instructions after an Endoscopic Procedure  Patient Name: Saritha Guzman  Patient MRN: [id number]  Patient YOB: 1950 Tuesday, September 29, 2020  Mundo Parra MD  RESTRICTIONS:  During your procedure today, you received medications for sedation.  These   medications may affect your judgment, balance and coordination.  Therefore,   for 24 hours, you have the following restrictions:   - DO NOT drive a car, operate machinery, make legal/financial decisions,   sign important papers or drink alcohol.    ACTIVITY:  Today: no heavy lifting, straining or running due to procedural   sedation/anesthesia.  The following day: return to full activity including work.  DIET:  Eat and drink normally unless instructed otherwise.     TREATMENT FOR COMMON SIDE EFFECTS:  - Mild abdominal pain, nausea, belching, bloating or excessive gas:  rest,   eat lightly and use a heating pad.  - Sore Throat: treat with throat lozenges and/or gargle with warm salt   water.  - Because air was used during the procedure, expelling large amounts of air   from your rectum or belching is normal.  - If a bowel prep was taken, you may not have a bowel movement for 1-3 days.    This is normal.  SYMPTOMS TO WATCH FOR AND REPORT TO YOUR PHYSICIAN:  1. Abdominal pain or bloating, other than gas cramps.  2. Chest pain.  3. Back pain.  4. Signs of infection such as: chills or fever occurring within 24 hours   after the procedure.  5. Rectal bleeding, which would show as bright red, maroon, or black stools.   (A tablespoon of blood from the rectum is not serious, especially if   hemorrhoids are present.)  6. Vomiting.  7. Weakness or dizziness.  GO DIRECTLY TO THE NEAREST EMERGENCY ROOM IF YOU HAVE ANY OF THE FOLLOWING:      Difficulty breathing              Chills and/or fever over 101 F   Persistent vomiting and/or vomiting blood   Severe abdominal pain   Severe chest pain   Black, tarry stools   Bleeding- more than one  tablespoon   Any other symptom or condition that you feel may need urgent attention  Your doctor recommends these additional instructions:  If any biopsies were taken, your doctors clinic will contact you in 1 to 2   weeks with any results.  - Discharge patient to home.   - Await pathology results.   - The findings and recommendations were discussed with the patient.   - Patient has a contact number available for emergencies.  The signs and   symptoms of potential delayed complications were discussed with the   patient.  Return to normal activities tomorrow.  Written discharge   instructions were provided to the patient.   - Defer further screening exams given age.  - No repeat colonoscopy due to age.  For questions, problems or results please call your physician - Mundo Parra MD at Work:  [phone].  [facility], EMERGENCY ROOM PHONE NUMBER: [phone]  IF A COMPLICATION OR EMERGENCY SITUATION ARISES AND YOU ARE UNABLE TO REACH   YOUR PHYSICIAN - GO DIRECTLY TO THE EMERGENCY ROOM.  MD Mundo Walsh MD  9/29/2020 10:29:10 AM  This report has been verified and signed electronically.  [employer]

## 2020-09-29 NOTE — ANESTHESIA PREPROCEDURE EVALUATION
09/29/2020  Saritha Guzman is a 70 y.o., female.    Active Ambulatory Problems     Diagnosis Date Noted    Thyroid nodule 03/26/2013    Hyperlipidemia 03/26/2013    Osteoporosis, idiopathic 03/26/2013    Lobular carcinoma in situ of breast 06/20/2013    Breast cancer of upper-outer quadrant of left female breast 12/17/2015    Postmenopause atrophic vaginitis 02/12/2016    Arteriosclerotic coronary artery disease 12/12/2016     Resolved Ambulatory Problems     Diagnosis Date Noted    Encounter for antineoplastic chemotherapy 10/24/2013    Encounter for antineoplastic chemotherapy 11/04/2013     Past Medical History:   Diagnosis Date    Atypical ductal hyperplasia, breast 2008    Breast CA 07/2013    Fibrocystic breast     Goiter     History of endometriosis     Lobular carcinoma in situ     PONV (postoperative nausea and vomiting)          Anesthesia Evaluation    I have reviewed the Patient Summary Reports.    I have reviewed the Nursing Notes.       Review of Systems  Anesthesia Hx:  Hx of Anesthetic complications (PONV)    Social:  Non-Smoker    Hematology/Oncology:         -- Cancer in past history:   Cardiovascular:   Exercise tolerance: good Denies Hypertension. CAD (Negative stress echo 2017)  asymptomatic   Denies Angina.        Pulmonary:   Denies COPD.  Denies Asthma.  Denies Shortness of breath.  Denies Sleep Apnea.    Renal/:   Denies Chronic Renal Disease.     Hepatic/GI:   Denies GERD. Denies Liver Disease.    Neurological:   Denies CVA. Denies Seizures.    Endocrine:   Denies Diabetes. Denies Hypothyroidism.        Physical Exam  General:  Well nourished    Airway/Jaw/Neck:  Airway Findings: Mallampati: II TM Distance: Normal, at least 6 cm  Jaw/Neck Findings:  Neck ROM: Normal ROM       Chest/Lungs:  Chest/Lungs Clear    Heart/Vascular:  Heart Findings: Normal       Mental  Status:  Mental Status Findings:  Cooperative, Alert and Oriented         Anesthesia Plan  Type of Anesthesia, risks & benefits discussed:  Anesthesia Type:  general  Patient's Preference: GA  Intra-op Monitoring Plan: standard ASA monitors  Intra-op Monitoring Plan Comments:   Post Op Pain Control Plan:   Post Op Pain Control Plan Comments: Opioids PRN  Induction:    Beta Blocker:  Patient is not currently on a Beta-Blocker (No further documentation required).       Informed Consent: Patient understands risks and agrees with Anesthesia plan.  Questions answered. Anesthesia consent signed with patient.  ASA Score: 2     Day of Surgery Review of History & Physical:        Anesthesia Plan Notes: I personally saw the patient and a history and physical was performed.    Plan for GA - TIVA        Ready For Surgery From Anesthesia Perspective.

## 2020-09-29 NOTE — H&P
Short Stay Endoscopy History and Physical    PCP - Marlon Richardson MD  Referring Physician - Marlon Richardson MD  [address] [place] [address]    Procedure - Colonoscopy  ASA - per anesthesia  Mallampati - per anesthesia  History of Anesthesia problems - no  Family history Anesthesia problems -  no   Plan of anesthesia - General    HPI  70 y.o. female  Reason for procedure:   Constipation, unspecified constipation type [K59.00]  Abdominal pain, unspecified abdominal location [R10.9]        ROS:  Constitutional: No fevers, chills, No weight loss  CV: No chest pain  Pulm: No cough, No shortness of breath  GI: see HPI    Medical History:  has a past medical history of Atypical ductal hyperplasia, breast (2008), Breast CA (07/2013), Fibrocystic breast, Goiter, History of endometriosis, Hyperlipidemia, Lobular carcinoma in situ, and PONV (postoperative nausea and vomiting).    Surgical History:  has a past surgical history that includes Hysterectomy; tonsillectomy; Appendectomy; Facelift; Sinus surgery; Breast cyst aspiration; Breast lumpectomy (Left, 07/2013); Mastectomy (Left, 2013); Breast biopsy (Left, 06/2013); Breast biopsy (Left, 07/2013); Breast biopsy (Left, 2008); Breast biopsy (Left, 2008); and Breast reconstruction.    Family History: family history includes Cancer in her maternal grandfather and maternal grandmother; Diabetes in her father; No Known Problems in her brother, maternal uncle, paternal aunt, paternal grandfather, paternal grandmother, paternal uncle, and sister; Stroke in her maternal aunt, maternal grandmother, and mother..    Social History:  reports that she has never smoked. She has never used smokeless tobacco. She reports current alcohol use of about 2.0 standard drinks of alcohol per week. She reports that she does not use drugs.    Review of patient's allergies indicates:   Allergen Reactions    Codeine     Corticosteroids (glucocorticoids)      Oral version  puts in manic state    Penicillins     Tetracyclines Nausea And Vomiting       Medications:   Medications Prior to Admission   Medication Sig Dispense Refill Last Dose    ascorbic acid (VITAMIN C) 1000 MG tablet Take 1,000 mg by mouth once daily.   9/28/2020 at Unknown time    atorvastatin (LIPITOR) 20 MG tablet TAKE 1 TABLET(20 MG) BY MOUTH EVERY DAY 90 tablet 3 9/28/2020 at Unknown time    calcium-vitamin D3 (CALCIUM 500 + D) 500 mg(1,250mg) -200 unit per tablet Take 1 tablet by mouth 2 (two) times daily with meals.   9/28/2020 at Unknown time    cholecalciferol, vitamin D3, (VITAMIN D3) 1,000 unit capsule Take 2,000 Units by mouth once daily.   9/28/2020 at Unknown time    coenzyme Q10 (CO Q-10) 200 mg capsule Take 200 mg by mouth once daily.   9/28/2020 at Unknown time    cyanocobalamin (VITAMIN B-12) 100 MCG tablet Take 100 mcg by mouth once daily.   9/28/2020 at Unknown time    DOCOSAHEXANOIC ACID/EPA (FISH OIL ORAL) Take by mouth.   9/28/2020 at Unknown time    ezetimibe (ZETIA) 10 mg tablet Take 1 tablet (10 mg total) by mouth once daily. 30 tablet 3 9/28/2020 at Unknown time    letrozole (FEMARA) 2.5 mg Tab TAKE 1 TABLET BY MOUTH DAILY 90 tablet 3 9/28/2020 at Unknown time    LORazepam (ATIVAN) 1 MG tablet Take 1 tablet (1 mg total) by mouth every 12 (twelve) hours as needed. 30 tablet 2 Past Month at Unknown time    MULTIVITAMIN ORAL Take by mouth.   9/28/2020 at Unknown time    ALPRAZolam (XANAX) 0.25 MG tablet Take 1 tablet (0.25 mg total) by mouth daily as needed for Anxiety. 15 tablet 0 More than a month at Unknown time    FLUAD QUAD 2020-21,65Y UP,,PF, 60 mcg (15 mcg x 4)/0.5 mL Syrg ADM 0.5ML IM UTD       INTRAROSA 6.5 mg Inst INSERT 1 INSERT VAGINALLY 3 TIMES A WEEK 30 each 3     neomycin-polymyxin-dexamethasone (DEXACINE) 3.5 mg/g-10,000 unit/g-0.1 % Oint Place into the right eye every 6 (six) hours. 3.5 g 3     ondansetron (ZOFRAN) 4 MG tablet Take 1 tablet (4 mg total) by mouth  every 8 (eight) hours as needed for Nausea. 12 tablet 3 More than a month at Unknown time    sodium,potassium,mag sulfates (SUPREP BOWEL PREP KIT) 17.5-3.13-1.6 gram SolR Please dispense as directed/ 1 kit 1 kit 0        Physical Exam:    Vital Signs:   Vitals:    09/29/20 0946   BP: (!) 146/78   Pulse: 103   Resp: 16   Temp: 98.1 °F (36.7 °C)       General Appearance: Well appearing in no acute distress  Abdomen: Soft, non tender, non distended with normal bowel sounds, no masses    Labs:  Lab Results   Component Value Date    WBC 4.98 09/17/2020    HGB 12.5 09/17/2020    HCT 39.2 09/17/2020     09/17/2020    CHOL 194 09/17/2020    TRIG 99 09/17/2020    HDL 56 09/17/2020    ALT 30 09/17/2020    AST 21 09/17/2020     09/17/2020    K 4.3 09/17/2020     09/17/2020    CREATININE 1.0 09/17/2020    BUN 15 09/17/2020    CO2 25 09/17/2020    TSH 1.494 09/17/2020    INR 0.9 03/24/2004       I have explained the risks and benefits of this endoscopic procedure to the patient including but not limited to bleeding, inflammation, infection, perforation, and death.      Mundo Parra MD

## 2020-09-29 NOTE — DISCHARGE INSTRUCTIONS
Colonoscopy     A camera attached to a flexible tube with a viewing lens is used to take video pictures.     Colonoscopy is a test to view the inside of your lower digestive tract (colon and rectum). Sometimes it can show the last part of the small intestine (ileum). During the test, small pieces of tissue may be removed for testing. This is called a biopsy. Small growths, such as polyps, may also be removed.   Why is colonoscopy done?  The test is done to help look for colon cancer. And it can help find the source of abdominal pain, bleeding, and changes in bowel habits. It may be needed once a year, depending on factors such as your:  · Age  · Health history  · Family health history  · Symptoms  · Results from any prior colonoscopy  Risks and possible complications  These include:  · Bleeding               · A puncture or tear in the colon   · Risks of anesthesia  · A cancer lesion not being seen  Getting ready   To prepare for the test:  · Talk with your healthcare provider about the risks of the test (see below). Also ask your healthcare provider about alternatives to the test.  · Tell your healthcare provider about any medicines you take. Also tell him or her about any health conditions you may have.  · Make sure your rectum and colon are empty for the test. Follow the diet and bowel prep instructions exactly. If you dont, the test may need to be rescheduled.  · Plan for a friend or family member to drive you home after the test.     Colonoscopy provides an inside view of the entire colon.     You may discuss the results with your doctor right away or at a future visit.  During the test   The test is usually done in the hospital on an outpatient basis. This means you go home the same day. The procedure takes about 30 minutes. During that time:  · You are given relaxing (sedating) medicine through an IV line. You may be drowsy, or fully asleep.  · The healthcare provider will first give you a physical exam to  check for anal and rectal problems.  · Then the anus is lubricated and the scope inserted.  · If you are awake, you may have a feeling similar to needing to have a bowel movement. You may also feel pressure as air is pumped into the colon. Its OK to pass gas during the procedure.  · Biopsy, polyp removal, or other treatments may be done during the test.  After the test   You may have gas right after the test. It can help to try to pass it to help prevent later bloating. Your healthcare provider may discuss the results with you right away. Or you may need to schedule a follow-up visit to talk about the results. After the test, you can go back to your normal eating and other activities. You may be tired from the sedation and need to rest for a few hours.  Date Last Reviewed: 11/1/2016 © 2000-2017 [employer]. [address] [place] [address], [place] [address]. All rights reserved. This information is not intended as a substitute for professional medical care. Always follow your healthcare professional's instructions.

## 2020-10-01 LAB
FINAL PATHOLOGIC DIAGNOSIS: NORMAL
GROSS: NORMAL

## 2020-11-11 ENCOUNTER — PATIENT MESSAGE (OUTPATIENT)
Dept: INTERNAL MEDICINE | Facility: CLINIC | Age: 70
End: 2020-11-11

## 2020-11-11 ENCOUNTER — LAB VISIT (OUTPATIENT)
Dept: LAB | Facility: HOSPITAL | Age: 70
End: 2020-11-11
Payer: MEDICARE

## 2020-11-11 DIAGNOSIS — E78.5 HYPERLIPIDEMIA, UNSPECIFIED HYPERLIPIDEMIA TYPE: ICD-10-CM

## 2020-11-11 LAB
CHOLEST SERPL-MCNC: 137 MG/DL (ref 120–199)
CHOLEST/HDLC SERPL: 2.5 {RATIO} (ref 2–5)
HDLC SERPL-MCNC: 55 MG/DL (ref 40–75)
HDLC SERPL: 40.1 % (ref 20–50)
LDLC SERPL CALC-MCNC: 66.2 MG/DL (ref 63–159)
NONHDLC SERPL-MCNC: 82 MG/DL
TRIGL SERPL-MCNC: 79 MG/DL (ref 30–150)

## 2020-11-11 PROCEDURE — 80061 LIPID PANEL: CPT

## 2020-11-11 PROCEDURE — 36415 COLL VENOUS BLD VENIPUNCTURE: CPT

## 2021-01-05 ENCOUNTER — IMMUNIZATION (OUTPATIENT)
Dept: INTERNAL MEDICINE | Facility: CLINIC | Age: 71
End: 2021-01-05
Payer: MEDICARE

## 2021-01-05 DIAGNOSIS — Z23 NEED FOR VACCINATION: ICD-10-CM

## 2021-01-05 PROCEDURE — 91300 COVID-19, MRNA, LNP-S, PF, 30 MCG/0.3 ML DOSE VACCINE: CPT | Mod: PBBFAC

## 2021-01-19 ENCOUNTER — PATIENT MESSAGE (OUTPATIENT)
Dept: INTERNAL MEDICINE | Facility: CLINIC | Age: 71
End: 2021-01-19

## 2021-01-19 RX ORDER — EZETIMIBE 10 MG/1
10 TABLET ORAL DAILY
Qty: 90 TABLET | Refills: 3 | Status: SHIPPED | OUTPATIENT
Start: 2021-01-19 | End: 2021-06-15 | Stop reason: SDUPTHER

## 2021-01-20 ENCOUNTER — HOSPITAL ENCOUNTER (OUTPATIENT)
Dept: RADIOLOGY | Facility: HOSPITAL | Age: 71
Discharge: HOME | End: 2021-01-20
Attending: NURSE PRACTITIONER
Payer: MEDICARE

## 2021-01-20 DIAGNOSIS — C50.412 MALIGNANT NEOPLASM OF UPPER-OUTER QUADRANT OF LEFT BREAST IN FEMALE, ESTROGEN RECEPTOR POSITIVE: ICD-10-CM

## 2021-01-20 DIAGNOSIS — Z17.0 MALIGNANT NEOPLASM OF UPPER-OUTER QUADRANT OF LEFT BREAST IN FEMALE, ESTROGEN RECEPTOR POSITIVE: ICD-10-CM

## 2021-01-20 LAB
CREAT SERPL-MCNC: 0.9 MG/DL (ref 0.5–1.4)
SAMPLE: NORMAL

## 2021-01-20 PROCEDURE — A9577 INJ MULTIHANCE: HCPCS | Performed by: NURSE PRACTITIONER

## 2021-01-20 PROCEDURE — 25500020 PHARM REV CODE 255: Performed by: NURSE PRACTITIONER

## 2021-01-20 PROCEDURE — 77049 MRI BREAST C-+ W/CAD BI: CPT | Mod: 26,,, | Performed by: RADIOLOGY

## 2021-01-20 PROCEDURE — 77049 MRI BREAST W/WO CONTRAST, W/CAD, BILATERAL: ICD-10-PCS | Mod: 26,,, | Performed by: RADIOLOGY

## 2021-01-20 PROCEDURE — C8937 CAD BREAST MRI: HCPCS | Mod: TC

## 2021-01-20 RX ADMIN — GADOBENATE DIMEGLUMINE 10 ML: 529 INJECTION, SOLUTION INTRAVENOUS at 10:01

## 2021-01-22 ENCOUNTER — OFFICE VISIT (OUTPATIENT)
Dept: HEMATOLOGY/ONCOLOGY | Facility: CLINIC | Age: 71
End: 2021-01-22
Payer: MEDICARE

## 2021-01-22 VITALS
SYSTOLIC BLOOD PRESSURE: 132 MMHG | TEMPERATURE: 98 F | OXYGEN SATURATION: 100 % | HEIGHT: 64 IN | BODY MASS INDEX: 18.56 KG/M2 | RESPIRATION RATE: 16 BRPM | WEIGHT: 108.69 LBS | DIASTOLIC BLOOD PRESSURE: 60 MMHG | HEART RATE: 86 BPM

## 2021-01-22 DIAGNOSIS — Z17.0 MALIGNANT NEOPLASM OF UPPER-OUTER QUADRANT OF LEFT BREAST IN FEMALE, ESTROGEN RECEPTOR POSITIVE: Primary | ICD-10-CM

## 2021-01-22 DIAGNOSIS — C50.412 MALIGNANT NEOPLASM OF UPPER-OUTER QUADRANT OF LEFT BREAST IN FEMALE, ESTROGEN RECEPTOR POSITIVE: Primary | ICD-10-CM

## 2021-01-22 DIAGNOSIS — E78.5 HYPERLIPIDEMIA, UNSPECIFIED HYPERLIPIDEMIA TYPE: ICD-10-CM

## 2021-01-22 DIAGNOSIS — M81.8 OSTEOPOROSIS, IDIOPATHIC: ICD-10-CM

## 2021-01-22 PROCEDURE — 99999 PR PBB SHADOW E&M-EST. PATIENT-LVL V: CPT | Mod: PBBFAC,,, | Performed by: NURSE PRACTITIONER

## 2021-01-22 PROCEDURE — 99214 OFFICE O/P EST MOD 30 MIN: CPT | Mod: S$PBB,,, | Performed by: NURSE PRACTITIONER

## 2021-01-22 PROCEDURE — 99215 OFFICE O/P EST HI 40 MIN: CPT | Mod: PBBFAC | Performed by: NURSE PRACTITIONER

## 2021-01-22 PROCEDURE — 99999 PR PBB SHADOW E&M-EST. PATIENT-LVL V: ICD-10-PCS | Mod: PBBFAC,,, | Performed by: NURSE PRACTITIONER

## 2021-01-22 PROCEDURE — 99214 PR OFFICE/OUTPT VISIT, EST, LEVL IV, 30-39 MIN: ICD-10-PCS | Mod: S$PBB,,, | Performed by: NURSE PRACTITIONER

## 2021-01-25 ENCOUNTER — PATIENT MESSAGE (OUTPATIENT)
Dept: HEMATOLOGY/ONCOLOGY | Facility: CLINIC | Age: 71
End: 2021-01-25

## 2021-01-26 ENCOUNTER — IMMUNIZATION (OUTPATIENT)
Dept: INTERNAL MEDICINE | Facility: CLINIC | Age: 71
End: 2021-01-26
Payer: MEDICARE

## 2021-01-26 DIAGNOSIS — Z23 NEED FOR VACCINATION: Primary | ICD-10-CM

## 2021-01-26 PROCEDURE — 0002A COVID-19, MRNA, LNP-S, PF, 30 MCG/0.3 ML DOSE VACCINE: CPT | Mod: PBBFAC

## 2021-01-26 PROCEDURE — 91300 COVID-19, MRNA, LNP-S, PF, 30 MCG/0.3 ML DOSE VACCINE: CPT | Mod: PBBFAC

## 2021-02-01 ENCOUNTER — TELEPHONE (OUTPATIENT)
Dept: INTERNAL MEDICINE | Facility: CLINIC | Age: 71
End: 2021-02-01

## 2021-02-01 DIAGNOSIS — I25.10 ARTERIOSCLEROTIC CORONARY ARTERY DISEASE: Primary | ICD-10-CM

## 2021-02-02 ENCOUNTER — LAB VISIT (OUTPATIENT)
Dept: LAB | Facility: HOSPITAL | Age: 71
End: 2021-02-02
Payer: MEDICARE

## 2021-02-02 ENCOUNTER — OFFICE VISIT (OUTPATIENT)
Dept: INTERNAL MEDICINE | Facility: CLINIC | Age: 71
End: 2021-02-02
Payer: MEDICARE

## 2021-02-02 ENCOUNTER — PATIENT MESSAGE (OUTPATIENT)
Dept: INTERNAL MEDICINE | Facility: CLINIC | Age: 71
End: 2021-02-02

## 2021-02-02 VITALS
TEMPERATURE: 97 F | HEART RATE: 85 BPM | WEIGHT: 109.88 LBS | SYSTOLIC BLOOD PRESSURE: 117 MMHG | BODY MASS INDEX: 18.86 KG/M2 | DIASTOLIC BLOOD PRESSURE: 71 MMHG

## 2021-02-02 DIAGNOSIS — E78.00 PURE HYPERCHOLESTEROLEMIA: ICD-10-CM

## 2021-02-02 DIAGNOSIS — I25.10 ARTERIOSCLEROTIC CORONARY ARTERY DISEASE: Primary | ICD-10-CM

## 2021-02-02 DIAGNOSIS — I25.10 ARTERIOSCLEROTIC CORONARY ARTERY DISEASE: ICD-10-CM

## 2021-02-02 LAB
ALT SERPL W/O P-5'-P-CCNC: 58 U/L (ref 10–44)
CHOLEST SERPL-MCNC: 140 MG/DL (ref 120–199)
CHOLEST/HDLC SERPL: 2.7 {RATIO} (ref 2–5)
CK SERPL-CCNC: 56 U/L (ref 20–180)
HDLC SERPL-MCNC: 51 MG/DL (ref 40–75)
HDLC SERPL: 36.4 % (ref 20–50)
LDLC SERPL CALC-MCNC: 68.4 MG/DL (ref 63–159)
NONHDLC SERPL-MCNC: 89 MG/DL
TRIGL SERPL-MCNC: 103 MG/DL (ref 30–150)

## 2021-02-02 PROCEDURE — 80061 LIPID PANEL: CPT

## 2021-02-02 PROCEDURE — 36415 COLL VENOUS BLD VENIPUNCTURE: CPT

## 2021-02-02 PROCEDURE — 99214 OFFICE O/P EST MOD 30 MIN: CPT | Mod: PBBFAC | Performed by: INTERNAL MEDICINE

## 2021-02-02 PROCEDURE — 82550 ASSAY OF CK (CPK): CPT

## 2021-02-02 PROCEDURE — 99213 PR OFFICE/OUTPT VISIT, EST, LEVL III, 20-29 MIN: ICD-10-PCS | Mod: S$PBB,,, | Performed by: INTERNAL MEDICINE

## 2021-02-02 PROCEDURE — 99999 PR PBB SHADOW E&M-EST. PATIENT-LVL IV: ICD-10-PCS | Mod: PBBFAC,,, | Performed by: INTERNAL MEDICINE

## 2021-02-02 PROCEDURE — 99213 OFFICE O/P EST LOW 20 MIN: CPT | Mod: S$PBB,,, | Performed by: INTERNAL MEDICINE

## 2021-02-02 PROCEDURE — 99999 PR PBB SHADOW E&M-EST. PATIENT-LVL IV: CPT | Mod: PBBFAC,,, | Performed by: INTERNAL MEDICINE

## 2021-02-02 PROCEDURE — 84460 ALANINE AMINO (ALT) (SGPT): CPT

## 2021-02-02 RX ORDER — BIOTIN 1000 MCG
TABLET,CHEWABLE ORAL
COMMUNITY

## 2021-03-18 ENCOUNTER — PATIENT MESSAGE (OUTPATIENT)
Dept: RESEARCH | Facility: HOSPITAL | Age: 71
End: 2021-03-18

## 2021-03-26 ENCOUNTER — PATIENT MESSAGE (OUTPATIENT)
Dept: RESEARCH | Facility: HOSPITAL | Age: 71
End: 2021-03-26

## 2021-03-29 RX ORDER — ATORVASTATIN CALCIUM 20 MG/1
20 TABLET, FILM COATED ORAL DAILY
Qty: 90 TABLET | Refills: 3 | Status: SHIPPED | OUTPATIENT
Start: 2021-03-29 | End: 2021-06-14 | Stop reason: SDUPTHER

## 2021-04-18 ENCOUNTER — PATIENT MESSAGE (OUTPATIENT)
Dept: OBSTETRICS AND GYNECOLOGY | Facility: CLINIC | Age: 71
End: 2021-04-18

## 2021-05-06 ENCOUNTER — PATIENT MESSAGE (OUTPATIENT)
Dept: OBSTETRICS AND GYNECOLOGY | Facility: CLINIC | Age: 71
End: 2021-05-06

## 2021-05-11 ENCOUNTER — PATIENT MESSAGE (OUTPATIENT)
Dept: OBSTETRICS AND GYNECOLOGY | Facility: CLINIC | Age: 71
End: 2021-05-11

## 2021-06-09 ENCOUNTER — PATIENT MESSAGE (OUTPATIENT)
Dept: RESEARCH | Facility: HOSPITAL | Age: 71
End: 2021-06-09

## 2021-06-09 ENCOUNTER — TELEPHONE (OUTPATIENT)
Dept: OBSTETRICS AND GYNECOLOGY | Facility: CLINIC | Age: 71
End: 2021-06-09

## 2021-06-09 ENCOUNTER — PATIENT OUTREACH (OUTPATIENT)
Dept: ADMINISTRATIVE | Facility: OTHER | Age: 71
End: 2021-06-09

## 2021-06-10 ENCOUNTER — OFFICE VISIT (OUTPATIENT)
Dept: OBSTETRICS AND GYNECOLOGY | Facility: CLINIC | Age: 71
End: 2021-06-10
Attending: OBSTETRICS & GYNECOLOGY
Payer: MEDICARE

## 2021-06-10 ENCOUNTER — TELEPHONE (OUTPATIENT)
Dept: HEMATOLOGY/ONCOLOGY | Facility: CLINIC | Age: 71
End: 2021-06-10

## 2021-06-10 VITALS
HEIGHT: 64 IN | SYSTOLIC BLOOD PRESSURE: 116 MMHG | DIASTOLIC BLOOD PRESSURE: 68 MMHG | BODY MASS INDEX: 18.56 KG/M2 | WEIGHT: 108.69 LBS

## 2021-06-10 DIAGNOSIS — Z85.3 HISTORY OF BREAST CANCER: ICD-10-CM

## 2021-06-10 DIAGNOSIS — Z01.419 ENCOUNTER FOR GYNECOLOGICAL EXAMINATION WITHOUT ABNORMAL FINDING: ICD-10-CM

## 2021-06-10 DIAGNOSIS — N39.41 URGE INCONTINENCE: ICD-10-CM

## 2021-06-10 DIAGNOSIS — R35.0 URINARY FREQUENCY: Primary | ICD-10-CM

## 2021-06-10 PROCEDURE — G0101 CA SCREEN;PELVIC/BREAST EXAM: HCPCS | Mod: S$GLB,,, | Performed by: OBSTETRICS & GYNECOLOGY

## 2021-06-10 PROCEDURE — G0101 PR CA SCREEN;PELVIC/BREAST EXAM: ICD-10-PCS | Mod: S$GLB,,, | Performed by: OBSTETRICS & GYNECOLOGY

## 2021-06-14 RX ORDER — ATORVASTATIN CALCIUM 20 MG/1
20 TABLET, FILM COATED ORAL DAILY
Qty: 90 TABLET | Refills: 3 | Status: SHIPPED | OUTPATIENT
Start: 2021-06-14 | End: 2022-05-23 | Stop reason: SDUPTHER

## 2021-06-15 ENCOUNTER — OFFICE VISIT (OUTPATIENT)
Dept: INTERNAL MEDICINE | Facility: CLINIC | Age: 71
End: 2021-06-15
Payer: MEDICARE

## 2021-06-15 ENCOUNTER — TELEPHONE (OUTPATIENT)
Dept: ADMINISTRATIVE | Facility: OTHER | Age: 71
End: 2021-06-15

## 2021-06-15 DIAGNOSIS — K57.92 ACUTE DIVERTICULITIS: Primary | ICD-10-CM

## 2021-06-15 PROCEDURE — 99213 PR OFFICE/OUTPT VISIT, EST, LEVL III, 20-29 MIN: ICD-10-PCS | Mod: S$PBB,,, | Performed by: INTERNAL MEDICINE

## 2021-06-15 PROCEDURE — 99213 OFFICE O/P EST LOW 20 MIN: CPT | Mod: S$PBB,,, | Performed by: INTERNAL MEDICINE

## 2021-06-15 RX ORDER — EZETIMIBE 10 MG/1
10 TABLET ORAL DAILY
Qty: 90 TABLET | Refills: 3 | Status: SHIPPED | OUTPATIENT
Start: 2021-06-15 | End: 2022-08-06 | Stop reason: ALTCHOICE

## 2021-06-15 RX ORDER — METRONIDAZOLE 500 MG/1
500 TABLET ORAL EVERY 12 HOURS
Qty: 14 TABLET | Refills: 0 | Status: SHIPPED | OUTPATIENT
Start: 2021-06-15 | End: 2021-10-12

## 2021-06-15 RX ORDER — EZETIMIBE 10 MG/1
10 TABLET ORAL DAILY
Qty: 90 TABLET | Refills: 3 | Status: SHIPPED | OUTPATIENT
Start: 2021-06-15 | End: 2021-06-15 | Stop reason: SDUPTHER

## 2021-06-15 RX ORDER — CIPROFLOXACIN 500 MG/1
500 TABLET ORAL 2 TIMES DAILY
Qty: 14 TABLET | Refills: 0 | Status: SHIPPED | OUTPATIENT
Start: 2021-06-15 | End: 2021-10-12

## 2021-06-16 ENCOUNTER — TELEPHONE (OUTPATIENT)
Dept: ADMINISTRATIVE | Facility: OTHER | Age: 71
End: 2021-06-16

## 2021-06-22 ENCOUNTER — PATIENT MESSAGE (OUTPATIENT)
Dept: HEMATOLOGY/ONCOLOGY | Facility: CLINIC | Age: 71
End: 2021-06-22

## 2021-06-22 ENCOUNTER — PATIENT MESSAGE (OUTPATIENT)
Dept: OBSTETRICS AND GYNECOLOGY | Facility: CLINIC | Age: 71
End: 2021-06-22

## 2021-06-23 ENCOUNTER — OFFICE VISIT (OUTPATIENT)
Dept: HEMATOLOGY/ONCOLOGY | Facility: CLINIC | Age: 71
End: 2021-06-23
Payer: MEDICARE

## 2021-06-23 ENCOUNTER — TELEPHONE (OUTPATIENT)
Dept: RADIOLOGY | Facility: HOSPITAL | Age: 71
End: 2021-06-23

## 2021-06-23 ENCOUNTER — HOSPITAL ENCOUNTER (OUTPATIENT)
Dept: RADIOLOGY | Facility: HOSPITAL | Age: 71
Discharge: HOME | End: 2021-06-23
Attending: NURSE PRACTITIONER
Payer: MEDICARE

## 2021-06-23 VITALS
HEIGHT: 64 IN | HEART RATE: 94 BPM | BODY MASS INDEX: 18.29 KG/M2 | DIASTOLIC BLOOD PRESSURE: 75 MMHG | SYSTOLIC BLOOD PRESSURE: 127 MMHG | WEIGHT: 107.13 LBS | RESPIRATION RATE: 20 BRPM

## 2021-06-23 DIAGNOSIS — C50.412 MALIGNANT NEOPLASM OF UPPER-OUTER QUADRANT OF LEFT BREAST IN FEMALE, ESTROGEN RECEPTOR POSITIVE: ICD-10-CM

## 2021-06-23 DIAGNOSIS — N63.20 LEFT BREAST MASS: Primary | ICD-10-CM

## 2021-06-23 DIAGNOSIS — N63.42 UNSPECIFIED LUMP IN LEFT BREAST, SUBAREOLAR: ICD-10-CM

## 2021-06-23 DIAGNOSIS — N63.20 LEFT BREAST MASS: ICD-10-CM

## 2021-06-23 DIAGNOSIS — Z17.0 MALIGNANT NEOPLASM OF UPPER-OUTER QUADRANT OF LEFT BREAST IN FEMALE, ESTROGEN RECEPTOR POSITIVE: ICD-10-CM

## 2021-06-23 PROCEDURE — 77061 BREAST TOMOSYNTHESIS UNI: CPT | Mod: TC,LT

## 2021-06-23 PROCEDURE — 76642 ULTRASOUND BREAST LIMITED: CPT | Mod: TC,LT

## 2021-06-23 PROCEDURE — 76642 US BREAST LEFT LIMITED: ICD-10-PCS | Mod: 26,LT,, | Performed by: RADIOLOGY

## 2021-06-23 PROCEDURE — 76642 ULTRASOUND BREAST LIMITED: CPT | Mod: 26,LT,, | Performed by: RADIOLOGY

## 2021-06-23 PROCEDURE — 99214 PR OFFICE/OUTPT VISIT, EST, LEVL IV, 30-39 MIN: ICD-10-PCS | Mod: S$PBB,,, | Performed by: NURSE PRACTITIONER

## 2021-06-23 PROCEDURE — 99999 PR PBB SHADOW E&M-EST. PATIENT-LVL IV: ICD-10-PCS | Mod: PBBFAC,,, | Performed by: NURSE PRACTITIONER

## 2021-06-23 PROCEDURE — 77065 DX MAMMO INCL CAD UNI: CPT | Mod: 26,LT,, | Performed by: RADIOLOGY

## 2021-06-23 PROCEDURE — 99214 OFFICE O/P EST MOD 30 MIN: CPT | Mod: S$PBB,,, | Performed by: NURSE PRACTITIONER

## 2021-06-23 PROCEDURE — 77065 MAMMO DIGITAL DIAGNOSTIC LEFT WITH TOMO: ICD-10-PCS | Mod: 26,LT,, | Performed by: RADIOLOGY

## 2021-06-23 PROCEDURE — 99214 OFFICE O/P EST MOD 30 MIN: CPT | Mod: PBBFAC,25 | Performed by: NURSE PRACTITIONER

## 2021-06-23 PROCEDURE — 99999 PR PBB SHADOW E&M-EST. PATIENT-LVL IV: CPT | Mod: PBBFAC,,, | Performed by: NURSE PRACTITIONER

## 2021-06-23 PROCEDURE — 77061 BREAST TOMOSYNTHESIS UNI: CPT | Mod: 26,LT,, | Performed by: RADIOLOGY

## 2021-06-23 PROCEDURE — 77061 MAMMO DIGITAL DIAGNOSTIC LEFT WITH TOMO: ICD-10-PCS | Mod: 26,LT,, | Performed by: RADIOLOGY

## 2021-06-26 ENCOUNTER — PATIENT MESSAGE (OUTPATIENT)
Dept: INTERNAL MEDICINE | Facility: CLINIC | Age: 71
End: 2021-06-26

## 2021-06-29 ENCOUNTER — HOSPITAL ENCOUNTER (OUTPATIENT)
Dept: RADIOLOGY | Facility: HOSPITAL | Age: 71
Discharge: HOME | End: 2021-06-29
Attending: NURSE PRACTITIONER
Payer: MEDICARE

## 2021-06-29 DIAGNOSIS — N63.20 LEFT BREAST MASS: Primary | ICD-10-CM

## 2021-06-29 DIAGNOSIS — R92.8 ABNORMAL MAMMOGRAM: ICD-10-CM

## 2021-06-29 PROCEDURE — 88305 TISSUE EXAM BY PATHOLOGIST: CPT | Performed by: PATHOLOGY

## 2021-06-29 PROCEDURE — 25000003 PHARM REV CODE 250: Performed by: NURSE PRACTITIONER

## 2021-06-29 PROCEDURE — 88305 TISSUE EXAM BY PATHOLOGIST: ICD-10-PCS | Mod: 26,,, | Performed by: PATHOLOGY

## 2021-06-29 PROCEDURE — 19083 BX BREAST 1ST LESION US IMAG: CPT | Mod: LT,,, | Performed by: RADIOLOGY

## 2021-06-29 PROCEDURE — 88305 TISSUE EXAM BY PATHOLOGIST: CPT | Mod: 26,,, | Performed by: PATHOLOGY

## 2021-06-29 PROCEDURE — 77065 MAMMO DIGITAL DIAGNOSTIC LEFT: ICD-10-PCS | Mod: 26,LT,, | Performed by: RADIOLOGY

## 2021-06-29 PROCEDURE — 27201068 US BREAST BIOPSY WITH IMAGING 1ST SITE LEFT

## 2021-06-29 PROCEDURE — 19083 US BREAST BIOPSY WITH IMAGING 1ST SITE LEFT: ICD-10-PCS | Mod: LT,,, | Performed by: RADIOLOGY

## 2021-06-29 PROCEDURE — 77065 DX MAMMO INCL CAD UNI: CPT | Mod: TC,LT

## 2021-06-29 PROCEDURE — 77065 DX MAMMO INCL CAD UNI: CPT | Mod: 26,LT,, | Performed by: RADIOLOGY

## 2021-06-29 RX ORDER — LIDOCAINE HYDROCHLORIDE 10 MG/ML
3 INJECTION INFILTRATION; PERINEURAL ONCE
Status: COMPLETED | OUTPATIENT
Start: 2021-06-29 | End: 2021-06-29

## 2021-06-29 RX ORDER — LIDOCAINE HYDROCHLORIDE AND EPINEPHRINE 20; 10 MG/ML; UG/ML
10 INJECTION, SOLUTION INFILTRATION; PERINEURAL ONCE
Status: COMPLETED | OUTPATIENT
Start: 2021-06-29 | End: 2021-06-29

## 2021-06-29 RX ADMIN — LIDOCAINE HYDROCHLORIDE AND EPINEPHRINE 10 ML: 20; 10 INJECTION, SOLUTION INFILTRATION; PERINEURAL at 09:06

## 2021-06-29 RX ADMIN — LIDOCAINE HYDROCHLORIDE 3 ML: 10 INJECTION, SOLUTION EPIDURAL; INFILTRATION; INTRACAUDAL; PERINEURAL at 09:06

## 2021-06-30 LAB
FINAL PATHOLOGIC DIAGNOSIS: NORMAL
GROSS: NORMAL
Lab: NORMAL

## 2021-07-01 ENCOUNTER — TELEPHONE (OUTPATIENT)
Dept: SURGERY | Facility: CLINIC | Age: 71
End: 2021-07-01

## 2021-07-02 ENCOUNTER — TELEPHONE (OUTPATIENT)
Dept: SURGERY | Facility: CLINIC | Age: 71
End: 2021-07-02

## 2021-07-02 DIAGNOSIS — Z12.31 VISIT FOR SCREENING MAMMOGRAM: Primary | ICD-10-CM

## 2021-07-28 ENCOUNTER — HOSPITAL ENCOUNTER (OUTPATIENT)
Dept: RADIOLOGY | Facility: HOSPITAL | Age: 71
Discharge: HOME | End: 2021-07-28
Attending: NURSE PRACTITIONER
Payer: MEDICARE

## 2021-07-28 DIAGNOSIS — Z12.31 VISIT FOR SCREENING MAMMOGRAM: ICD-10-CM

## 2021-07-28 PROCEDURE — 77063 BREAST TOMOSYNTHESIS BI: CPT | Mod: 26,,, | Performed by: RADIOLOGY

## 2021-07-28 PROCEDURE — 77067 SCR MAMMO BI INCL CAD: CPT | Mod: TC

## 2021-07-28 PROCEDURE — 77067 MAMMO DIGITAL SCREENING RIGHT WITH TOMO: ICD-10-PCS | Mod: 26,,, | Performed by: RADIOLOGY

## 2021-07-28 PROCEDURE — 77067 SCR MAMMO BI INCL CAD: CPT | Mod: 26,,, | Performed by: RADIOLOGY

## 2021-07-28 PROCEDURE — 77063 MAMMO DIGITAL SCREENING RIGHT WITH TOMO: ICD-10-PCS | Mod: 26,,, | Performed by: RADIOLOGY

## 2021-07-29 ENCOUNTER — PATIENT MESSAGE (OUTPATIENT)
Dept: OBSTETRICS AND GYNECOLOGY | Facility: CLINIC | Age: 71
End: 2021-07-29

## 2021-07-30 ENCOUNTER — PATIENT MESSAGE (OUTPATIENT)
Dept: HEMATOLOGY/ONCOLOGY | Facility: CLINIC | Age: 71
End: 2021-07-30

## 2021-07-30 DIAGNOSIS — N95.2 VAGINAL ATROPHY: ICD-10-CM

## 2021-07-30 RX ORDER — PRASTERONE 6.5 MG/1
INSERT VAGINAL
Qty: 28 EACH | Refills: 3 | OUTPATIENT
Start: 2021-07-30 | End: 2022-02-18

## 2021-08-02 ENCOUNTER — TELEPHONE (OUTPATIENT)
Dept: RADIOLOGY | Facility: HOSPITAL | Age: 71
End: 2021-08-02

## 2021-08-03 ENCOUNTER — HOSPITAL ENCOUNTER (OUTPATIENT)
Dept: RADIOLOGY | Facility: HOSPITAL | Age: 71
Discharge: HOME | End: 2021-08-03
Attending: NURSE PRACTITIONER
Payer: MEDICARE

## 2021-08-03 DIAGNOSIS — R92.8 ABNORMAL MAMMOGRAM: ICD-10-CM

## 2021-08-03 PROCEDURE — 77065 DX MAMMO INCL CAD UNI: CPT | Mod: 26,RT,, | Performed by: RADIOLOGY

## 2021-08-03 PROCEDURE — 77061 MAMMO DIGITAL DIAGNOSTIC RIGHT WITH TOMO: ICD-10-PCS | Mod: 26,RT,, | Performed by: RADIOLOGY

## 2021-08-03 PROCEDURE — 77061 BREAST TOMOSYNTHESIS UNI: CPT | Mod: TC,RT

## 2021-08-03 PROCEDURE — 76642 ULTRASOUND BREAST LIMITED: CPT | Mod: TC,RT

## 2021-08-03 PROCEDURE — 76642 US BREAST RIGHT LIMITED: ICD-10-PCS | Mod: 26,RT,, | Performed by: RADIOLOGY

## 2021-08-03 PROCEDURE — 77065 MAMMO DIGITAL DIAGNOSTIC RIGHT WITH TOMO: ICD-10-PCS | Mod: 26,RT,, | Performed by: RADIOLOGY

## 2021-08-03 PROCEDURE — 76642 ULTRASOUND BREAST LIMITED: CPT | Mod: 26,RT,, | Performed by: RADIOLOGY

## 2021-08-03 PROCEDURE — 77061 BREAST TOMOSYNTHESIS UNI: CPT | Mod: 26,RT,, | Performed by: RADIOLOGY

## 2021-08-23 ENCOUNTER — PATIENT MESSAGE (OUTPATIENT)
Dept: INTERNAL MEDICINE | Facility: CLINIC | Age: 71
End: 2021-08-23

## 2021-09-15 ENCOUNTER — IMMUNIZATION (OUTPATIENT)
Dept: INTERNAL MEDICINE | Facility: CLINIC | Age: 71
End: 2021-09-15
Payer: MEDICARE

## 2021-09-15 DIAGNOSIS — Z23 NEED FOR VACCINATION: Primary | ICD-10-CM

## 2021-09-15 PROCEDURE — 0003A COVID-19, MRNA, LNP-S, PF, 30 MCG/0.3 ML DOSE VACCINE: ICD-10-PCS | Mod: CV19,,, | Performed by: INTERNAL MEDICINE

## 2021-09-15 PROCEDURE — 91300 COVID-19, MRNA, LNP-S, PF, 30 MCG/0.3 ML DOSE VACCINE: CPT | Mod: ,,, | Performed by: INTERNAL MEDICINE

## 2021-09-15 PROCEDURE — 91300 COVID-19, MRNA, LNP-S, PF, 30 MCG/0.3 ML DOSE VACCINE: ICD-10-PCS | Mod: ,,, | Performed by: INTERNAL MEDICINE

## 2021-09-15 PROCEDURE — 0003A COVID-19, MRNA, LNP-S, PF, 30 MCG/0.3 ML DOSE VACCINE: CPT | Mod: CV19,,, | Performed by: INTERNAL MEDICINE

## 2021-10-07 ENCOUNTER — TELEPHONE (OUTPATIENT)
Dept: INTERNAL MEDICINE | Facility: CLINIC | Age: 71
End: 2021-10-07

## 2021-10-07 DIAGNOSIS — E78.00 PURE HYPERCHOLESTEROLEMIA: ICD-10-CM

## 2021-10-07 DIAGNOSIS — C50.412 MALIGNANT NEOPLASM OF UPPER-OUTER QUADRANT OF LEFT BREAST IN FEMALE, ESTROGEN RECEPTOR POSITIVE: ICD-10-CM

## 2021-10-07 DIAGNOSIS — I25.10 ARTERIOSCLEROTIC CORONARY ARTERY DISEASE: ICD-10-CM

## 2021-10-07 DIAGNOSIS — M81.8 OSTEOPOROSIS, IDIOPATHIC: ICD-10-CM

## 2021-10-07 DIAGNOSIS — Z17.0 MALIGNANT NEOPLASM OF UPPER-OUTER QUADRANT OF LEFT BREAST IN FEMALE, ESTROGEN RECEPTOR POSITIVE: ICD-10-CM

## 2021-10-08 ENCOUNTER — CLINICAL SUPPORT (OUTPATIENT)
Dept: PRIMARY CARE CLINIC | Facility: CLINIC | Age: 71
End: 2021-10-08
Payer: MEDICARE

## 2021-10-08 PROCEDURE — G0008 ADMIN INFLUENZA VIRUS VAC: HCPCS | Mod: PBBFAC,PN

## 2021-10-08 PROCEDURE — 90694 VACC AIIV4 NO PRSRV 0.5ML IM: CPT | Mod: PBBFAC,PN

## 2021-10-12 ENCOUNTER — HOSPITAL ENCOUNTER (OUTPATIENT)
Dept: CARDIOLOGY | Facility: CLINIC | Age: 71
Discharge: HOME | End: 2021-10-12
Payer: MEDICARE

## 2021-10-12 ENCOUNTER — OFFICE VISIT (OUTPATIENT)
Dept: INTERNAL MEDICINE | Facility: CLINIC | Age: 71
End: 2021-10-12
Payer: MEDICARE

## 2021-10-12 VITALS
BODY MASS INDEX: 17.96 KG/M2 | DIASTOLIC BLOOD PRESSURE: 73 MMHG | WEIGHT: 105.19 LBS | TEMPERATURE: 97 F | SYSTOLIC BLOOD PRESSURE: 116 MMHG | HEIGHT: 64 IN | HEART RATE: 86 BPM

## 2021-10-12 DIAGNOSIS — E78.00 PURE HYPERCHOLESTEROLEMIA: ICD-10-CM

## 2021-10-12 DIAGNOSIS — D05.02 LOBULAR CARCINOMA IN SITU (LCIS) OF LEFT BREAST: ICD-10-CM

## 2021-10-12 DIAGNOSIS — E04.1 THYROID NODULE: ICD-10-CM

## 2021-10-12 DIAGNOSIS — M81.8 OSTEOPOROSIS, IDIOPATHIC: Primary | ICD-10-CM

## 2021-10-12 DIAGNOSIS — C50.412 MALIGNANT NEOPLASM OF UPPER-OUTER QUADRANT OF LEFT BREAST IN FEMALE, ESTROGEN RECEPTOR POSITIVE: ICD-10-CM

## 2021-10-12 DIAGNOSIS — Z17.0 MALIGNANT NEOPLASM OF UPPER-OUTER QUADRANT OF LEFT BREAST IN FEMALE, ESTROGEN RECEPTOR POSITIVE: ICD-10-CM

## 2021-10-12 DIAGNOSIS — M81.8 OSTEOPOROSIS, IDIOPATHIC: ICD-10-CM

## 2021-10-12 DIAGNOSIS — I25.10 ARTERIOSCLEROTIC CORONARY ARTERY DISEASE: ICD-10-CM

## 2021-10-12 DIAGNOSIS — K57.32 DIVERTICULITIS, COLON: ICD-10-CM

## 2021-10-12 PROCEDURE — 93010 ELECTROCARDIOGRAM REPORT: CPT | Mod: S$PBB,,, | Performed by: INTERNAL MEDICINE

## 2021-10-12 PROCEDURE — 99214 OFFICE O/P EST MOD 30 MIN: CPT | Mod: PBBFAC | Performed by: INTERNAL MEDICINE

## 2021-10-12 PROCEDURE — 93005 ELECTROCARDIOGRAM TRACING: CPT | Mod: PBBFAC | Performed by: INTERNAL MEDICINE

## 2021-10-12 PROCEDURE — 99999 PR PBB SHADOW E&M-EST. PATIENT-LVL IV: ICD-10-PCS | Mod: PBBFAC,,, | Performed by: INTERNAL MEDICINE

## 2021-10-12 PROCEDURE — 99999 PR PBB SHADOW E&M-EST. PATIENT-LVL IV: CPT | Mod: PBBFAC,,, | Performed by: INTERNAL MEDICINE

## 2021-10-12 PROCEDURE — 93010 EKG 12-LEAD: ICD-10-PCS | Mod: S$PBB,,, | Performed by: INTERNAL MEDICINE

## 2021-10-12 PROCEDURE — 99215 PR OFFICE/OUTPT VISIT, EST, LEVL V, 40-54 MIN: ICD-10-PCS | Mod: S$PBB,,, | Performed by: INTERNAL MEDICINE

## 2021-10-12 PROCEDURE — 99215 OFFICE O/P EST HI 40 MIN: CPT | Mod: S$PBB,,, | Performed by: INTERNAL MEDICINE

## 2021-10-13 DIAGNOSIS — M81.8 OSTEOPOROSIS, IDIOPATHIC: Primary | ICD-10-CM

## 2021-10-13 RX ORDER — ACETAMINOPHEN 500 MG
500 TABLET ORAL
Status: CANCELLED | OUTPATIENT
Start: 2021-10-13

## 2021-10-13 RX ORDER — ZOLEDRONIC ACID 5 MG/100ML
5 INJECTION, SOLUTION INTRAVENOUS ONCE
Status: CANCELLED | OUTPATIENT
Start: 2021-10-13 | End: 2021-10-13

## 2021-10-13 RX ORDER — SODIUM CHLORIDE 0.9 % (FLUSH) 0.9 %
10 SYRINGE (ML) INJECTION
Status: CANCELLED | OUTPATIENT
Start: 2021-10-13

## 2021-10-13 RX ORDER — HEPARIN SODIUM (PORCINE) LOCK FLUSH IV SOLN 100 UNIT/ML 100 UNIT/ML
100 SOLUTION INTRAVENOUS
Status: CANCELLED | OUTPATIENT
Start: 2021-10-13

## 2021-10-14 DIAGNOSIS — M81.8 OSTEOPOROSIS, IDIOPATHIC: Primary | ICD-10-CM

## 2021-10-14 RX ORDER — HEPARIN 100 UNIT/ML
500 SYRINGE INTRAVENOUS
Status: CANCELLED | OUTPATIENT
Start: 2021-10-21

## 2021-10-14 RX ORDER — ACETAMINOPHEN 500 MG
500 TABLET ORAL
Status: CANCELLED | OUTPATIENT
Start: 2021-10-21

## 2021-10-14 RX ORDER — SODIUM CHLORIDE 0.9 % (FLUSH) 0.9 %
10 SYRINGE (ML) INJECTION
Status: CANCELLED | OUTPATIENT
Start: 2021-10-21

## 2021-10-14 RX ORDER — ZOLEDRONIC ACID 5 MG/100ML
5 INJECTION, SOLUTION INTRAVENOUS
Status: CANCELLED | OUTPATIENT
Start: 2021-10-21

## 2021-11-22 ENCOUNTER — HOSPITAL ENCOUNTER (OUTPATIENT)
Dept: RADIOLOGY | Facility: OTHER | Age: 71
Discharge: HOME | End: 2021-11-22
Attending: INTERNAL MEDICINE
Payer: MEDICARE

## 2021-11-22 DIAGNOSIS — M81.8 OSTEOPOROSIS, IDIOPATHIC: ICD-10-CM

## 2021-11-22 PROCEDURE — 77080 DXA BONE DENSITY AXIAL: CPT | Mod: TC

## 2021-11-22 PROCEDURE — 77080 DEXA BONE DENSITY SPINE HIP: ICD-10-PCS | Mod: 26,,, | Performed by: RADIOLOGY

## 2021-11-22 PROCEDURE — 77080 DXA BONE DENSITY AXIAL: CPT | Mod: 26,,, | Performed by: RADIOLOGY

## 2021-12-02 ENCOUNTER — PATIENT MESSAGE (OUTPATIENT)
Dept: INTERNAL MEDICINE | Facility: CLINIC | Age: 71
End: 2021-12-02
Payer: MEDICARE

## 2021-12-02 ENCOUNTER — PATIENT MESSAGE (OUTPATIENT)
Dept: HEMATOLOGY/ONCOLOGY | Facility: CLINIC | Age: 71
End: 2021-12-02
Payer: MEDICARE

## 2021-12-15 ENCOUNTER — PATIENT MESSAGE (OUTPATIENT)
Dept: HEMATOLOGY/ONCOLOGY | Facility: CLINIC | Age: 71
End: 2021-12-15
Payer: MEDICARE

## 2021-12-16 DIAGNOSIS — C50.412 MALIGNANT NEOPLASM OF UPPER-OUTER QUADRANT OF LEFT BREAST IN FEMALE, ESTROGEN RECEPTOR POSITIVE: Primary | ICD-10-CM

## 2021-12-16 DIAGNOSIS — Z17.0 MALIGNANT NEOPLASM OF UPPER-OUTER QUADRANT OF LEFT BREAST IN FEMALE, ESTROGEN RECEPTOR POSITIVE: Primary | ICD-10-CM

## 2021-12-21 ENCOUNTER — INFUSION (OUTPATIENT)
Dept: INFUSION THERAPY | Facility: HOSPITAL | Age: 71
End: 2021-12-21
Payer: MEDICARE

## 2021-12-21 VITALS
HEART RATE: 88 BPM | SYSTOLIC BLOOD PRESSURE: 104 MMHG | HEIGHT: 64 IN | WEIGHT: 105.63 LBS | TEMPERATURE: 98 F | DIASTOLIC BLOOD PRESSURE: 59 MMHG | RESPIRATION RATE: 18 BRPM | BODY MASS INDEX: 18.03 KG/M2

## 2021-12-21 DIAGNOSIS — M81.8 OSTEOPOROSIS, IDIOPATHIC: Primary | ICD-10-CM

## 2021-12-21 PROCEDURE — 63600175 PHARM REV CODE 636 W HCPCS: Performed by: INTERNAL MEDICINE

## 2021-12-21 PROCEDURE — 96365 THER/PROPH/DIAG IV INF INIT: CPT

## 2021-12-21 RX ORDER — ZOLEDRONIC ACID 5 MG/100ML
5 INJECTION, SOLUTION INTRAVENOUS
Status: COMPLETED | OUTPATIENT
Start: 2021-12-21 | End: 2021-12-21

## 2021-12-21 RX ORDER — ACETAMINOPHEN 500 MG
500 TABLET ORAL
Status: CANCELLED | OUTPATIENT
Start: 2021-12-21

## 2021-12-21 RX ORDER — SODIUM CHLORIDE 0.9 % (FLUSH) 0.9 %
10 SYRINGE (ML) INJECTION
Status: CANCELLED | OUTPATIENT
Start: 2021-12-21

## 2021-12-21 RX ORDER — ACETAMINOPHEN 500 MG
500 TABLET ORAL
Status: DISCONTINUED | OUTPATIENT
Start: 2021-12-21 | End: 2021-12-21 | Stop reason: HOSPADM

## 2021-12-21 RX ORDER — HEPARIN 100 UNIT/ML
500 SYRINGE INTRAVENOUS
Status: CANCELLED | OUTPATIENT
Start: 2021-12-21

## 2021-12-21 RX ORDER — ZOLEDRONIC ACID 5 MG/100ML
5 INJECTION, SOLUTION INTRAVENOUS
Status: CANCELLED | OUTPATIENT
Start: 2021-12-21

## 2021-12-21 RX ADMIN — ZOLEDRONIC ACID 5 MG: 5 INJECTION, SOLUTION INTRAVENOUS at 08:12

## 2022-01-03 ENCOUNTER — PATIENT MESSAGE (OUTPATIENT)
Dept: INTERNAL MEDICINE | Facility: CLINIC | Age: 72
End: 2022-01-03
Payer: MEDICARE

## 2022-01-17 ENCOUNTER — PATIENT MESSAGE (OUTPATIENT)
Dept: HEMATOLOGY/ONCOLOGY | Facility: CLINIC | Age: 72
End: 2022-01-17
Payer: MEDICARE

## 2022-01-17 ENCOUNTER — PATIENT MESSAGE (OUTPATIENT)
Dept: INTERNAL MEDICINE | Facility: CLINIC | Age: 72
End: 2022-01-17
Payer: MEDICARE

## 2022-01-18 ENCOUNTER — PATIENT MESSAGE (OUTPATIENT)
Dept: INTERNAL MEDICINE | Facility: CLINIC | Age: 72
End: 2022-01-18
Payer: MEDICARE

## 2022-01-18 ENCOUNTER — HOSPITAL ENCOUNTER (OUTPATIENT)
Dept: RADIOLOGY | Facility: HOSPITAL | Age: 72
Discharge: HOME | End: 2022-01-18
Attending: NURSE PRACTITIONER
Payer: MEDICARE

## 2022-01-18 DIAGNOSIS — C50.412 MALIGNANT NEOPLASM OF UPPER-OUTER QUADRANT OF LEFT BREAST IN FEMALE, ESTROGEN RECEPTOR POSITIVE: ICD-10-CM

## 2022-01-18 DIAGNOSIS — Z17.0 MALIGNANT NEOPLASM OF UPPER-OUTER QUADRANT OF LEFT BREAST IN FEMALE, ESTROGEN RECEPTOR POSITIVE: ICD-10-CM

## 2022-01-18 PROCEDURE — 77049 MRI BREAST C-+ W/CAD BI: CPT | Mod: TC

## 2022-01-18 PROCEDURE — 77049 MRI BREAST W/WO CONTRAST, W/CAD, BILATERAL: ICD-10-PCS | Mod: 26,,, | Performed by: RADIOLOGY

## 2022-01-18 PROCEDURE — 77049 MRI BREAST C-+ W/CAD BI: CPT | Mod: 26,,, | Performed by: RADIOLOGY

## 2022-01-18 PROCEDURE — 25500020 PHARM REV CODE 255: Performed by: NURSE PRACTITIONER

## 2022-01-18 PROCEDURE — A9577 INJ MULTIHANCE: HCPCS | Performed by: NURSE PRACTITIONER

## 2022-01-18 RX ADMIN — GADOBENATE DIMEGLUMINE 11 ML: 529 INJECTION, SOLUTION INTRAVENOUS at 09:01

## 2022-02-11 ENCOUNTER — TELEPHONE (OUTPATIENT)
Dept: HEMATOLOGY/ONCOLOGY | Facility: CLINIC | Age: 72
End: 2022-02-11
Payer: MEDICARE

## 2022-03-09 NOTE — PROGRESS NOTES
Subjective:       Patient ID: Saritha Guzman is a 71 y.o. female.    Chief Complaint: Malignant neoplasm of upper-outer quadrant of left breast i    HPI Ms Guzman is a 70y/o WF Is seen for F/U of left breast cancer. She had stage I ER positive disease with a high Oncotype score. She is currently on letrozole.    States she is doing well. She has completed 7 years on letrozole. She does have some challenges with arthralgias.   Occasional diarrhea; colonoscopy WNL    She is walking 2 miles 3-4 times a week, swims during the summer. She has also started playing [employer] ball.   Appetite is good. Bowel movements are good after a bout of diverticulitis.   Still with some sleep disturbances.   Follows with Dr. Jiang.    She continues on atorvastatin for her hyperlipidemia with good control.  She is on reclast for osteoporosis and received her last infusion in December 2021 - due in a year.     Per Dr. Salazar's previous note: History: mammogram in June 2013 showed pleomorphic calcifications left breast at the 2:00 position. By ultrasound there was a 2.4 cm asymmetric density.     A core needle biopsy showed LCIS and an MRI of the breast showed an irregular mass at the 2:00 position measured 3.9 x 2.1 cm with no adenopathy. 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 On July 12 Lumpectomy showed multiple foci of infiltrating lobular carcinoma, With the largest measuring 1.1 cm. histologic grade 3 nuclear grade 2 mitotic index 2.   The tumor was 50% ER positive-which would've moderate, AR negative and HER-2 negative.     Subsequently she underwent a left mastectomy with sentinel lymph node biopsy which showed only some residual LCIS. The sentinel lymph node was negative. Final pathological stage TI CN0 stage IA. Oncotype score was high.    She had 4 cycles of adjuvant Taxotere and Cytoxan completed January 2014 and then began adjuvant letrozole therapy.        Review of Systems   Constitutional: Negative for activity change, appetite change, chills, diaphoresis, fatigue, fever and unexpected weight change.   HENT: Negative for mouth sores, nosebleeds, sore throat and trouble swallowing.    Eyes: Negative for visual disturbance.   Respiratory: Negative for cough and shortness of breath.    Cardiovascular: Negative for chest pain, palpitations and leg swelling.   Gastrointestinal: Negative for abdominal distention, abdominal pain, blood in stool, constipation, diarrhea, nausea and vomiting.   Genitourinary: Negative for frequency, hematuria and vaginal bleeding.        Vaginal Dryness   Musculoskeletal: Positive for arthralgias (bilateral knee pain ) and back pain. Negative for myalgias.   Skin: Negative for pallor and rash.   Allergic/Immunologic: Negative for immunocompromised state.   Neurological: Negative for dizziness, weakness, light-headedness, numbness and headaches.   Hematological: Negative for adenopathy. Does not bruise/bleed easily.   Psychiatric/Behavioral: Negative for confusion and sleep disturbance. The patient is not nervous/anxious.        Objective:      Physical Exam  Vitals and nursing note reviewed.   Constitutional:       General: She is not in acute distress.     Appearance: Normal appearance. She is well-developed.   HENT:      Head: Normocephalic.   Eyes:      General: No scleral icterus.     Pupils: Pupils are equal, round, and reactive to light.   Cardiovascular:      Rate and Rhythm: Normal rate and regular rhythm.      Heart sounds: Normal heart sounds.   Pulmonary:      Effort: Pulmonary effort is normal.      Breath sounds: Normal breath sounds. No wheezing or rales.   Chest:   Breasts:      Right: No mass, nipple discharge, skin change, tenderness, axillary adenopathy or supraclavicular adenopathy.      Left: No mass, nipple discharge, skin change, tenderness, axillary adenopathy or supraclavicular adenopathy.       Abdominal:       General: Bowel sounds are normal. There is no distension.      Palpations: Abdomen is soft. There is no mass.      Tenderness: There is no abdominal tenderness.   Musculoskeletal:      Cervical back: Normal range of motion.   Lymphadenopathy:      Cervical: No cervical adenopathy.      Upper Body:      Right upper body: No supraclavicular or axillary adenopathy.      Left upper body: No supraclavicular or axillary adenopathy.   Skin:     General: Skin is warm and dry.      Findings: No rash.   Neurological:      Mental Status: She is alert and oriented to person, place, and time.   Psychiatric:         Behavior: Behavior normal.         Thought Content: Thought content normal.         Assessment:     Breast MRI is negative for malignancy.  1. Malignant neoplasm of upper-outer quadrant of left breast in female, estrogen receptor positive    2. 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 Pure hypercholesterolemia    4. Osteoporosis, idiopathic        Plan:       1-2. She completed 7 years of therapy last year and is off letrozole.    Screening MRI negative from Jan; mammogram due in July.   3. Continue current medication and follow up with PCP  4. DXA in Nov 2021; osteoporosis; On Reclast due in December 2022    Return to clinic in 1 year with [facility] appointment and imaging.     Patient is in agreement with the proposed treatment plan. All questions were answered to the patient's satisfaction. Patient knows to call clinic for any new or worsening symptoms and if anything is needed before the next clinic visit.          Kathleen Ramirez, FNP-C  Hematology & Medical Oncology   [address] [place] [address]  ph. [phone]  Fax. [phone]    Patient dicussed with collaborating physician, Dr. Salazar.    Route Chart for Scheduling    Med Onc Chart Routing      Follow up with physician    Follow up with [facility] 1 year.   Labs    Imaging Mammogram and MRI   Mammogram in July, MRI in Jan    Pharmacy  appointment    Other referrals            Therapy Plan Information  Pre-Medications  acetaminophen tablet 500 mg  500 mg, Oral, Every visit  Medications  zoledronic acid-mannitol & water 5 mg/100 mL infusion 5 mg  5 mg, Intravenous, Every visit  Flushes  sodium chloride 0.9% flush 10 mL  10 mL, Intravenous, PRN  heparin, porcine (PF) 100 unit/mL injection flush 500 Units  500 Units, Intravenous, PRN

## 2022-03-23 ENCOUNTER — OFFICE VISIT (OUTPATIENT)
Dept: HEMATOLOGY/ONCOLOGY | Facility: CLINIC | Age: 72
End: 2022-03-23
Payer: MEDICARE

## 2022-03-23 VITALS
OXYGEN SATURATION: 98 % | HEART RATE: 97 BPM | BODY MASS INDEX: 18.71 KG/M2 | RESPIRATION RATE: 16 BRPM | HEIGHT: 63 IN | DIASTOLIC BLOOD PRESSURE: 58 MMHG | WEIGHT: 105.63 LBS | SYSTOLIC BLOOD PRESSURE: 113 MMHG | TEMPERATURE: 98 F

## 2022-03-23 DIAGNOSIS — Z17.0 MALIGNANT NEOPLASM OF UPPER-OUTER QUADRANT OF LEFT BREAST IN FEMALE, ESTROGEN RECEPTOR POSITIVE: Primary | ICD-10-CM

## 2022-03-23 DIAGNOSIS — D05.02 LOBULAR CARCINOMA IN SITU (LCIS) OF LEFT BREAST: ICD-10-CM

## 2022-03-23 DIAGNOSIS — C50.412 MALIGNANT NEOPLASM OF UPPER-OUTER QUADRANT OF LEFT BREAST IN FEMALE, ESTROGEN RECEPTOR POSITIVE: Primary | ICD-10-CM

## 2022-03-23 DIAGNOSIS — Z12.31 ENCOUNTER FOR SCREENING MAMMOGRAM FOR MALIGNANT NEOPLASM OF BREAST: ICD-10-CM

## 2022-03-23 DIAGNOSIS — E78.00 PURE HYPERCHOLESTEROLEMIA: ICD-10-CM

## 2022-03-23 DIAGNOSIS — M81.8 OSTEOPOROSIS, IDIOPATHIC: ICD-10-CM

## 2022-03-23 PROCEDURE — 99999 PR PBB SHADOW E&M-EST. PATIENT-LVL V: CPT | Mod: PBBFAC,,, | Performed by: NURSE PRACTITIONER

## 2022-03-23 PROCEDURE — 99214 PR OFFICE/OUTPT VISIT, EST, LEVL IV, 30-39 MIN: ICD-10-PCS | Mod: S$PBB,,, | Performed by: NURSE PRACTITIONER

## 2022-03-23 PROCEDURE — 99215 OFFICE O/P EST HI 40 MIN: CPT | Mod: PBBFAC | Performed by: NURSE PRACTITIONER

## 2022-03-23 PROCEDURE — 99999 PR PBB SHADOW E&M-EST. PATIENT-LVL V: ICD-10-PCS | Mod: PBBFAC,,, | Performed by: NURSE PRACTITIONER

## 2022-03-23 PROCEDURE — 99214 OFFICE O/P EST MOD 30 MIN: CPT | Mod: S$PBB,,, | Performed by: NURSE PRACTITIONER

## 2022-04-05 ENCOUNTER — IMMUNIZATION (OUTPATIENT)
Dept: INTERNAL MEDICINE | Facility: CLINIC | Age: 72
End: 2022-04-05
Payer: MEDICARE

## 2022-04-05 DIAGNOSIS — Z23 NEED FOR VACCINATION: Primary | ICD-10-CM

## 2022-04-05 PROCEDURE — 91305 COVID-19, MRNA, LNP-S, PF, 30 MCG/0.3 ML DOSE VACCINE (PFIZER): CPT | Mod: PBBFAC

## 2022-05-05 ENCOUNTER — PATIENT MESSAGE (OUTPATIENT)
Dept: INTERNAL MEDICINE | Facility: CLINIC | Age: 72
End: 2022-05-05
Payer: MEDICARE

## 2022-05-05 ENCOUNTER — PATIENT MESSAGE (OUTPATIENT)
Dept: HEMATOLOGY/ONCOLOGY | Facility: CLINIC | Age: 72
End: 2022-05-05
Payer: MEDICARE

## 2022-05-23 ENCOUNTER — PATIENT MESSAGE (OUTPATIENT)
Dept: INTERNAL MEDICINE | Facility: CLINIC | Age: 72
End: 2022-05-23
Payer: MEDICARE

## 2022-05-23 DIAGNOSIS — C50.412 MALIGNANT NEOPLASM OF UPPER-OUTER QUADRANT OF LEFT BREAST IN FEMALE, ESTROGEN RECEPTOR POSITIVE: ICD-10-CM

## 2022-05-23 DIAGNOSIS — Z17.0 MALIGNANT NEOPLASM OF UPPER-OUTER QUADRANT OF LEFT BREAST IN FEMALE, ESTROGEN RECEPTOR POSITIVE: ICD-10-CM

## 2022-05-23 DIAGNOSIS — F41.9 ANXIETY: ICD-10-CM

## 2022-05-24 RX ORDER — LORAZEPAM 1 MG/1
1 TABLET ORAL EVERY 12 HOURS PRN
Qty: 30 TABLET | Refills: 2 | Status: SHIPPED | OUTPATIENT
Start: 2022-05-24 | End: 2023-08-11 | Stop reason: SDUPTHER

## 2022-05-24 RX ORDER — ALPRAZOLAM 0.25 MG/1
0.25 TABLET ORAL DAILY PRN
Qty: 15 TABLET | Refills: 0 | Status: SHIPPED | OUTPATIENT
Start: 2022-05-24 | End: 2023-05-24

## 2022-05-24 RX ORDER — ATORVASTATIN CALCIUM 20 MG/1
20 TABLET, FILM COATED ORAL DAILY
Qty: 90 TABLET | Refills: 3 | Status: SHIPPED | OUTPATIENT
Start: 2022-05-24 | End: 2022-10-12 | Stop reason: SDUPTHER

## 2022-07-01 ENCOUNTER — LAB VISIT (OUTPATIENT)
Dept: INTERNAL MEDICINE | Facility: CLINIC | Age: 72
End: 2022-07-01
Payer: MEDICARE

## 2022-07-01 DIAGNOSIS — Z11.52 ENCOUNTER FOR SCREENING FOR COVID-19: Primary | ICD-10-CM

## 2022-07-01 LAB
CTP QC/QA: YES
SARS-COV-2 RDRP RESP QL NAA+PROBE: NEGATIVE

## 2022-07-01 PROCEDURE — U0002 COVID-19 LAB TEST NON-CDC: HCPCS | Mod: PBBFAC

## 2022-07-25 ENCOUNTER — PATIENT MESSAGE (OUTPATIENT)
Dept: INTERNAL MEDICINE | Facility: CLINIC | Age: 72
End: 2022-07-25
Payer: MEDICARE

## 2022-07-29 ENCOUNTER — HOSPITAL ENCOUNTER (OUTPATIENT)
Dept: RADIOLOGY | Facility: HOSPITAL | Age: 72
Discharge: HOME | End: 2022-07-29
Attending: NURSE PRACTITIONER
Payer: MEDICARE

## 2022-07-29 DIAGNOSIS — Z12.31 ENCOUNTER FOR SCREENING MAMMOGRAM FOR MALIGNANT NEOPLASM OF BREAST: ICD-10-CM

## 2022-07-29 DIAGNOSIS — Z17.0 MALIGNANT NEOPLASM OF UPPER-OUTER QUADRANT OF LEFT BREAST IN FEMALE, ESTROGEN RECEPTOR POSITIVE: ICD-10-CM

## 2022-07-29 DIAGNOSIS — D05.02 LOBULAR CARCINOMA IN SITU (LCIS) OF LEFT BREAST: ICD-10-CM

## 2022-07-29 DIAGNOSIS — C50.412 MALIGNANT NEOPLASM OF UPPER-OUTER QUADRANT OF LEFT BREAST IN FEMALE, ESTROGEN RECEPTOR POSITIVE: ICD-10-CM

## 2022-07-29 PROCEDURE — 77067 SCR MAMMO BI INCL CAD: CPT | Mod: 26,52,, | Performed by: RADIOLOGY

## 2022-07-29 PROCEDURE — 77063 BREAST TOMOSYNTHESIS BI: CPT | Mod: 26,52,, | Performed by: RADIOLOGY

## 2022-07-29 PROCEDURE — 77063 MAMMO DIGITAL SCREENING RIGHT WITH TOMO: ICD-10-PCS | Mod: 26,52,, | Performed by: RADIOLOGY

## 2022-07-29 PROCEDURE — 77067 SCR MAMMO BI INCL CAD: CPT | Mod: TC

## 2022-07-29 PROCEDURE — 77067 MAMMO DIGITAL SCREENING RIGHT WITH TOMO: ICD-10-PCS | Mod: 26,52,, | Performed by: RADIOLOGY

## 2022-08-04 ENCOUNTER — PATIENT MESSAGE (OUTPATIENT)
Dept: INTERNAL MEDICINE | Facility: CLINIC | Age: 72
End: 2022-08-04
Payer: MEDICARE

## 2022-08-06 ENCOUNTER — OFFICE VISIT (OUTPATIENT)
Dept: URGENT CARE | Facility: CLINIC | Age: 72
End: 2022-08-06
Payer: MEDICARE

## 2022-08-06 VITALS
BODY MASS INDEX: 18.61 KG/M2 | HEIGHT: 63 IN | HEART RATE: 94 BPM | RESPIRATION RATE: 16 BRPM | SYSTOLIC BLOOD PRESSURE: 99 MMHG | OXYGEN SATURATION: 96 % | TEMPERATURE: 98 F | WEIGHT: 105 LBS | DIASTOLIC BLOOD PRESSURE: 64 MMHG

## 2022-08-06 DIAGNOSIS — T78.40XA RASH DUE TO ALLERGY: Primary | ICD-10-CM

## 2022-08-06 DIAGNOSIS — R21 RASH DUE TO ALLERGY: Primary | ICD-10-CM

## 2022-08-06 PROCEDURE — 99213 OFFICE O/P EST LOW 20 MIN: CPT | Mod: S$GLB,,, | Performed by: FAMILY MEDICINE

## 2022-08-06 PROCEDURE — 99213 PR OFFICE/OUTPT VISIT, EST, LEVL III, 20-29 MIN: ICD-10-PCS | Mod: S$GLB,,, | Performed by: FAMILY MEDICINE

## 2022-08-06 RX ORDER — TRIAMCINOLONE ACETONIDE 1 MG/G
CREAM TOPICAL 2 TIMES DAILY
Qty: 30 G | Refills: 0 | Status: SHIPPED | OUTPATIENT
Start: 2022-08-06 | End: 2023-10-26

## 2022-08-06 RX ORDER — HYDROXYZINE PAMOATE 25 MG/1
25 CAPSULE ORAL 2 TIMES DAILY
Qty: 14 CAPSULE | Refills: 0 | Status: SHIPPED | OUTPATIENT
Start: 2022-08-06 | End: 2022-08-13

## 2022-08-06 RX ORDER — FAMOTIDINE 20 MG/1
20 TABLET, FILM COATED ORAL 2 TIMES DAILY
Qty: 10 TABLET | Refills: 0 | Status: SHIPPED | OUTPATIENT
Start: 2022-08-06 | End: 2022-11-09

## 2022-08-06 NOTE — PROGRESS NOTES
"Subjective:       Patient ID: Saritha Guzman is a 72 y.o. female.    Vitals:  height is 5' 3" (1.6 m) and weight is 47.6 kg (105 lb). Her temporal temperature is 97.7 °F (36.5 °C). Her blood pressure is 99/64 and her pulse is 94. Her respiration is 16 and oxygen saturation is 96%.     Chief Complaint: Rash    Onset lastnight. After eating Pt started itching on the legs. Rash moved to torso at 1AM. No OTC medications for rash. Pt states she mage and ate chicken enchiladas for the first time last night. Denies taking any new medicines or body products.. Pt denies any CP, SOB, throat closing, tongue swelling, fever, HA, dizziness, weakness, N/V. Pt had a bad experience with oral steroids last time and do not want to take oral or injectable steroids.    Rash  This is a new problem. The current episode started yesterday. The problem has been gradually worsening since onset. The rash is diffuse. The rash is characterized by itchiness, redness and swelling. It is unknown if there was an exposure to a precipitant. Pertinent negatives include no fever or shortness of breath. Past treatments include nothing. The treatment provided no relief. There is no history of allergies, asthma or eczema.       Constitution: Negative for fever.   Respiratory: Negative for shortness of breath.    Skin: Positive for rash.       Objective:      Physical Exam   Constitutional: She is oriented to person, place, and time. She appears well-developed. She is cooperative.  Non-toxic appearance. She does not appear ill. No distress.   HENT:   Head: Normocephalic and atraumatic.   Ears:   Right Ear: Hearing normal.   Left Ear: Hearing normal.   Nose: Nose normal. No mucosal edema, rhinorrhea, nasal deformity or congestion. No epistaxis. Right sinus exhibits no maxillary sinus tenderness and no frontal sinus tenderness. Left sinus exhibits no maxillary sinus tenderness and no frontal sinus tenderness.   Mouth/Throat: Uvula is midline, oropharynx is " clear and moist and mucous membranes are normal. No trismus in the jaw. Normal dentition. No uvula swelling. No oropharyngeal exudate or posterior oropharyngeal erythema.   Eyes: Conjunctivae and lids are normal. Right eye exhibits no discharge. Left eye exhibits no discharge. No scleral icterus.   Neck: Trachea normal and phonation normal. Neck supple.   Cardiovascular: Normal rate, regular rhythm, normal heart sounds and normal pulses.   No murmur heard.  Pulmonary/Chest: Effort normal and breath sounds normal. No stridor. No respiratory distress. She has no wheezes. She has no rhonchi. She has no rales.   Abdominal: Normal appearance and bowel sounds are normal. She exhibits no distension and no mass. Soft. There is no abdominal tenderness.   Musculoskeletal: Normal range of motion.         General: No deformity. Normal range of motion.      Cervical back: She exhibits no tenderness.   Lymphadenopathy:     She has no cervical adenopathy.   Neurological: She is alert and oriented to person, place, and time. She exhibits normal muscle tone. Coordination normal.   Skin: Skin is warm, dry, intact, not diaphoretic, not pale and rash.         Comments: +ve diffuse erythematous rash with hives over trunk more than extremities.   No face, tongue, throat swelling.    Psychiatric: Her speech is normal and behavior is normal. Judgment and thought content normal.   Nursing note and vitals reviewed.        Assessment:       1. Rash due to allergy          Plan:     Discussed results/diagnosis/plan with patient in clinic. Advised to f/u with PCP within 2-5 days. ER precautions given if symptoms get any worse. All questions answered. Patient verbally understood and agreed with treatment plan.    Rash due to allergy    Other orders  -     hydrOXYzine pamoate (VISTARIL) 25 MG Cap; Take 1 capsule (25 mg total) by mouth 2 (two) times a day. for 7 days  Dispense: 14 capsule; Refill: 0  -     famotidine (PEPCID) 20 MG tablet; Take 1  tablet (20 mg total) by mouth 2 (two) times daily. for 5 days  Dispense: 10 tablet; Refill: 0  -     triamcinolone acetonide 0.1% (KENALOG) 0.1 % cream; Apply topically 2 (two) times daily. Apply as needed over itchy areas of rash. Not for face.  Dispense: 30 g; Refill: 0

## 2022-10-12 ENCOUNTER — TELEPHONE (OUTPATIENT)
Dept: INTERNAL MEDICINE | Facility: CLINIC | Age: 72
End: 2022-10-12
Payer: MEDICARE

## 2022-10-12 DIAGNOSIS — E04.2 MULTINODULAR GOITER: Primary | ICD-10-CM

## 2022-10-12 RX ORDER — EZETIMIBE 10 MG/1
10 TABLET ORAL DAILY
Qty: 90 TABLET | Refills: 3 | Status: SHIPPED | OUTPATIENT
Start: 2022-10-12 | End: 2023-10-03

## 2022-10-12 RX ORDER — ATORVASTATIN CALCIUM 20 MG/1
20 TABLET, FILM COATED ORAL DAILY
Qty: 90 TABLET | Refills: 3 | Status: SHIPPED | OUTPATIENT
Start: 2022-10-12 | End: 2023-12-26

## 2022-10-19 ENCOUNTER — IMMUNIZATION (OUTPATIENT)
Dept: INTERNAL MEDICINE | Facility: CLINIC | Age: 72
End: 2022-10-19
Payer: MEDICARE

## 2022-10-19 DIAGNOSIS — Z23 NEED FOR VACCINATION: Primary | ICD-10-CM

## 2022-10-19 PROCEDURE — 91312 COVID-19, MRNA, LNP-S, BIVALENT BOOSTER, PF, 30 MCG/0.3 ML DOSE: CPT | Mod: PBBFAC

## 2022-10-19 PROCEDURE — 0124A COVID-19, MRNA, LNP-S, BIVALENT BOOSTER, PF, 30 MCG/0.3 ML DOSE: CPT | Mod: PBBFAC

## 2022-10-26 ENCOUNTER — HOSPITAL ENCOUNTER (OUTPATIENT)
Dept: RADIOLOGY | Facility: OTHER | Age: 72
Discharge: HOME | End: 2022-10-26
Attending: INTERNAL MEDICINE
Payer: MEDICARE

## 2022-10-26 DIAGNOSIS — E04.2 MULTINODULAR GOITER: ICD-10-CM

## 2022-10-26 PROCEDURE — 76536 US EXAM OF HEAD AND NECK: CPT | Mod: 26,,, | Performed by: RADIOLOGY

## 2022-10-26 PROCEDURE — 76536 US THYROID: ICD-10-PCS | Mod: 26,,, | Performed by: RADIOLOGY

## 2022-10-26 PROCEDURE — 76536 US EXAM OF HEAD AND NECK: CPT | Mod: TC

## 2022-11-09 ENCOUNTER — CLINICAL SUPPORT (OUTPATIENT)
Dept: INTERNAL MEDICINE | Facility: CLINIC | Age: 72
End: 2022-11-09
Payer: MEDICARE

## 2022-11-09 ENCOUNTER — OFFICE VISIT (OUTPATIENT)
Dept: INTERNAL MEDICINE | Facility: CLINIC | Age: 72
End: 2022-11-09
Payer: MEDICARE

## 2022-11-09 VITALS
HEART RATE: 71 BPM | HEIGHT: 63 IN | SYSTOLIC BLOOD PRESSURE: 123 MMHG | BODY MASS INDEX: 18.9 KG/M2 | DIASTOLIC BLOOD PRESSURE: 69 MMHG | WEIGHT: 106.69 LBS | TEMPERATURE: 98 F

## 2022-11-09 DIAGNOSIS — E04.2 MULTINODULAR GOITER: ICD-10-CM

## 2022-11-09 DIAGNOSIS — R79.9 ELEVATED BUN: ICD-10-CM

## 2022-11-09 DIAGNOSIS — D75.89 MACROCYTOSIS: ICD-10-CM

## 2022-11-09 DIAGNOSIS — E78.00 PURE HYPERCHOLESTEROLEMIA: ICD-10-CM

## 2022-11-09 DIAGNOSIS — R53.83 FATIGUE, UNSPECIFIED TYPE: ICD-10-CM

## 2022-11-09 DIAGNOSIS — R73.09 IMPAIRED GLUCOSE TOLERANCE TEST: ICD-10-CM

## 2022-11-09 DIAGNOSIS — R82.90 NONSPECIFIC FINDING ON EXAMINATION OF URINE: ICD-10-CM

## 2022-11-09 DIAGNOSIS — R53.0 NEOPLASTIC MALIGNANT RELATED FATIGUE: ICD-10-CM

## 2022-11-09 DIAGNOSIS — E78.5 HYPERLIPIDEMIA, UNSPECIFIED HYPERLIPIDEMIA TYPE: Primary | ICD-10-CM

## 2022-11-09 DIAGNOSIS — Z00.00 ROUTINE GENERAL MEDICAL EXAMINATION AT A HEALTH CARE FACILITY: Primary | ICD-10-CM

## 2022-11-09 DIAGNOSIS — Z85.3 HISTORY OF BREAST CANCER: ICD-10-CM

## 2022-11-09 DIAGNOSIS — R79.9 ABNORMAL BLOOD CHEMISTRY: ICD-10-CM

## 2022-11-09 DIAGNOSIS — E55.9 VITAMIN D DEFICIENCY: ICD-10-CM

## 2022-11-09 DIAGNOSIS — M81.8 OSTEOPOROSIS, IDIOPATHIC: Primary | ICD-10-CM

## 2022-11-09 LAB
25(OH)D3+25(OH)D2 SERPL-MCNC: 50 NG/ML (ref 30–96)
ALBUMIN SERPL BCP-MCNC: 3.8 G/DL (ref 3.5–5.2)
ALP SERPL-CCNC: 63 U/L (ref 55–135)
ALT SERPL W/O P-5'-P-CCNC: 34 U/L (ref 10–44)
ANION GAP SERPL CALC-SCNC: 7 MMOL/L (ref 8–16)
AST SERPL-CCNC: 25 U/L (ref 10–40)
BILIRUB SERPL-MCNC: 0.8 MG/DL (ref 0.1–1)
BUN SERPL-MCNC: 30 MG/DL (ref 8–23)
CALCIUM SERPL-MCNC: 9.4 MG/DL (ref 8.7–10.5)
CHLORIDE SERPL-SCNC: 109 MMOL/L (ref 95–110)
CHOLEST SERPL-MCNC: 129 MG/DL (ref 120–199)
CHOLEST/HDLC SERPL: 2.4 {RATIO} (ref 2–5)
CO2 SERPL-SCNC: 23 MMOL/L (ref 23–29)
CREAT SERPL-MCNC: 1 MG/DL (ref 0.5–1.4)
ERYTHROCYTE [DISTWIDTH] IN BLOOD BY AUTOMATED COUNT: 12.3 % (ref 11.5–14.5)
EST. GFR  (NO RACE VARIABLE): 59.5 ML/MIN/1.73 M^2
ESTIMATED AVG GLUCOSE: 111 MG/DL (ref 68–131)
GLUCOSE SERPL-MCNC: 72 MG/DL (ref 70–110)
HBA1C MFR BLD: 5.5 % (ref 4–5.6)
HCT VFR BLD AUTO: 38.3 % (ref 37–48.5)
HDLC SERPL-MCNC: 54 MG/DL (ref 40–75)
HDLC SERPL: 41.9 % (ref 20–50)
HGB BLD-MCNC: 12.3 G/DL (ref 12–16)
LDLC SERPL CALC-MCNC: 64 MG/DL (ref 63–159)
MCH RBC QN AUTO: 31.8 PG (ref 27–31)
MCHC RBC AUTO-ENTMCNC: 32.1 G/DL (ref 32–36)
MCV RBC AUTO: 99 FL (ref 82–98)
MICROSCOPIC COMMENT: NORMAL
NONHDLC SERPL-MCNC: 75 MG/DL
PLATELET # BLD AUTO: 205 K/UL (ref 150–450)
PMV BLD AUTO: 10.8 FL (ref 9.2–12.9)
POTASSIUM SERPL-SCNC: 4.2 MMOL/L (ref 3.5–5.1)
PROT SERPL-MCNC: 6.4 G/DL (ref 6–8.4)
RBC # BLD AUTO: 3.87 M/UL (ref 4–5.4)
SODIUM SERPL-SCNC: 139 MMOL/L (ref 136–145)
TRIGL SERPL-MCNC: 55 MG/DL (ref 30–150)
TSH SERPL DL<=0.005 MIU/L-ACNC: 1.38 UIU/ML (ref 0.4–4)
WBC # BLD AUTO: 5.42 K/UL (ref 3.9–12.7)

## 2022-11-09 PROCEDURE — 99211 OFF/OP EST MAY X REQ PHY/QHP: CPT | Mod: PBBFAC

## 2022-11-09 PROCEDURE — 99999 PR PBB SHADOW E&M-EST. PATIENT-LVL I: CPT | Mod: CHM,PBBFAC,,

## 2022-11-09 PROCEDURE — 99214 OFFICE O/P EST MOD 30 MIN: CPT | Mod: S$PBB,,, | Performed by: INTERNAL MEDICINE

## 2022-11-09 PROCEDURE — 99999 PR PBB SHADOW E&M-EST. PATIENT-LVL III: CPT | Mod: PBBFAC,,, | Performed by: INTERNAL MEDICINE

## 2022-11-09 PROCEDURE — 99999 PR PBB SHADOW E&M-EST. PATIENT-LVL I: ICD-10-PCS | Mod: CHM,PBBFAC,,

## 2022-11-09 PROCEDURE — 99211 OFF/OP EST MAY X REQ PHY/QHP: CPT | Mod: CHM,PBBFAC,27

## 2022-11-09 PROCEDURE — 84443 ASSAY THYROID STIM HORMONE: CPT | Performed by: INTERNAL MEDICINE

## 2022-11-09 PROCEDURE — 80053 COMPREHEN METABOLIC PANEL: CPT | Performed by: INTERNAL MEDICINE

## 2022-11-09 PROCEDURE — 99999 PR PBB SHADOW E&M-EST. PATIENT-LVL I: ICD-10-PCS | Mod: PBBFAC,,,

## 2022-11-09 PROCEDURE — 81001 URINALYSIS AUTO W/SCOPE: CPT | Performed by: INTERNAL MEDICINE

## 2022-11-09 PROCEDURE — 85027 COMPLETE CBC AUTOMATED: CPT | Performed by: INTERNAL MEDICINE

## 2022-11-09 PROCEDURE — 80061 LIPID PANEL: CPT | Performed by: INTERNAL MEDICINE

## 2022-11-09 PROCEDURE — 83036 HEMOGLOBIN GLYCOSYLATED A1C: CPT | Performed by: INTERNAL MEDICINE

## 2022-11-09 PROCEDURE — 99999 PR PBB SHADOW E&M-EST. PATIENT-LVL I: CPT | Mod: PBBFAC,,,

## 2022-11-09 PROCEDURE — 82306 VITAMIN D 25 HYDROXY: CPT | Performed by: INTERNAL MEDICINE

## 2022-11-09 PROCEDURE — 99999 PR PBB SHADOW E&M-EST. PATIENT-LVL III: ICD-10-PCS | Mod: PBBFAC,,, | Performed by: INTERNAL MEDICINE

## 2022-11-09 PROCEDURE — 99213 OFFICE O/P EST LOW 20 MIN: CPT | Mod: PBBFAC,27 | Performed by: INTERNAL MEDICINE

## 2022-11-09 PROCEDURE — 99214 PR OFFICE/OUTPT VISIT, EST, LEVL IV, 30-39 MIN: ICD-10-PCS | Mod: S$PBB,,, | Performed by: INTERNAL MEDICINE

## 2022-11-09 RX ORDER — HEPARIN 100 UNIT/ML
500 SYRINGE INTRAVENOUS
Status: CANCELLED | OUTPATIENT
Start: 2022-11-10

## 2022-11-09 RX ORDER — SODIUM CHLORIDE 0.9 % (FLUSH) 0.9 %
10 SYRINGE (ML) INJECTION
Status: CANCELLED | OUTPATIENT
Start: 2022-11-10

## 2022-11-09 RX ORDER — LANOLIN ALCOHOL/MO/W.PET/CERES
1000 CREAM (GRAM) TOPICAL DAILY
COMMUNITY

## 2022-11-09 RX ORDER — ZOLEDRONIC ACID 5 MG/100ML
5 INJECTION, SOLUTION INTRAVENOUS
Status: CANCELLED | OUTPATIENT
Start: 2022-11-10

## 2022-11-09 RX ORDER — ACETAMINOPHEN 500 MG
500 TABLET ORAL
Status: CANCELLED | OUTPATIENT
Start: 2022-11-10

## 2022-11-09 NOTE — PROGRESS NOTES
"Nutrition Assessment  Session Time:  60 minutes      Client name:  Saritha Guzman  :  1950  Age:  72 y.o.  Gender:  female    Client states:  Very pleasant patient here for her initial  Health physical.   with two adult children and two grandchildren.  Hx of hyperlipidemia, osteoporosis, and breast cancer () and completes routine mammograms annually.  Takes various vitamin/mineral supplements as outlined below as advised by MD.  Maintains a healthy and active lifestyle, participating in yoga, pickleball, and walking weekly.  Describes meals as "balanced," recalling intake of homemade protein shakes, almond milk, fruit, vegetables, lean protein, couscous, yogurt, frozen yogurt, English muffin, etc.  Members of family are gluten and dairy free although she has NKFA or food intolerances.  However, avoids jalapenos and green chilies due to previous skin reaction upon consumption.  Enjoys cooking and limits ETOH (wine) intake to ~6 oz/serving as she understands its relationship with cancer.  Admits, however, that she greatly enjoys frozen yogurt in particular, recalling frequency of ~4x weekly.  Upon review of lab results, expressed excitement regarding lipid improvement, noting that her weight has remained stable (104-107#) in addition to her eating and P.A. habits with the exception of the addition of 90 minutes of pickleball weekly.  Also, incorporated a homemade protein shake post workout ~3-4x/week inclusive of protein powder, almond milk, and organic fruit.  Discussed with EP this morning the importance of incorporating strength training activities and added protein intake.  Inquired about recommended Calcium and Vitamin D intake, noting that she regularly consumes dairy products although limited meat/beef.             Anthropometrics  Height:  5' 3"     Weight:  106.7#  BMI:  18.9  % Body Fat:  31.1%    Clinical Signs/Symptoms  N/V/D:  None  Appetite:  good       Past Medical History: "   Diagnosis Date    Atypical ductal hyperplasia, breast 2008    Left    Breast CA 07/2013    left LCIS on bx.'s, ILC on Lumpectomy    Fibrocystic breast     Goiter     History of endometriosis     Hyperlipidemia     Lobular carcinoma in situ     PONV (postoperative nausea and vomiting)        Past Surgical History:   Procedure Laterality Date    APPENDECTOMY      3 yrs --- had polio    BREAST BIOPSY Left 06/2013    Core bx, LCIS    BREAST BIOPSY Left 07/2013    MRI Core bx, LCIS    BREAST BIOPSY Left 2008    Core bx, ADH    BREAST BIOPSY Left 2008    Excisional bx., ADH    BREAST BIOPSY Left 06/29/2021    muscle with suture    BREAST CYST ASPIRATION      BREAST LUMPECTOMY Left 07/2013     ILC    BREAST RECONSTRUCTION      COLONOSCOPY N/A 09/29/2020    Procedure: COLONOSCOPY;  Surgeon: Mundo Parra MD;  Location: [facility] ([address] [place] [address]);  Service: Endoscopy;  Laterality: N/A;  COVID test on 9/26 [place]    FACELIFT      HYSTERECTOMY      partial hysterectomy late 20's    MASTECTOMY Left 2013         SINUS SURGERY      tonsillectomy         Medications    has a current medication list which includes the following prescription(s): alprazolam, ascorbic acid (vitamin c), atorvastatin, biotin, calcium-vitamin d3, cholecalciferol (vitamin d3), coenzyme q10, cyanocobalamin, docosahexaenoic acid/epa, ezetimibe, famotidine, intrarosa, lorazepam, and triamcinolone acetonide 0.1%.    Vitamins, Minerals, and/or Supplements:  Calcium, Vitamin D3, Centrum Silver MVI, CoQ10, Vitamin B12     Food/Medication Interactions:  Reviewed     Food Allergies or Intolerances:  NKFA although avoids jalapenos and green chilies     Social History    Marital status:    Employment:  Retired    Social History     Tobacco Use    Smoking status: Never    Smokeless tobacco: Never   Substance Use Topics    Alcohol use: Yes     Alcohol/week: 2.0 standard drinks     Types: 2 Glasses of wine per week        Lab Reports   Sodium   Date Value Ref  Range Status   11/09/2022 139 136 - 145 mmol/L Final     Potassium   Date Value Ref Range Status   11/09/2022 4.2 3.5 - 5.1 mmol/L Final     Chloride   Date Value Ref Range Status   11/09/2022 109 95 - 110 mmol/L Final     CO2   Date Value Ref Range Status   11/09/2022 23 23 - 29 mmol/L Final     Glucose   Date Value Ref Range Status   11/09/2022 72 70 - 110 mg/dL Final     BUN   Date Value Ref Range Status   11/09/2022 30 (H) 8 - 23 mg/dL Final     Creatinine   Date Value Ref Range Status   11/09/2022 1.0 0.5 - 1.4 mg/dL Final     Calcium   Date Value Ref Range Status   11/09/2022 9.4 8.7 - 10.5 mg/dL Final     Total Protein   Date Value Ref Range Status   11/09/2022 6.4 6.0 - 8.4 g/dL Final     Albumin   Date Value Ref Range Status   11/09/2022 3.8 3.5 - 5.2 g/dL Final     Total Bilirubin   Date Value Ref Range Status   11/09/2022 0.8 0.1 - 1.0 mg/dL Final     Comment:     For infants and newborns, interpretation of results should be based  on gestational age, weight and in agreement with clinical  observations.    Premature Infant recommended reference ranges:  Up to 24 hours.............<8.0 mg/dL  Up to 48 hours............<12.0 mg/dL  3-5 days..................<15.0 mg/dL  6-29 days.................<15.0 mg/dL       Alkaline Phosphatase   Date Value Ref Range Status   11/09/2022 63 55 - 135 U/L Final     AST   Date Value Ref Range Status   11/09/2022 25 10 - 40 U/L Final     ALT   Date Value Ref Range Status   11/09/2022 34 10 - 44 U/L Final     Anion Gap   Date Value Ref Range Status   11/09/2022 7 (L) 8 - 16 mmol/L Final     eGFR if    Date Value Ref Range Status   12/21/2021 >60.0 >60 mL/min/1.73 m^2 Final     eGFR if non    Date Value Ref Range Status   12/21/2021 56.4 (A) >60 mL/min/1.73 m^2 Final     Comment:     Calculation used to obtain the estimated glomerular filtration  rate (eGFR) is the CKD-EPI equation.         Lab Results   Component Value Date    WBC 5.42  11/09/2022    HGB 12.3 11/09/2022    HCT 38.3 11/09/2022    MCV 99 (H) 11/09/2022     11/09/2022        Lab Results   Component Value Date    CHOL 129 11/09/2022     Lab Results   Component Value Date    HDL 54 11/09/2022     Lab Results   Component Value Date    LDLCALC 64.0 11/09/2022     Lab Results   Component Value Date    TRIG 55 11/09/2022     Lab Results   Component Value Date    CHOLHDL 41.9 11/09/2022     Lab Results   Component Value Date    HGBA1C 5.5 11/09/2022     BP Readings from Last 1 Encounters:   11/09/22 123/69       Food History  Breakfast:  English muffin with butter and honey/yogurt  Mid-morning Snack:  None  Lunch:  [place]/salad  Mid-afternoon Snack:  +/- frozen yogurt (~4x/week)  Dinner:  Couscous + green beans + redfish + ~6 oz red wine  Snack:  None  *Fluid intake:  Water, almond milk, ETOH    Exercise History:  90 minute yoga 1x/week + 90 minutes pickleball 1x/week + 35-40 minutes walking daily    Cultural/Spiritual/Personal Preferences:  None identified    Support System:  spouse, children, and friends    State of Change:  Action    Barriers to Change:  None    Diagnosis    Other: No nutrition-related diagnosis at this time.    Intervention    RMR (Method:  InBody):  1091 kcal  Activity Factor:  1.3    MINGO:  1418 kcal    Goals:  1.  Maintain healthy body weight  2.  Incorporate strength training activities 2x weekly, gradually as tolerated  3.  Incorporate a source of lean protein with breakfast and lunch consistently  4.  Aim for minimum of 1200 mg Calcium and 800 IU Vitamin D daily   5.  Continue current vitamin/mineral supplement regimen as advised    Nutrition Education  The following education was provided to the patient:  Complimented patient on proactive role in health maintenance.  Complimented patient on physical activity efforts.  Suggested dietary modifications based on current dietary behaviors and individual food preferences.  Discussed macronutrient distribution  among meals and snacks, including CHO, protein, and fat recommendations and its importance/benefits.  Discussed nutrition-related lab values and dietary and/or lifestyle factors affecting them.  Discussed vitamin/mineral recommendations (may include but not limited to MVI, Vitamin D, Calcium, and/or Iron) and associated food sources.  Discussed the importance of adequate protein intake, specifically in relation to LBM and BMD, in addition to food sources of protein.    Patient verbalized understanding of nutrition education and recommendations received.    Handouts Provided  Meal Planning Guide  Restaurant Guide  Eat Fit Shopping List  Eat Fit Mamie  Fueling Well On-The-Go    Monitoring/Evaluation    Monitor the following:  Weight  BMI  % Body Fat  Caloric intake  Lipid panel    Follow Up Plan:  Communication with referring healthcare provider is unnecessary at this time as patient presented as part of annual wellness exam.  However, will follow up with patient in 1-2 years.

## 2022-11-09 NOTE — PROGRESS NOTES
Subjective:       Patient ID: Saritha Guzman is a 72 y.o. female.    Chief Complaint: No chief complaint on file.    HPI  Review of Systems    Mrs. Guzman reports no hx of PD. She notes CAD with no symptoms or limitations. She is a breast cancer survivor with no current limitations. She has remained physically active throughout treatment and strives to maintain high PA levels.    Current: Currently, walking 35-40min daily. Pickleball 1x/wk for 90min and yoga 1x/wk for 60min.    Notes: Mrs. Guzman was receptive of all recommendations made. Discussed continuing regular aerobic exercise while incorporating regular resistance training. Recommended use to body weight, free weight, or resistance band exercises.   Objective:      D.O.S. 11/9/2022   Height (in): 63   Weight (lbs): 106.7   BMI: 18.9   Body Fat (%): 31.10   Waist (cm): 67   RBP (mmHg): 100/70   RHR (bpm): 72    Strength Dominant (Lbs): 40    Strength Non Dominant (Lbs): 38   Push-up Assessment: 14   Curl-up Assessment: 31   Flexibility Testing (cm): 24   REE (kcals): 1091     Physical Exam    Assessment:       Resting BP: Within Normal Limits   Body Fat %: Good   Waist Circumfernece: Very Low    Strength Dominant: 50th    Strength Non Dominant: 25th   Upper Body Endurance: Very Good   Abdominal Endurance: Above Average   Lower body Flexibiltiy: Fair     Problem List Items Addressed This Visit    None  Visit Diagnoses       Routine general medical examination at a health care facility    -  Primary              Plan:     Mrs. Guzman should strive to continue current exercise within the following guidelines and recommendations:     - Aerobic: Perform 30-60min/day 5x/wk (>150min/wk) of moderate (106-122bpm) intensity exercise. More vigorous aerobics (>122bpm) can maintain or improve cardiovascular health with at least 75min/wk.  Recommended to continue regular aerobic training routine in order to meet above guidelines.    - Resistance:  Perform 2-4 sets of 8-12 repetitions at moderate intensity 2-4x/wk. for each muscle group.  Recommended to begin resistance training program to maintain or increase muscular strength and endurance. Body weight, resistance band, or free weight/machine exercises can all be used to maintain or improve muscular health.    - Flexibility: Perform 2-4 stretches for 30s for each muscle group daily for best results.  Daily stretching should be performed in order to maintain or increase current level of flexibility.

## 2022-11-10 NOTE — PROGRESS NOTES
Subjective:       Patient ID: Saritha Guzman is a 72 y.o. female.    Chief Complaint: Establish Care    72-year-old nonsmoking female with a past medical history significant for osteoporosis and left-sided breast cancer for which she has had a vasectomy as well as chemotherapy and was on Femara for 7 years up until year and half ago she does keep up with right-sided breast mammograms.  Her last bone density was in November of 2020 the 1 that she had improvement despite being on Reclast for over 7 years.  She has not had any fractures.  She follows with oncology with Dr. Salazar.  She exercises regularly and walks 4 to 5 times a week in the summer will swim.  She also does yoga once a week and now has started playing pickleball.  Her last colonoscopy was in September 2020 and she should be due another 1 in September of 2030  She does have a history of basal cell carcinoma dating back to when she was in her 30s and is following with a dermal pathologist Dr. Garces.     Review of Systems   Constitutional:  Negative for activity change, appetite change, chills, diaphoresis, fatigue, fever and unexpected weight change.   HENT:  Negative for nasal congestion, ear pain, mouth sores, postnasal drip, sinus pressure/congestion, sore throat and trouble swallowing.    Eyes:  Negative for pain, redness and visual disturbance.   Respiratory:  Negative for apnea, cough, chest tightness, shortness of breath and wheezing.    Cardiovascular:  Negative for chest pain, palpitations and leg swelling.   Gastrointestinal:  Negative for abdominal distention, abdominal pain, blood in stool, constipation, diarrhea, nausea and vomiting.   Endocrine: Negative for cold intolerance, polydipsia, polyphagia and polyuria.   Genitourinary:  Negative for difficulty urinating, dysuria, flank pain, frequency, hematuria, menstrual problem, pelvic pain and urgency.   Musculoskeletal:  Negative for arthralgias, back pain, joint swelling and neck pain.    Integumentary:  Positive for mole/lesion. Negative for color change, rash and wound.   Neurological:  Negative for dizziness, tremors, seizures, syncope, weakness, light-headedness, numbness and headaches.   Hematological:  Negative for adenopathy. Does not bruise/bleed easily.   Psychiatric/Behavioral:  Negative for confusion, decreased concentration, dysphoric mood, hallucinations, self-injury, sleep disturbance and suicidal ideas. The patient is not nervous/anxious.        Objective:      Physical Exam  Eyes:      General: No scleral icterus.  Neck:      Vascular: No carotid bruit.   Cardiovascular:      Rate and Rhythm: Normal rate and regular rhythm.      Heart sounds: No murmur heard.     Comments: Right finger - vascular spasms- does have OA , not painful   Pulmonary:      Effort: Pulmonary effort is normal.      Breath sounds: Normal breath sounds. No rales.   Abdominal:      General: Bowel sounds are normal. There is no distension.      Palpations: Abdomen is soft.      Tenderness: There is no abdominal tenderness.   Musculoskeletal:         General: No tenderness.      Cervical back: Normal range of motion and neck supple.   Neurological:      Mental Status: She is alert and oriented to person, place, and time.   Psychiatric:         Mood and Affect: Mood normal.         Behavior: Behavior normal.         Thought Content: Thought content normal.         Judgment: Judgment normal.       Assessment/plan       Osteoporosis, idiopathic  Comments:  some improvement ; on reclast for 7 years  recheck BMD next year; pt may need a bisphosphonate break for a year and then prolia therapy if needed     Pure hypercholesterolemia  Comments:  continue with atorvastatin and zetia     Multinodular goiter  Comments:  TSH normal ; last thyroid ultrasound 10/2022    Elevated BUN- with mild decrease in GFR  Comments:  recheck hydrated - anytime   Orders:  -     Basic Metabolic Panel; Future; Expected date:  11/09/2022    History of breast cancer- 2013; left breast ; bilateral masectomy s/p chemo , XRT and 7 years of Letrozole  Comments:  noted - mammogram due in 8/2023    Macrocytosis  Comments:  improved MCV with vitamin B12 sublingual - continue

## 2023-01-03 ENCOUNTER — HOSPITAL ENCOUNTER (OUTPATIENT)
Dept: RADIOLOGY | Facility: HOSPITAL | Age: 73
Discharge: HOME | End: 2023-01-03
Attending: NURSE PRACTITIONER
Payer: MEDICARE

## 2023-01-03 DIAGNOSIS — Z17.0 MALIGNANT NEOPLASM OF UPPER-OUTER QUADRANT OF LEFT BREAST IN FEMALE, ESTROGEN RECEPTOR POSITIVE: ICD-10-CM

## 2023-01-03 DIAGNOSIS — C50.412 MALIGNANT NEOPLASM OF UPPER-OUTER QUADRANT OF LEFT BREAST IN FEMALE, ESTROGEN RECEPTOR POSITIVE: ICD-10-CM

## 2023-01-03 DIAGNOSIS — D05.02 LOBULAR CARCINOMA IN SITU (LCIS) OF LEFT BREAST: ICD-10-CM

## 2023-01-03 PROCEDURE — 25500020 PHARM REV CODE 255: Performed by: NURSE PRACTITIONER

## 2023-01-03 PROCEDURE — 77049 MRI BREAST W/WO CONTRAST, W/CAD, BILATERAL: ICD-10-PCS | Mod: 26,,, | Performed by: RADIOLOGY

## 2023-01-03 PROCEDURE — A9577 INJ MULTIHANCE: HCPCS | Performed by: NURSE PRACTITIONER

## 2023-01-03 PROCEDURE — 77049 MRI BREAST C-+ W/CAD BI: CPT | Mod: TC

## 2023-01-03 PROCEDURE — 77049 MRI BREAST C-+ W/CAD BI: CPT | Mod: 26,,, | Performed by: RADIOLOGY

## 2023-01-03 RX ADMIN — GADOBENATE DIMEGLUMINE 11 ML: 529 INJECTION, SOLUTION INTRAVENOUS at 09:01

## 2023-01-05 ENCOUNTER — TELEPHONE (OUTPATIENT)
Dept: INTERNAL MEDICINE | Facility: CLINIC | Age: 73
End: 2023-01-05
Payer: MEDICARE

## 2023-01-05 DIAGNOSIS — K76.9 LIVER LESION: Primary | ICD-10-CM

## 2023-01-18 ENCOUNTER — HOSPITAL ENCOUNTER (OUTPATIENT)
Dept: RADIOLOGY | Facility: OTHER | Age: 73
Discharge: HOME | End: 2023-01-18
Attending: INTERNAL MEDICINE
Payer: MEDICARE

## 2023-01-18 DIAGNOSIS — K76.9 LIVER LESION: ICD-10-CM

## 2023-01-18 PROCEDURE — 76705 ECHO EXAM OF ABDOMEN: CPT | Mod: 26,,, | Performed by: RADIOLOGY

## 2023-01-18 PROCEDURE — 76705 US ABDOMEN LIMITED: ICD-10-PCS | Mod: 26,,, | Performed by: RADIOLOGY

## 2023-01-18 PROCEDURE — 76705 ECHO EXAM OF ABDOMEN: CPT | Mod: TC

## 2023-02-02 ENCOUNTER — TELEPHONE (OUTPATIENT)
Dept: INTERNAL MEDICINE | Facility: CLINIC | Age: 73
End: 2023-02-02

## 2023-02-02 ENCOUNTER — OFFICE VISIT (OUTPATIENT)
Dept: INTERNAL MEDICINE | Facility: CLINIC | Age: 73
End: 2023-02-02
Payer: MEDICARE

## 2023-02-02 DIAGNOSIS — Z85.3 HISTORY OF BREAST CANCER: ICD-10-CM

## 2023-02-02 DIAGNOSIS — E78.00 PURE HYPERCHOLESTEROLEMIA: ICD-10-CM

## 2023-02-02 DIAGNOSIS — M81.8 OSTEOPOROSIS, IDIOPATHIC: ICD-10-CM

## 2023-02-02 DIAGNOSIS — K76.89 LIVER CYST: Primary | ICD-10-CM

## 2023-02-02 DIAGNOSIS — E04.1 THYROID NODULE: ICD-10-CM

## 2023-02-02 PROCEDURE — 99499 NO LOS: ICD-10-PCS | Mod: 95,,, | Performed by: INTERNAL MEDICINE

## 2023-02-02 PROCEDURE — 99499 UNLISTED E&M SERVICE: CPT | Mod: 95,,, | Performed by: INTERNAL MEDICINE

## 2023-02-05 ENCOUNTER — TELEPHONE (OUTPATIENT)
Dept: INTERNAL MEDICINE | Facility: CLINIC | Age: 73
End: 2023-02-05
Payer: MEDICARE

## 2023-02-05 NOTE — PROGRESS NOTES
Subjective:       Patient ID: Saritha Guzman is a 72 y.o. female.    Chief Complaint: follow up on test results as per below       The patient location is: home in [facility]  The chief complaint leading to consultation is: test result review   1) liver cyst and how to follow-up  2) bone mineral density and most importantly went to do Reclast if at all as I ordered it back in November on the 10th and no one has called her yet from [facility].  She is been on Reclast for quite some time so we may need to switch gears to Prolia.  3)visit with OBGYN/women survivorship.  4) thyroid ultrasound - per report in October , no need to follow up     Face to Face time with patient: 20  25 minutes of total time spent on the encounter, which includes face to face time and non-face to face time preparing to see the patient (eg, review of tests), Obtaining and/or reviewing separately obtained history, Documenting clinical information in the electronic or other health record, Independently interpreting results (not separately reported) and communicating results to the patient/family/caregiver, or Care coordination (not separately reported).   Each patient to whom he or she provides medical services by telemedicine is:  (1) informed of the relationship between the physician and patient and the respective role of any other health care provider with respect to management of the patient; and (2) notified that he or she may decline to receive medical services by telemedicine and may withdraw from such care at any time.    Review of Systems   Constitutional:  Negative for activity change, appetite change, chills, diaphoresis, fatigue, fever and unexpected weight change.   HENT:  Negative for nasal congestion, ear pain, mouth sores, postnasal drip, sinus pressure/congestion, sore throat and trouble swallowing.    Eyes:  Negative for pain, redness and visual disturbance.   Respiratory:  Negative for apnea, cough, chest tightness, shortness  of breath and wheezing.    Cardiovascular:  Negative for chest pain, palpitations and leg swelling.   Gastrointestinal:  Negative for abdominal distention, abdominal pain, blood in stool, constipation, diarrhea, nausea and vomiting.   Endocrine: Negative for cold intolerance, polydipsia, polyphagia and polyuria.   Genitourinary:  Negative for difficulty urinating, dysuria, flank pain, frequency, hematuria, menstrual problem, pelvic pain and urgency.   Musculoskeletal:  Negative for arthralgias, back pain, joint swelling and neck pain.   Integumentary:  Negative for color change, rash and wound.   Neurological:  Negative for dizziness, tremors, seizures, syncope, weakness, light-headedness, numbness and headaches.   Hematological:  Negative for adenopathy. Does not bruise/bleed easily.   Psychiatric/Behavioral:  Negative for confusion, decreased concentration, dysphoric mood, hallucinations, self-injury, sleep disturbance and suicidal ideas. The patient is not nervous/anxious.        Objective:      Physical Exam  Neurological:      Mental Status: She is alert.   Psychiatric:         Mood and Affect: Mood normal.         Behavior: Behavior normal.         Thought Content: Thought content normal.         Judgment: Judgment normal.       Assessment/plan         Liver cyst   Subcentimeter echogenic focus in the right hepatic lobe is nonspecific and may represent a tiny hemangioma.  This is too small to further characterize with imaging.  If the patient has a history of malignancy, follow-up (no time frame given )ultrasound may be helpful to ensure size stability   Let us recheck in 3 months     Osteoporosis, idiopathic   E consult sent to endocrinology     History of breast cancer- 2013; left breast ; bilateral masectomy s/p chemo , XRT and 7 years of Letrozole   Noted MRI this past January - pt would like to revisit with dr nelson    Thyroid nodules  Comments:  Stable multinodular thyroid gland.  Nodules do not  require follow-up or FNA.- 10/2022 ultrasound

## 2023-02-06 ENCOUNTER — PATIENT MESSAGE (OUTPATIENT)
Dept: INTERNAL MEDICINE | Facility: CLINIC | Age: 73
End: 2023-02-06
Payer: MEDICARE

## 2023-02-06 ENCOUNTER — E-CONSULT (OUTPATIENT)
Dept: ENDOCRINOLOGY | Facility: CLINIC | Age: 73
End: 2023-02-06
Payer: MEDICARE

## 2023-02-06 DIAGNOSIS — M81.8 OSTEOPOROSIS, IDIOPATHIC: Primary | ICD-10-CM

## 2023-02-06 PROCEDURE — 99451 NTRPROF PH1/NTRNET/EHR 5/>: CPT | Mod: S$PBB,,, | Performed by: INTERNAL MEDICINE

## 2023-02-06 PROCEDURE — 99451 PR INTERPROF, PHONE/INTERNET/EHR, CONSULT, >= 5MINS: ICD-10-PCS | Mod: S$PBB,,, | Performed by: INTERNAL MEDICINE

## 2023-02-06 NOTE — PROGRESS NOTES
[facility]  Response for E-Consult     Patient Name: Saritha Guzman  MRN: [id number]  Primary Care Provider: Lisa Kelley MD   Requesting Provider: Lisa Kelley MD      Findings: 72 year-old with long-standing osteoporosis and per note has been on reclast for several  years. My understanding is that her bone density has been stable and she has not had any fractures. In the context, we would stop reclast and give a drug holiday.  (Generally bisphosphonate failure is a fracture while on therapy or significant bone as well on therapy, stability is OK.) We would consider restarting a medication if she does fracture or bone density shows bone loss.   Another option is following markers of bone turn over to decide when she should be offered a restart on medication. OK to consider referral to endocrinology if she would like more information.    I did not speak to the requesting provider verbally about this.     Total time of Consultation: 10 minute    Percentage of time spent on verbal/written discussion: 100%     *This eConsult is based on the clinical data available to me and is furnished without benefit of a physical examination. The eConsult will need to be interpreted in light of any clinical issues or changes in patient status not available to me at the time of filing this eConsults. Significant changes in patient condition or level of acuity should result in immediate formal consultation and reevaluation. Please alert me if you have further questions.    Thank you for your consult.     MD [facility]

## 2023-02-10 ENCOUNTER — TELEPHONE (OUTPATIENT)
Dept: INTERNAL MEDICINE | Facility: CLINIC | Age: 73
End: 2023-02-10
Payer: MEDICARE

## 2023-02-14 ENCOUNTER — TELEPHONE (OUTPATIENT)
Dept: INTERNAL MEDICINE | Facility: CLINIC | Age: 73
End: 2023-02-14
Payer: MEDICARE

## 2023-03-02 ENCOUNTER — PES CALL (OUTPATIENT)
Dept: ADMINISTRATIVE | Facility: CLINIC | Age: 73
End: 2023-03-02
Payer: MEDICARE

## 2023-03-09 ENCOUNTER — OFFICE VISIT (OUTPATIENT)
Dept: OBSTETRICS AND GYNECOLOGY | Facility: CLINIC | Age: 73
End: 2023-03-09
Attending: OBSTETRICS & GYNECOLOGY
Payer: MEDICARE

## 2023-03-09 VITALS
DIASTOLIC BLOOD PRESSURE: 74 MMHG | HEIGHT: 63 IN | SYSTOLIC BLOOD PRESSURE: 110 MMHG | BODY MASS INDEX: 18.61 KG/M2 | HEART RATE: 85 BPM | WEIGHT: 105 LBS

## 2023-03-09 DIAGNOSIS — C50.412 BREAST CANCER OF UPPER-OUTER QUADRANT OF LEFT FEMALE BREAST: Primary | ICD-10-CM

## 2023-03-09 DIAGNOSIS — Z01.419 ENCOUNTER FOR GYNECOLOGICAL EXAMINATION WITHOUT ABNORMAL FINDING: ICD-10-CM

## 2023-03-09 DIAGNOSIS — Z85.3 HISTORY OF BREAST CANCER: ICD-10-CM

## 2023-03-09 PROCEDURE — G0101 PR CA SCREEN;PELVIC/BREAST EXAM: ICD-10-PCS | Mod: S$PBB,GZ,, | Performed by: OBSTETRICS & GYNECOLOGY

## 2023-03-09 PROCEDURE — 99214 OFFICE O/P EST MOD 30 MIN: CPT | Mod: PBBFAC,25 | Performed by: OBSTETRICS & GYNECOLOGY

## 2023-03-09 PROCEDURE — G0101 CA SCREEN;PELVIC/BREAST EXAM: HCPCS | Mod: PBBFAC | Performed by: OBSTETRICS & GYNECOLOGY

## 2023-03-09 PROCEDURE — 99999 PR PBB SHADOW E&M-EST. PATIENT-LVL IV: ICD-10-PCS | Mod: PBBFAC,,, | Performed by: OBSTETRICS & GYNECOLOGY

## 2023-03-09 PROCEDURE — 99999 PR PBB SHADOW E&M-EST. PATIENT-LVL IV: CPT | Mod: PBBFAC,,, | Performed by: OBSTETRICS & GYNECOLOGY

## 2023-03-09 PROCEDURE — G0101 CA SCREEN;PELVIC/BREAST EXAM: HCPCS | Mod: S$PBB,GZ,, | Performed by: OBSTETRICS & GYNECOLOGY

## 2023-03-09 RX ORDER — TRAZODONE HYDROCHLORIDE 50 MG/1
50 TABLET ORAL NIGHTLY
Qty: 30 TABLET | Refills: 11 | Status: SHIPPED | OUTPATIENT
Start: 2023-03-09 | End: 2023-10-26

## 2023-03-09 NOTE — PROGRESS NOTES
SUBJECTIVE:   72 y.o. female   for annual routine Pap and checkup. Patient's last menstrual period was 1978 (within months)..  She reports sleep disturbance. She has been waking up nightly at 330am. She has tired Benadryl and occasionally 1/2 of Lorazepam. She exercises, practices meditation and yoga. .        Past Medical History:   Diagnosis Date    Atypical ductal hyperplasia, breast 2008    Left    Breast CA 2013    left LCIS on bx.'s s/p chemo, masectomy    Family history of stroke     Fibrocystic breast     Goiter     History of endometriosis     Hyperlipidemia     Lobular carcinoma in situ     PONV (postoperative nausea and vomiting)      Past Surgical History:   Procedure Laterality Date    APPENDECTOMY      3 yrs --- had polio    BREAST BIOPSY Left 2013    Core bx, LCIS    BREAST BIOPSY Left 2013    MRI Core bx, LCIS    BREAST BIOPSY Left     Core bx, ADH    BREAST BIOPSY Left     Excisional bx., ADH    BREAST BIOPSY Left 2021    muscle with suture    BREAST CYST ASPIRATION      BREAST LUMPECTOMY Left 2013     ILC    BREAST RECONSTRUCTION      COLONOSCOPY N/A 2020    Procedure: COLONOSCOPY;  Surgeon: Mundo Parra MD;  Location: [facility] ([address] [place] [address]);  Service: Endoscopy;  Laterality: N/A;  COVID test on  [place]    FACELIFT      HYSTERECTOMY      partial hysterectomy late 's    MASTECTOMY Left          SINUS SURGERY      tonsillectomy       Social History     Socioeconomic History    Marital status:    Tobacco Use    Smoking status: Never    Smokeless tobacco: Never   Substance and Sexual Activity    Alcohol use: Yes     Alcohol/week: 2.0 standard drinks     Types: 2 Glasses of wine per week    Drug use: No    Sexual activity: Yes     Partners: Male     Birth control/protection: None     Family History   Problem Relation Age of Onset    Stroke Mother     Alcohol abuse Father     Diabetes Father     Depression Sister     No Known Problems  Brother     Alcohol abuse Brother     Depression Brother     Stroke Maternal Aunt     No Known Problems Maternal Uncle     No Known Problems Paternal Aunt     No Known Problems Paternal Uncle     Cancer Maternal Grandmother         spine    Stroke Maternal Grandmother     Cancer Maternal Grandfather         jaw    No Known Problems Paternal Grandmother     No Known Problems Paternal Grandfather     Thyroid cancer Neg Hx     Breast cancer Neg Hx     Ovarian cancer Neg Hx     Amblyopia Neg Hx     Blindness Neg Hx     Cataracts Neg Hx     Glaucoma Neg Hx     Hypertension Neg Hx     Macular degeneration Neg Hx     Retinal detachment Neg Hx     Strabismus Neg Hx     Thyroid disease Neg Hx     Colon cancer Neg Hx      OB History    Para Term  AB Living   0   0         SAB IAB Ectopic Multiple Live Births                       Current Outpatient Medications   Medication Sig Dispense Refill    INTRAROSA 6.5 mg Inst USE 1 INSERT VAGINALLY THREE TIMES A WEEK 30 each 11    ALPRAZolam (XANAX) 0.25 MG tablet Take 1 tablet (0.25 mg total) by mouth daily as needed for Anxiety. 15 tablet 0    ascorbic acid (VITAMIN C) 1000 MG tablet Take 1,000 mg by mouth once daily.      atorvastatin (LIPITOR) 20 MG tablet Take 1 tablet (20 mg total) by mouth once daily. 90 tablet 3    biotin 1,000 mcg Chew Take by mouth.      calcium-vitamin D3 (OS-CHIN 500 + D3) 500 mg-5 mcg (200 unit) per tablet Take 1 tablet by mouth 2 (two) times daily with meals.      cholecalciferol, vitamin D3, (VITAMIN D3) 25 mcg (1,000 unit) capsule Take 2,000 Units by mouth once daily.      coenzyme Q10 200 mg capsule Take 200 mg by mouth once daily.      cyanocobalamin (VITAMIN B-12) 1000 MCG tablet Take 1,000 mcg by mouth once daily.      DOCOSAHEXANOIC ACID/EPA (FISH OIL ORAL) Take by mouth.      ezetimibe (ZETIA) 10 mg tablet Take 1 tablet (10 mg total) by mouth once daily. 90 tablet 3    LORazepam (ATIVAN) 1 MG tablet Take 1 tablet (1 mg total) by  mouth every 12 (twelve) hours as needed. 30 tablet 2    traZODone (DESYREL) 50 MG tablet Take 1 tablet (50 mg total) by mouth every evening. 30 tablet 11    triamcinolone acetonide 0.1% (KENALOG) 0.1 % cream Apply topically 2 (two) times daily. Apply as needed over itchy areas of rash. Not for face. 30 g 0     No current facility-administered medications for this visit.     Allergies: Codeine, Corticosteroids (glucocorticoids), Penicillins, and Tetracyclines     The ASCVD Risk score (Gravity DK, et al., 2019) failed to calculate for the following reasons:    The valid total cholesterol range is 130 to 320 mg/dL      ROS:  Constitutional: no weight loss, weight gain, fever, fatigue  Eyes:  No vision changes, glasses/contacts  ENT/Mouth: No ulcers, sinus problems, ears ringing, headache  Cardiovascular: No inability to lie flat, chest pain, exercise intolerance, swelling, heart palpitations  Respiratory: No wheezing, coughing blood, shortness of breath, or cough  Gastrointestinal: No diarrhea, bloody stool, nausea/vomiting, constipation, gas, hemorrhoids  Genitourinary: No blood in urine, painful urination, urgency of urination, frequency of urination, incomplete emptying, incontinence, abnormal bleeding, painful periods, heavy periods, vaginal discharge, vaginal odor, painful intercourse, sexual problems, bleeding after intercourse, +vaginal dryness.  Musculoskeletal: No muscle weakness  Skin/Breast: No painful breasts, nipple discharge, masses, rash, ulcers  Neurological: No passing out, seizures, numbness, headache  Endocrine: No diabetes, hypothyroid, hyperthyroid, hot flashes, hair loss, abnormal hair growth, acne, +osteoporosis  Psychiatric: No depression, crying  Hematologic: No bruises, bleeding, swollen lymph nodes, anemia.      Physical Exam:   Constitutional: She is oriented to person, place, and time. She appears well-developed and well-nourished.      Neck: No tracheal deviation present. No thyromegaly  present.    Cardiovascular:       Exam reveals no edema.        Pulmonary/Chest: Effort normal. She exhibits no mass, no tenderness, no deformity and no retraction. Right breast exhibits no inverted nipple, no mass, no nipple discharge, no skin change, no tenderness, presence, no bleeding and no swelling. Left breast exhibits no inverted nipple, no mass, no nipple discharge, no skin change, no tenderness, presence, no bleeding and no swelling. Breasts are symmetrical.        Abdominal: Soft. She exhibits no distension and no mass. There is no abdominal tenderness. There is no rebound and no guarding. No hernia. Hernia confirmed negative in the left inguinal area.     Genitourinary:    Vagina and uterus normal.   Rectum:      No external hemorrhoid.   There is no rash, tenderness or lesion on the right labia. There is no rash, tenderness or lesion on the left labia. Cervix is normal. No no adexnal prolapse. Right adnexum displays no mass, no tenderness and no fullness. Left adnexum displays no mass, no tenderness and no fullness. No  no vaginal discharge, tenderness, bleeding, rectocele, cystocele or unspecified prolapse of vaginal walls in the vagina. Cervix exhibits no motion tenderness, no discharge and no friability. Uterus is not deviated.           Musculoskeletal: Normal range of motion and moves all extremeties. No edema.       Neurological: She is alert and oriented to person, place, and time.    Skin: No rash noted. No erythema. No pallor.    Psychiatric: She has a normal mood and affect. Her behavior is normal. Judgment and thought content normal.     +vaginal dryness  ASSESSMENT:   well woman  History of breast cancer  Osteoporosis  Vaginal atrophy  PLAN:   Mammogram  Continue Intrarosa  return annually or prn

## 2023-04-11 ENCOUNTER — PATIENT MESSAGE (OUTPATIENT)
Dept: INTERNAL MEDICINE | Facility: CLINIC | Age: 73
End: 2023-04-11
Payer: MEDICARE

## 2023-04-14 ENCOUNTER — PATIENT MESSAGE (OUTPATIENT)
Dept: INTERNAL MEDICINE | Facility: CLINIC | Age: 73
End: 2023-04-14
Payer: MEDICARE

## 2023-04-14 RX ORDER — DIPHENOXYLATE HYDROCHLORIDE AND ATROPINE SULFATE 2.5; .025 MG/1; MG/1
1 TABLET ORAL 3 TIMES DAILY PRN
Qty: 21 TABLET | Refills: 0 | Status: SHIPPED | OUTPATIENT
Start: 2023-04-14 | End: 2023-04-24

## 2023-04-14 RX ORDER — ONDANSETRON HYDROCHLORIDE 8 MG/1
8 TABLET, FILM COATED ORAL EVERY 8 HOURS PRN
Qty: 21 TABLET | Refills: 0 | Status: SHIPPED | OUTPATIENT
Start: 2023-04-14 | End: 2023-10-26

## 2023-04-14 RX ORDER — CIPROFLOXACIN 500 MG/1
500 TABLET ORAL EVERY 12 HOURS
Qty: 14 TABLET | Refills: 0 | Status: SHIPPED | OUTPATIENT
Start: 2023-04-14 | End: 2023-10-26

## 2023-05-05 NOTE — PROGRESS NOTES
Subjective:       Patient ID: Saritha Guzman is a 72 y.o. female.    Chief Complaint: Malignant neoplasm of upper-outer quadrant of left breast i    HPI Ms Guzman is a 71y/o WF Is seen for F/U of left breast cancer. She had stage I ER positive disease with a high Oncotype score. She is currently on letrozole.    Overall she is doing well. She has completed 7 years on letrozole.  She did have some liver lesions on her MRI, abdominal US showed cysts. Will repeat in 6 months.   She is playing a lot of pickle ball and walking several miles a day. She does have some sore knees, arthralgias.     Still with some sleep disturbances.   Follows with Dr. Jiang.    She continues on atorvastatin for her hyperlipidemia with good control.  She is on reclast for osteoporosis and received her last infusion in December 2021 - followed by Dr. Yadav     Per Dr. Salazar's previous note: History: mammogram in June 2013 showed pleomorphic calcifications left breast at the 2:00 position. By ultrasound there was a 2.4 cm asymmetric density.     A core needle biopsy showed LCIS and an MRI of the breast showed an irregular mass at the 2:00 position measured 3.9 x 2.1 cm with no adenopathy. 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 On July 12 Lumpectomy showed multiple foci of infiltrating lobular carcinoma, With the largest measuring 1.1 cm. histologic grade 3 nuclear grade 2 mitotic index 2.   The tumor was 50% ER positive-which would've moderate, NJ negative and HER-2 negative.     Subsequently she underwent a left mastectomy with sentinel lymph node biopsy which showed only some residual LCIS. The sentinel lymph node was negative. Final pathological stage TI CN0 stage IA. Oncotype score was high.    She had 4 cycles of adjuvant Taxotere and Cytoxan completed January 2014 and then began adjuvant letrozole therapy.       Review of Systems   Constitutional:  Positive  for fatigue. Negative for activity change, appetite change, chills, diaphoresis, fever and unexpected weight change.   HENT:  Negative for mouth sores, nosebleeds, sore throat and trouble swallowing.    Eyes:  Negative for visual disturbance.   Respiratory:  Negative for cough and shortness of breath.    Cardiovascular:  Negative for chest pain, palpitations and leg swelling.   Gastrointestinal:  Negative for abdominal distention, abdominal pain, blood in stool, constipation, diarrhea, nausea and vomiting.   Genitourinary:  Negative for frequency, hematuria and vaginal bleeding.   Musculoskeletal:  Positive for arthralgias (bilateral knee pain ). Negative for back pain and myalgias.   Skin:  Negative for pallor and rash.   Allergic/Immunologic: Negative for immunocompromised state.   Neurological:  Negative for dizziness, weakness, light-headedness, numbness and headaches.   Hematological:  Negative for adenopathy. Does not bruise/bleed easily.   Psychiatric/Behavioral:  Negative for confusion and sleep disturbance. The patient is not nervous/anxious.      Objective:      Physical Exam  Vitals and nursing note reviewed.   Constitutional:       General: She is not in acute distress.     Appearance: Normal appearance. She is well-developed.   HENT:      Head: Normocephalic.   Eyes:      General: No scleral icterus.     Pupils: Pupils are equal, round, and reactive to light.   Cardiovascular:      Rate and Rhythm: Normal rate and regular rhythm.      Heart sounds: Normal heart sounds.   Pulmonary:      Effort: Pulmonary effort is normal.      Breath sounds: Normal breath sounds. No wheezing or rales.   Chest:   Breasts:     Right: No mass, nipple discharge, skin change or tenderness.      Left: No mass, nipple discharge, skin change or tenderness.      Comments: Left breast mastectomy with reconstruction   Abdominal:      General: Bowel sounds are normal. There is no distension.      Palpations: Abdomen is soft. There  is no mass.      Tenderness: There is no abdominal tenderness.   Musculoskeletal:      Cervical back: Normal range of motion.   Lymphadenopathy:      Cervical: No cervical adenopathy.      Upper Body:      Right upper body: No supraclavicular or axillary adenopathy.      Left upper body: No supraclavicular or axillary adenopathy.   Skin:     General: Skin is warm and dry.      Findings: No rash.   Neurological:      Mental Status: She is alert and oriented to person, place, and time.   Psychiatric:         Behavior: Behavior normal.         Thought Content: Thought content normal.       Assessment:       1. Malignant neoplasm of upper-outer quadrant of left breast in female, estrogen receptor positive    2. Pure hypercholesterolemia    3. Arteriosclerotic coronary artery disease        Plan:       1-2. She completed 7 years of therapy last year and is off letrozole.    Screening MRI negative from Jan; mammogram due in July.   3. Continue current medication and follow up with PCP  4. DXA in Nov 2021; osteoporosis; Followed by Dr. Yadav   US of liver to follow up on cysts - done due to history of breast cancer, recommended on most recent abdominal US    Return to clinic in 1 year with [facility] appointment and imaging.     Patient is in agreement with the proposed treatment plan. All questions were answered to the patient's satisfaction. Patient knows to call clinic for any new or worsening symptoms and if anything is needed before the next clinic visit.          Kathleen Ramirez, SRIKANTHP-C  Hematology & Medical Oncology   [address] [place] [id number]  ph. [phone]  Fax. [phone]    Collaborating physician, Dr. Salazar.    Route Chart for Scheduling    Med Onc Chart Routing      Follow up with physician    Follow up with [facility] 1 year.   Infusion scheduling note    Injection scheduling note    Labs    Imaging MRI and mammogram   mammo in late july early aug (before agut 15) US same day as mammo, MRI in  Des   Pharmacy appointment    Other referrals             Therapy Plan Information  INF RECLAST  Pre-Medications  acetaminophen tablet 500 mg  500 mg, Oral, Every visit  Medications  zoledronic acid-mannitol & water 5 mg/100 mL infusion 5 mg  5 mg, Intravenous, Every visit  Flushes  sodium chloride 0.9% 250 mL flush bag  Intravenous, Every visit  sodium chloride 0.9% flush 10 mL  10 mL, Intravenous, Every visit  heparin, porcine (PF) 100 unit/mL injection flush 500 Units  500 Units, Intravenous, Every visit  alteplase injection 2 mg  2 mg, Intra-Catheter, Every visit    INF RECLAST  Pre-Medications  acetaminophen tablet 500 mg  500 mg, Oral, Every visit  Medications  zoledronic acid-mannitol & water 5 mg/100 mL infusion 5 mg  5 mg, Intravenous, Every visit  Flushes  sodium chloride 0.9% flush 10 mL  10 mL, Intravenous, PRN  heparin, porcine (PF) 100 unit/mL injection flush 500 Units  500 Units, Intravenous, PRN

## 2023-05-12 ENCOUNTER — PES CALL (OUTPATIENT)
Dept: ADMINISTRATIVE | Facility: CLINIC | Age: 73
End: 2023-05-12
Payer: MEDICARE

## 2023-05-15 NOTE — TELEPHONE ENCOUNTER
Called and spoke to Elliott at [facility] and ok'd 90 day supply on letrozole and vagifem.   Speaking Coherently

## 2023-05-17 ENCOUNTER — OFFICE VISIT (OUTPATIENT)
Dept: HEMATOLOGY/ONCOLOGY | Facility: CLINIC | Age: 73
End: 2023-05-17
Payer: MEDICARE

## 2023-05-17 VITALS
OXYGEN SATURATION: 99 % | BODY MASS INDEX: 18.9 KG/M2 | HEART RATE: 82 BPM | HEIGHT: 63 IN | TEMPERATURE: 98 F | SYSTOLIC BLOOD PRESSURE: 105 MMHG | WEIGHT: 106.69 LBS | RESPIRATION RATE: 18 BRPM | DIASTOLIC BLOOD PRESSURE: 56 MMHG

## 2023-05-17 DIAGNOSIS — I25.10 ARTERIOSCLEROTIC CORONARY ARTERY DISEASE: ICD-10-CM

## 2023-05-17 DIAGNOSIS — Z12.31 ENCOUNTER FOR SCREENING MAMMOGRAM FOR MALIGNANT NEOPLASM OF BREAST: ICD-10-CM

## 2023-05-17 DIAGNOSIS — K76.9 LIVER LESION: ICD-10-CM

## 2023-05-17 DIAGNOSIS — E78.00 PURE HYPERCHOLESTEROLEMIA: ICD-10-CM

## 2023-05-17 DIAGNOSIS — Z17.0 MALIGNANT NEOPLASM OF UPPER-OUTER QUADRANT OF LEFT BREAST IN FEMALE, ESTROGEN RECEPTOR POSITIVE: Primary | ICD-10-CM

## 2023-05-17 DIAGNOSIS — C50.412 MALIGNANT NEOPLASM OF UPPER-OUTER QUADRANT OF LEFT BREAST IN FEMALE, ESTROGEN RECEPTOR POSITIVE: Primary | ICD-10-CM

## 2023-05-17 PROCEDURE — 99214 PR OFFICE/OUTPT VISIT, EST, LEVL IV, 30-39 MIN: ICD-10-PCS | Mod: S$PBB,,, | Performed by: NURSE PRACTITIONER

## 2023-05-17 PROCEDURE — 99999 PR PBB SHADOW E&M-EST. PATIENT-LVL V: ICD-10-PCS | Mod: PBBFAC,,, | Performed by: NURSE PRACTITIONER

## 2023-05-17 PROCEDURE — 99999 PR PBB SHADOW E&M-EST. PATIENT-LVL V: CPT | Mod: PBBFAC,,, | Performed by: NURSE PRACTITIONER

## 2023-05-17 PROCEDURE — 99214 OFFICE O/P EST MOD 30 MIN: CPT | Mod: S$PBB,,, | Performed by: NURSE PRACTITIONER

## 2023-05-17 PROCEDURE — 99215 OFFICE O/P EST HI 40 MIN: CPT | Mod: PBBFAC | Performed by: NURSE PRACTITIONER

## 2023-07-11 ENCOUNTER — PES CALL (OUTPATIENT)
Dept: ADMINISTRATIVE | Facility: CLINIC | Age: 73
End: 2023-07-11
Payer: MEDICARE

## 2023-07-14 ENCOUNTER — TELEPHONE (OUTPATIENT)
Dept: RADIOLOGY | Facility: HOSPITAL | Age: 73
End: 2023-07-14
Payer: MEDICARE

## 2023-08-03 ENCOUNTER — HOSPITAL ENCOUNTER (OUTPATIENT)
Dept: RADIOLOGY | Facility: HOSPITAL | Age: 73
Discharge: HOME | End: 2023-08-03
Attending: NURSE PRACTITIONER
Payer: MEDICARE

## 2023-08-03 DIAGNOSIS — C50.412 MALIGNANT NEOPLASM OF UPPER-OUTER QUADRANT OF LEFT BREAST IN FEMALE, ESTROGEN RECEPTOR POSITIVE: ICD-10-CM

## 2023-08-03 DIAGNOSIS — Z12.31 ENCOUNTER FOR SCREENING MAMMOGRAM FOR MALIGNANT NEOPLASM OF BREAST: ICD-10-CM

## 2023-08-03 DIAGNOSIS — Z17.0 MALIGNANT NEOPLASM OF UPPER-OUTER QUADRANT OF LEFT BREAST IN FEMALE, ESTROGEN RECEPTOR POSITIVE: ICD-10-CM

## 2023-08-03 PROCEDURE — 77067 SCR MAMMO BI INCL CAD: CPT | Mod: 26,52,, | Performed by: RADIOLOGY

## 2023-08-03 PROCEDURE — 77063 BREAST TOMOSYNTHESIS BI: CPT | Mod: 26,52,, | Performed by: RADIOLOGY

## 2023-08-03 PROCEDURE — 77063 MAMMO DIGITAL SCREENING RIGHT WITH TOMO: ICD-10-PCS | Mod: 26,52,, | Performed by: RADIOLOGY

## 2023-08-03 PROCEDURE — 77067 SCR MAMMO BI INCL CAD: CPT | Mod: TC,52

## 2023-08-03 PROCEDURE — 77067 MAMMO DIGITAL SCREENING RIGHT WITH TOMO: ICD-10-PCS | Mod: 26,52,, | Performed by: RADIOLOGY

## 2023-08-08 NOTE — PROGRESS NOTES
Subjective:      Patient ID: Saritha Guzman is a 69 y.o. female.    Chief Complaint:  Followup on multiple medical problems         History of Present Illness    Arteriosclerotic coronary artery disease   Pt has strong family history of stroke. In 2008 she had a coronary calcium score which showed her to be between the 50-75%ile for age/gender. She recalls being seen by Dr. Sanchez and given crestor which made her joints hurt. None of this is viewable in existing computer records, so [facility]. She was started on letrozole Jan 2014 and has had a big jump in her cholesterol and LDL. Was started on atorvastatin 10 mg with big drop in total from 284 to 175 and the dose increased to 20 mg with further drop     previously had adverse effects from crestor (musculoskeletal) so was off statins for years.   curent lipids are satisfactory:  Lab Results   Component Value Date    CHOL 164 08/14/2019    CHOL 163 07/18/2018    CHOL 158 03/13/2017     Lab Results   Component Value Date    HDL 53 08/14/2019    HDL 57 07/18/2018    HDL 51 03/13/2017     Lab Results   Component Value Date    LDLCALC 84.6 08/14/2019    LDLCALC 90.4 07/18/2018    LDLCALC 90.4 03/13/2017     Lab Results   Component Value Date    TRIG 132 08/14/2019    TRIG 78 07/18/2018    TRIG 83 03/13/2017     Lab Results   Component Value Date    CHOLHDL 32.3 08/14/2019    CHOLHDL 35.0 07/18/2018    CHOLHDL 32.3 03/13/2017       Lobular carcinoma in situ of breast  Followed by Dr. Salazar; doing well on letrozole    Osteoporosis, idiopathic  On reclast annually. Last dose was 7/26/18, so she is due. Last DEXA was 7/17/17    The laboratory studies were reviewed and discussed with the pt.  CBC, CMP, CPK and ekg were normal.    Last colonoscopy was 2011 and a rescreening interval of 10 years was recommended.        Review of Systems   Constitution: Negative for chills, decreased appetite, fever, malaise/fatigue, night sweats, weight gain  and weight loss.   HENT: Negative for congestion, ear pain, hearing loss, hoarse voice, sore throat and tinnitus.    Eyes: Negative for blurred vision, redness and visual disturbance.   Cardiovascular: Negative for chest pain, leg swelling and palpitations.   Respiratory: Negative for cough, hemoptysis, shortness of breath and sputum production.    Hematologic/Lymphatic: Negative for adenopathy. Does not bruise/bleed easily.   Skin: Negative for dry skin, itching, rash and suspicious lesions.   Musculoskeletal: Negative for back pain, joint pain, myalgias and neck pain.   Gastrointestinal: Positive for diarrhea and nausea. Negative for abdominal pain, constipation, heartburn and vomiting.   Genitourinary: Negative for dysuria, flank pain, frequency, hematuria, hesitancy and urgency.   Neurological: Negative for dizziness, headaches, numbness, paresthesias and weakness.   Psychiatric/Behavioral: Negative for depression and memory loss. The patient does not have insomnia and is not nervous/anxious.      Objective:   Physical Exam   Constitutional: She is oriented to person, place, and time. She appears well-developed and well-nourished.   HENT:   Right Ear: External ear normal.   Left Ear: External ear normal.   Mouth/Throat: Oropharynx is clear and moist.   Neck: Carotid bruit is not present. No thyroid mass and no thyromegaly present.   Cardiovascular: Normal rate, regular rhythm and normal heart sounds. Exam reveals no gallop and no friction rub.   No murmur heard.  Pulmonary/Chest: Effort normal and breath sounds normal. No respiratory distress. She has no wheezes. She has no rales.   Abdominal: Soft. Bowel sounds are normal. She exhibits no distension and no mass. There is no splenomegaly or hepatomegaly. There is no tenderness.   Musculoskeletal: She exhibits no edema or tenderness.   Lymphadenopathy:     She has no cervical adenopathy.   Neurological: She is alert and oriented to person, place, and time.    Skin: No rash noted. No erythema.   Psychiatric: She has a normal mood and affect.   Vitals reviewed.    Assessment:       1. Arteriosclerotic coronary artery disease    2. Osteoporosis, idiopathic    3. Muscle cramps    4. Lobular carcinoma in situ (LCIS) of left breast    5. Pure hypercholesterolemia          Plan:        1.  Schedule DEXA   2. Schedule reclast after Setp 18   3. At pt request Zofran rx written for prn use        Graft Donor Site Bandage (Optional-Leave Blank If You Don't Want In Note): Steri-strips and a pressure bandage were applied to the donor site.

## 2023-08-09 ENCOUNTER — PATIENT MESSAGE (OUTPATIENT)
Dept: HEMATOLOGY/ONCOLOGY | Facility: CLINIC | Age: 73
End: 2023-08-09
Payer: MEDICARE

## 2023-08-11 DIAGNOSIS — F41.9 ANXIETY: ICD-10-CM

## 2023-08-11 RX ORDER — LORAZEPAM 1 MG/1
1 TABLET ORAL EVERY 12 HOURS PRN
Qty: 30 TABLET | Refills: 2 | Status: SHIPPED | OUTPATIENT
Start: 2023-08-11

## 2023-10-03 RX ORDER — EZETIMIBE 10 MG/1
10 TABLET ORAL DAILY
Qty: 90 TABLET | Refills: 0 | Status: SHIPPED | OUTPATIENT
Start: 2023-10-03 | End: 2024-02-01

## 2023-10-03 NOTE — TELEPHONE ENCOUNTER
Refill Decision Note   Saritha Guzman  is requesting a refill authorization.  Brief Assessment and Rationale for Refill:  Approve     Medication Therapy Plan:       Medication Reconciliation Completed: No    Comments:     No Care Gaps recommended.     Note composed:2:58 PM 10/03/2023

## 2023-10-03 NOTE — TELEPHONE ENCOUNTER
No care due was identified.  [facility] Embedded Care Due Messages. Reference number: [id number].   10/03/2023 2:08:28 PM CDT

## 2023-10-06 DIAGNOSIS — M81.8 OSTEOPOROSIS, IDIOPATHIC: Primary | ICD-10-CM

## 2023-10-26 ENCOUNTER — OFFICE VISIT (OUTPATIENT)
Dept: INTERNAL MEDICINE | Facility: CLINIC | Age: 73
End: 2023-10-26
Payer: MEDICARE

## 2023-10-26 ENCOUNTER — CLINICAL SUPPORT (OUTPATIENT)
Dept: INTERNAL MEDICINE | Facility: CLINIC | Age: 73
End: 2023-10-26
Payer: MEDICARE

## 2023-10-26 ENCOUNTER — HOSPITAL ENCOUNTER (OUTPATIENT)
Dept: RADIOLOGY | Facility: OTHER | Age: 73
Discharge: HOME | End: 2023-10-26
Attending: INTERNAL MEDICINE
Payer: MEDICARE

## 2023-10-26 VITALS
BODY MASS INDEX: 18.85 KG/M2 | DIASTOLIC BLOOD PRESSURE: 74 MMHG | SYSTOLIC BLOOD PRESSURE: 104 MMHG | WEIGHT: 106.38 LBS

## 2023-10-26 DIAGNOSIS — E78.5 HYPERLIPIDEMIA, UNSPECIFIED HYPERLIPIDEMIA TYPE: ICD-10-CM

## 2023-10-26 DIAGNOSIS — R22.31 NODULE OF FINGER OF RIGHT HAND: ICD-10-CM

## 2023-10-26 DIAGNOSIS — M25.461 EFFUSION OF BURSA OF RIGHT KNEE: ICD-10-CM

## 2023-10-26 DIAGNOSIS — Z00.00 ANNUAL PHYSICAL EXAM: ICD-10-CM

## 2023-10-26 DIAGNOSIS — R73.03 PREDIABETES: ICD-10-CM

## 2023-10-26 DIAGNOSIS — R94.4 DECREASED GFR: ICD-10-CM

## 2023-10-26 DIAGNOSIS — R79.9 ABNORMAL FINDING OF BLOOD CHEMISTRY, UNSPECIFIED: ICD-10-CM

## 2023-10-26 DIAGNOSIS — F51.01 IDIOPATHIC INSOMNIA: ICD-10-CM

## 2023-10-26 DIAGNOSIS — M81.8 OSTEOPOROSIS, IDIOPATHIC: ICD-10-CM

## 2023-10-26 DIAGNOSIS — Z00.00 ANNUAL PHYSICAL EXAM: Primary | ICD-10-CM

## 2023-10-26 DIAGNOSIS — E55.9 VITAMIN D DEFICIENCY, UNSPECIFIED: ICD-10-CM

## 2023-10-26 DIAGNOSIS — R22.31 NODULE OF FINGER OF RIGHT HAND: Primary | ICD-10-CM

## 2023-10-26 LAB
25(OH)D3+25(OH)D2 SERPL-MCNC: 50 NG/ML (ref 30–96)
ALBUMIN SERPL BCP-MCNC: 4.1 G/DL (ref 3.5–5.2)
ALP SERPL-CCNC: 64 U/L (ref 55–135)
ALT SERPL W/O P-5'-P-CCNC: 40 U/L (ref 10–44)
ANION GAP SERPL CALC-SCNC: 10 MMOL/L (ref 8–16)
AST SERPL-CCNC: 27 U/L (ref 10–40)
BACTERIA #/AREA URNS AUTO: NORMAL /HPF
BASOPHILS # BLD AUTO: 0.04 K/UL (ref 0–0.2)
BASOPHILS NFR BLD: 0.8 % (ref 0–1.9)
BILIRUB SERPL-MCNC: 0.6 MG/DL (ref 0.1–1)
BILIRUB UR QL STRIP: NEGATIVE
BUN SERPL-MCNC: 26 MG/DL (ref 8–23)
CALCIUM SERPL-MCNC: 9.7 MG/DL (ref 8.7–10.5)
CHLORIDE SERPL-SCNC: 111 MMOL/L (ref 95–110)
CHOLEST SERPL-MCNC: 128 MG/DL (ref 120–199)
CHOLEST/HDLC SERPL: 2.3 {RATIO} (ref 2–5)
CLARITY UR REFRACT.AUTO: CLEAR
CO2 SERPL-SCNC: 22 MMOL/L (ref 23–29)
COLOR UR AUTO: NORMAL
CREAT SERPL-MCNC: 1.1 MG/DL (ref 0.5–1.4)
DIFFERENTIAL METHOD: ABNORMAL
EOSINOPHIL # BLD AUTO: 0.1 K/UL (ref 0–0.5)
EOSINOPHIL NFR BLD: 1.1 % (ref 0–8)
ERYTHROCYTE [DISTWIDTH] IN BLOOD BY AUTOMATED COUNT: 12 % (ref 11.5–14.5)
EST. GFR  (NO RACE VARIABLE): 52.7 ML/MIN/1.73 M^2
ESTIMATED AVG GLUCOSE: 108 MG/DL (ref 68–131)
GLUCOSE SERPL-MCNC: 90 MG/DL (ref 70–110)
GLUCOSE UR QL STRIP: NEGATIVE
HBA1C MFR BLD: 5.4 % (ref 4–5.6)
HCT VFR BLD AUTO: 39.5 % (ref 37–48.5)
HDLC SERPL-MCNC: 55 MG/DL (ref 40–75)
HDLC SERPL: 43 % (ref 20–50)
HGB BLD-MCNC: 13 G/DL (ref 12–16)
HGB UR QL STRIP: NEGATIVE
IMM GRANULOCYTES # BLD AUTO: 0.01 K/UL (ref 0–0.04)
IMM GRANULOCYTES NFR BLD AUTO: 0.2 % (ref 0–0.5)
KETONES UR QL STRIP: NEGATIVE
LDLC SERPL CALC-MCNC: 57 MG/DL (ref 63–159)
LEUKOCYTE ESTERASE UR QL STRIP: NEGATIVE
LYMPHOCYTES # BLD AUTO: 1.7 K/UL (ref 1–4.8)
LYMPHOCYTES NFR BLD: 31.5 % (ref 18–48)
MCH RBC QN AUTO: 32 PG (ref 27–31)
MCHC RBC AUTO-ENTMCNC: 32.9 G/DL (ref 32–36)
MCV RBC AUTO: 97 FL (ref 82–98)
MICROSCOPIC COMMENT: NORMAL
MONOCYTES # BLD AUTO: 0.5 K/UL (ref 0.3–1)
MONOCYTES NFR BLD: 9.6 % (ref 4–15)
NEUTROPHILS # BLD AUTO: 3 K/UL (ref 1.8–7.7)
NEUTROPHILS NFR BLD: 56.8 % (ref 38–73)
NITRITE UR QL STRIP: NEGATIVE
NONHDLC SERPL-MCNC: 73 MG/DL
NRBC BLD-RTO: 0 /100 WBC
PH UR STRIP: 6 [PH] (ref 5–8)
PLATELET # BLD AUTO: 220 K/UL (ref 150–450)
PMV BLD AUTO: 10 FL (ref 9.2–12.9)
POTASSIUM SERPL-SCNC: 4.9 MMOL/L (ref 3.5–5.1)
PROT SERPL-MCNC: 6.8 G/DL (ref 6–8.4)
PROT UR QL STRIP: NEGATIVE
RBC # BLD AUTO: 4.06 M/UL (ref 4–5.4)
RBC #/AREA URNS AUTO: 0 /HPF (ref 0–4)
SODIUM SERPL-SCNC: 143 MMOL/L (ref 136–145)
SP GR UR STRIP: 1 (ref 1–1.03)
SQUAMOUS #/AREA URNS AUTO: 0 /HPF
TRIGL SERPL-MCNC: 80 MG/DL (ref 30–150)
TSH SERPL DL<=0.005 MIU/L-ACNC: 1.25 UIU/ML (ref 0.4–4)
URN SPEC COLLECT METH UR: NORMAL
WBC # BLD AUTO: 5.33 K/UL (ref 3.9–12.7)
WBC #/AREA URNS AUTO: 1 /HPF (ref 0–5)

## 2023-10-26 PROCEDURE — 99999 PR PBB SHADOW E&M-EST. PATIENT-LVL IV: ICD-10-PCS | Mod: PBBFAC,,, | Performed by: INTERNAL MEDICINE

## 2023-10-26 PROCEDURE — 81001 URINALYSIS AUTO W/SCOPE: CPT | Performed by: INTERNAL MEDICINE

## 2023-10-26 PROCEDURE — 99214 OFFICE O/P EST MOD 30 MIN: CPT | Mod: PBBFAC | Performed by: INTERNAL MEDICINE

## 2023-10-26 PROCEDURE — 73130 XR HAND COMPLETE 3 VIEW RIGHT: ICD-10-PCS | Mod: 26,RT,, | Performed by: RADIOLOGY

## 2023-10-26 PROCEDURE — 84443 ASSAY THYROID STIM HORMONE: CPT | Performed by: INTERNAL MEDICINE

## 2023-10-26 PROCEDURE — 73130 X-RAY EXAM OF HAND: CPT | Mod: TC,FY,RT

## 2023-10-26 PROCEDURE — 83036 HEMOGLOBIN GLYCOSYLATED A1C: CPT | Performed by: INTERNAL MEDICINE

## 2023-10-26 PROCEDURE — 80061 LIPID PANEL: CPT | Performed by: INTERNAL MEDICINE

## 2023-10-26 PROCEDURE — 99214 OFFICE O/P EST MOD 30 MIN: CPT | Mod: S$PBB,,, | Performed by: INTERNAL MEDICINE

## 2023-10-26 PROCEDURE — 80053 COMPREHEN METABOLIC PANEL: CPT | Performed by: INTERNAL MEDICINE

## 2023-10-26 PROCEDURE — 73562 XR KNEE 3 VIEW BILATERAL: ICD-10-PCS | Mod: 26,50,, | Performed by: RADIOLOGY

## 2023-10-26 PROCEDURE — 73130 X-RAY EXAM OF HAND: CPT | Mod: 26,RT,, | Performed by: RADIOLOGY

## 2023-10-26 PROCEDURE — 82306 VITAMIN D 25 HYDROXY: CPT | Performed by: INTERNAL MEDICINE

## 2023-10-26 PROCEDURE — 73562 X-RAY EXAM OF KNEE 3: CPT | Mod: TC,50,FY

## 2023-10-26 PROCEDURE — 99214 PR OFFICE/OUTPT VISIT, EST, LEVL IV, 30-39 MIN: ICD-10-PCS | Mod: S$PBB,,, | Performed by: INTERNAL MEDICINE

## 2023-10-26 PROCEDURE — 73562 X-RAY EXAM OF KNEE 3: CPT | Mod: 26,50,, | Performed by: RADIOLOGY

## 2023-10-26 PROCEDURE — 99999 PR PBB SHADOW E&M-EST. PATIENT-LVL IV: CPT | Mod: PBBFAC,,, | Performed by: INTERNAL MEDICINE

## 2023-10-26 PROCEDURE — 85025 COMPLETE CBC W/AUTO DIFF WBC: CPT | Performed by: INTERNAL MEDICINE

## 2023-10-26 RX ORDER — SUVOREXANT 5 MG/1
5 TABLET, FILM COATED ORAL NIGHTLY PRN
Qty: 30 TABLET | Refills: 2 | Status: SHIPPED | OUTPATIENT
Start: 2023-10-26 | End: 2024-02-01

## 2023-10-26 RX ORDER — DICLOFENAC SODIUM 10 MG/G
2 GEL TOPICAL 4 TIMES DAILY
Qty: 100 G | Refills: 2 | Status: SHIPPED | OUTPATIENT
Start: 2023-10-26

## 2023-10-27 NOTE — PROGRESS NOTES
Subjective:       Patient ID: Saritha Guzman is a 73 y.o. female who presents today for:    Chief Complaint:   Chief Complaint   Patient presents with    Annual Exam       HPI:  Very pleasant nonsmoking 73-year-old female here for an annual exam.  She has a past medical history significant for left-sided breast cancer back in 2013,stage I ER positive disease with a high Oncotype score and she is status post 7 years of letrozole.  She follows with high-risk breast clinic and did so this past May.  She also has a history of dyslipidemia and on combination therapy with atorvastatin and Zetia.  She has a history of osteoporosis and receives her last Reclast dose after many years of being on Reclast last year and we are waiting on this next bone density to see if we should continue.  She also may benefit from Prolia as an alternative when a bisphosphonate break is warranted.    She is very active and enjoys walking and playing pickleball.  She does have some issues and is complaining of incomplete sleep and if she does not take an over-the-counter supplement called simply sleep or lorazepam she has restless sleep with vivid dreams.  We discussed alternatives to sleep and how important a good night's sleep is.    She also is complaining about a growth on her right hand on her metacarpophalangeal joint that she noticed a few weeks ago.  Is not necessarily painful but if she were to accidentally hit the area it would be uncomfortable.  She also is complaining of right knee lateral pain which began after she slipped a few months ago.  She is still able to walk and play pickleball but the knee does hurt surrounding that.  She does not take excessive anti-inflammatories over-the-counter.  She is not really tried any Tylenol for this discomfort.  There is some mild swelling on the knee as well.    Review of Systems   Constitutional:  Negative for chills, fever and weight loss.   HENT:  Negative for sore throat.    Eyes:   Negative for blurred vision and double vision.   Respiratory:  Negative for cough and shortness of breath.    Cardiovascular:  Negative for chest pain and palpitations.   Gastrointestinal:  Negative for constipation, diarrhea, nausea and vomiting.   Genitourinary:  Negative for dysuria and hematuria.   Musculoskeletal:  Positive for joint pain. Negative for myalgias.        Per HPI   Skin:  Negative for itching and rash.   Neurological:  Negative for sensory change, focal weakness and headaches.   Endo/Heme/Allergies:  Does not bruise/bleed easily.   Psychiatric/Behavioral:  Negative for depression and suicidal ideas.         Medications:  Outpatient Encounter Medications as of 10/26/2023   Medication Sig Note Dispense Refill    atorvastatin (LIPITOR) 20 MG tablet Take 1 tablet (20 mg total) by mouth once daily.  90 tablet 3    biotin 1,000 mcg Chew Take by mouth.       calcium-vitamin D3 (OS-CHIN 500 + D3) 500 mg-5 mcg (200 unit) per tablet Take 1 tablet by mouth 2 (two) times daily with meals.       cholecalciferol, vitamin D3, (VITAMIN D3) 25 mcg (1,000 unit) capsule Take 2,000 Units by mouth once daily.       coenzyme Q10 200 mg capsule Take 200 mg by mouth once daily.       cyanocobalamin (VITAMIN B-12) 1000 MCG tablet Take 1,000 mcg by mouth once daily.       ezetimibe (ZETIA) 10 mg tablet Take 1 tablet (10 mg total) by mouth once daily.  90 tablet 0    INTRAROSA 6.5 mg Inst USE 1 INSERT VAGINALLY THREE TIMES A WEEK  30 each 11    ALPRAZolam (XANAX) 0.25 MG tablet Take 1 tablet (0.25 mg total) by mouth daily as needed for Anxiety. (Patient not taking: Reported on 10/26/2023)  15 tablet 0    diclofenac sodium (VOLTAREN) 1 % Gel Apply 2 g topically 4 (four) times daily. Apply to knee  100 g 2    LORazepam (ATIVAN) 1 MG tablet Take 1 tablet (1 mg total) by mouth every 12 (twelve) hours as needed. (Patient not taking: Reported on 10/26/2023)  30 tablet 2    suvorexant (BELSOMRA) 5 mg Tab Take 5 mg by mouth nightly  as needed.  30 tablet 2    [DISCONTINUED] ascorbic acid (VITAMIN C) 1000 MG tablet Take 1,000 mg by mouth once daily.       [DISCONTINUED] ciprofloxacin HCl (CIPRO) 500 MG tablet Take 1 tablet (500 mg total) by mouth every 12 (twelve) hours.  14 tablet 0    [DISCONTINUED] DOCOSAHEXANOIC ACID/EPA (FISH OIL ORAL) Take by mouth. 10/25/2013: Hold am sx      [DISCONTINUED] ondansetron (ZOFRAN) 8 MG tablet Take 1 tablet (8 mg total) by mouth every 8 (eight) hours as needed for Nausea.  21 tablet 0    [DISCONTINUED] traZODone (DESYREL) 50 MG tablet Take 1 tablet (50 mg total) by mouth every evening. (Patient not taking: Reported on 5/17/2023)  30 tablet 11    [DISCONTINUED] triamcinolone acetonide 0.1% (KENALOG) 0.1 % cream Apply topically 2 (two) times daily. Apply as needed over itchy areas of rash. Not for face.  30 g 0     No facility-administered encounter medications on file as of 10/26/2023.       Allergies:  Review of patient's allergies indicates:   Allergen Reactions    Codeine     Corticosteroids (glucocorticoids)      Oral version puts in manic state    Penicillins     Tetracyclines Nausea And Vomiting       Health Maintenance:  Immunization History   Administered Date(s) Administered    COVID-19, MRNA, LN-S, PF (Pfizer) (Gray Cap) 04/05/2022    COVID-19, MRNA, LN-S, PF (Pfizer) (Purple Cap) 01/05/2021, 01/26/2021, 09/15/2021    COVID-19, mRNA, LNP-S, bivalent booster, PF (PFIZER OMICRON) 10/19/2022    DTaP 01/26/2012    Hepatitis A 01/26/2012    Hepatitis A, Pediatric/Adolescent, 2 Dose 01/26/2012    Influenza 09/22/2019    Influenza (FLUAD) - Quadrivalent - Adjuvanted - PF *Preferred* (65+) 10/08/2021, 09/22/2022    Influenza (FLUAD) - Trivalent - Adjuvanted - PF (65+) 09/22/2019    Influenza - High Dose - PF (65 years and older) 09/21/2015, 09/17/2016, 08/24/2017, 08/25/2017, 09/22/2018, 08/17/2020    Influenza - Quadrivalent - PF *Preferred* (6 months and older) 08/14/2014, 08/17/2020    Influenza -  Trivalent - PF (ADULT) 08/17/2020    Influenza A (H1N1) 2009 Monovalent - IM 11/23/2009    Influenza Split 08/14/2014, 09/21/2015    Influenza Whole 08/23/2010, 08/22/2013    Pneumococcal Conjugate - 13 Valent 07/31/2017    Pneumococcal Polysaccharide - 23 Valent 01/05/2010, 12/14/2010    RSV IGIV 09/25/2023    Rsv, Bivalent, Rsvpref (Abrysvo) 09/25/2023    Tdap 05/05/2022    Tetanus 05/05/2022    Yellow Fever 07/31/2017    Zoster 10/22/2008    Zoster Recombinant 07/18/2018, 07/21/2018, 07/21/2018, 09/29/2018, 09/29/2018      Health Maintenance   Topic Date Due    Hepatitis C Screening  Never done    Mammogram  08/03/2024    High Dose Statin  10/26/2024    DEXA Scan  11/22/2024    Lipid Panel  10/26/2028    TETANUS VACCINE  05/05/2032    Shingles Vaccine  Completed          Objective:      Vital Signs  BP: 104/74  Pain Score: 0-No pain  Height and Weight  Weight: 48.3 kg (106 lb 6.4 oz)]    Physical Exam  HENT:      Head: Normocephalic and atraumatic.   Eyes:      General: No scleral icterus.  Cardiovascular:      Rate and Rhythm: Normal rate and regular rhythm.      Heart sounds: No murmur heard.  Pulmonary:      Effort: Pulmonary effort is normal.      Breath sounds: Normal breath sounds. No rales.   Abdominal:      General: Bowel sounds are normal.      Palpations: Abdomen is soft.      Tenderness: There is no abdominal tenderness.   Musculoskeletal:         General: No tenderness.      Cervical back: Neck supple.   Neurological:      Mental Status: She is alert and oriented to person, place, and time.   Psychiatric:         Mood and Affect: Mood normal.         Behavior: Behavior normal.         Thought Content: Thought content normal.         Judgment: Judgment normal.        Lab Results   Component Value Date    WBC 5.33 10/26/2023    HGB 13.0 10/26/2023    HCT 39.5 10/26/2023     10/26/2023    CHOL 128 10/26/2023    TRIG 80 10/26/2023    HDL 55 10/26/2023    ALT 40 10/26/2023    AST 27 10/26/2023    NA  143 10/26/2023    K 4.9 10/26/2023     (H) 10/26/2023    CREATININE 1.1 10/26/2023    BUN 26 (H) 10/26/2023    CO2 22 (L) 10/26/2023    TSH 1.248 10/26/2023    INR 0.9 03/24/2004    HGBA1C 5.4 10/26/2023       Assessment/plan:     Saritha Guzman is a 73 y.o.female here for an annual physical exam:     Nodule of finger of right hand- like a ganglion cyst with secondary osteoarthritis  -     X-Ray Hand 3 view Right; Future; Expected date: 10/26/2023  -     Ambulatory referral/consult to Orthopedics; Future; Expected date: 11/02/2023    Effusion of bursa of right knee/chronic discomfort right lateral knee since slipping few months ago.  -     Ambulatory referral/consult to Sports Medicine; Future; Expected date: 11/02/2023  -     diclofenac sodium (VOLTAREN) 1 % Gel; Apply 2 g topically 4 (four) times daily. Apply to knee  Dispense: 100 g; Refill: 2  -     X-Ray Knee 3 View Bilateral; Future; Expected date: 10/26/2023    Osteoporosis, idiopathic   Bone density scheduled next month.  Patient is exercising regularly.  Continue with vitamin-D and calcium supplements as we discussed.    Hyperlipidemia, goal LDL less than 100   Continue with atorvastatin 20 mg and ezetimibe 10 mg daily     Idiopathic insomnia   Can also alternate with simply sleep and lorazepam  -     suvorexant (BELSOMRA) 5 mg Tab; Take 5 mg by mouth nightly as needed.  Dispense: 30 tablet; Refill: 2    Decreased GFR-this needs to be rechecked hydrated.  In the interim as we discussed avoid NSAIDs  -     Basic Metabolic Panel; Future; Expected date: 10/26/2023        Future Appointments   Date Time Provider Department Center   10/30/2023  2:00 PM Micaela Sykes MD [facility] SP[facility]TS [facility]   11/27/2023  2:20 PM [facility], DEXA1 [facility]   11/29/2023 10:00 AM Parul Brian MD [facility] [place] [facility]   1/16/2024  9:00 AM [facility] MRI1 350 LB LIMIT [facility]       Lisa Kelley MD  [facility]

## 2023-10-30 ENCOUNTER — HOSPITAL ENCOUNTER (OUTPATIENT)
Dept: RADIOLOGY | Facility: HOSPITAL | Age: 73
Discharge: HOME | End: 2023-10-30
Attending: ORTHOPAEDIC SURGERY
Payer: MEDICARE

## 2023-10-30 ENCOUNTER — OFFICE VISIT (OUTPATIENT)
Dept: SPORTS MEDICINE | Facility: CLINIC | Age: 73
End: 2023-10-30
Payer: MEDICARE

## 2023-10-30 VITALS
SYSTOLIC BLOOD PRESSURE: 106 MMHG | BODY MASS INDEX: 18.87 KG/M2 | HEIGHT: 63 IN | DIASTOLIC BLOOD PRESSURE: 66 MMHG | HEART RATE: 83 BPM | WEIGHT: 106.5 LBS

## 2023-10-30 DIAGNOSIS — M17.11 PRIMARY OSTEOARTHRITIS OF RIGHT KNEE: Primary | ICD-10-CM

## 2023-10-30 DIAGNOSIS — M25.461 EFFUSION OF BURSA OF RIGHT KNEE: ICD-10-CM

## 2023-10-30 PROCEDURE — 73565 X-RAY EXAM OF KNEES: CPT | Mod: 26,,, | Performed by: RADIOLOGY

## 2023-10-30 PROCEDURE — 99999 PR PBB SHADOW E&M-EST. PATIENT-LVL IV: CPT | Mod: PBBFAC,,, | Performed by: ORTHOPAEDIC SURGERY

## 2023-10-30 PROCEDURE — 73565 XR KNEE AP STANDING BILATERAL: ICD-10-PCS | Mod: 26,,, | Performed by: RADIOLOGY

## 2023-10-30 PROCEDURE — 99999 PR PBB SHADOW E&M-EST. PATIENT-LVL IV: ICD-10-PCS | Mod: PBBFAC,,, | Performed by: ORTHOPAEDIC SURGERY

## 2023-10-30 PROCEDURE — 99214 OFFICE O/P EST MOD 30 MIN: CPT | Mod: PBBFAC | Performed by: ORTHOPAEDIC SURGERY

## 2023-10-30 PROCEDURE — 99204 OFFICE O/P NEW MOD 45 MIN: CPT | Mod: S$PBB,,, | Performed by: ORTHOPAEDIC SURGERY

## 2023-10-30 PROCEDURE — 99204 PR OFFICE/OUTPT VISIT, NEW, LEVL IV, 45-59 MIN: ICD-10-PCS | Mod: S$PBB,,, | Performed by: ORTHOPAEDIC SURGERY

## 2023-10-30 PROCEDURE — 73565 X-RAY EXAM OF KNEES: CPT | Mod: TC

## 2023-10-30 NOTE — PROGRESS NOTES
CC: Right knee pain, retired, very active, plays pickleball,  is a patient of mine, PCP: Dr. Yadav    73 y.o. Female with a history of Right knee pain   She states that she fell about a month ago during pickleball, but has been having discomfort in her right knee for 2 years.   Since the fall she has been having more pain, specifically with stairs   She has not had any injections or medications besides advil as needed.   She has no history of surgery on this knee  Patient localizes pain to medial side of her knee   She states she has intermitted mechanical symptoms,        VAS 0/10  SANE: 70    Review of Systems   Constitution: Negative. Negative for chills, fever and night sweats.   HENT: Negative for congestion and headaches.    Eyes: Negative for blurred vision, left vision loss and right vision loss.   Cardiovascular: Negative for chest pain and syncope.   Respiratory: Negative for cough and shortness of breath.    Endocrine: Negative for polydipsia, polyphagia and polyuria.   Hematologic/Lymphatic: Negative for bleeding problem. Does not bruise/bleed easily.   Skin: Negative for dry skin, itching and rash.   Musculoskeletal: Negative for falls. Positive for knee pain and muscle weakness.   Gastrointestinal: Negative for abdominal pain and bowel incontinence.   Genitourinary: Negative for bladder incontinence and nocturia.   Neurological: Negative for disturbances in coordination, loss of balance and seizures.   Psychiatric/Behavioral: Negative for depression. The patient does not have insomnia.    Allergic/Immunologic: Negative for hives and persistent infections.     PAST MEDICAL HISTORY:   Past Medical History:   Diagnosis Date    Atypical ductal hyperplasia, breast 2008    Left    Breast CA 07/2013    left LCIS on bx.'s s/p chemo, masectomy    Family history of stroke     Fibrocystic breast     Goiter     History of endometriosis     Hyperlipidemia     Lobular carcinoma in situ     PONV  (postoperative nausea and vomiting)      PAST SURGICAL HISTORY:   Past Surgical History:   Procedure Laterality Date    APPENDECTOMY      3 yrs --- had polio    BREAST BIOPSY Left 06/2013    Core bx, LCIS    BREAST BIOPSY Left 07/2013    MRI Core bx, LCIS    BREAST BIOPSY Left 2008    Core bx, ADH    BREAST BIOPSY Left 2008    Excisional bx., ADH    BREAST BIOPSY Left 06/29/2021    muscle with suture    BREAST CYST ASPIRATION      BREAST LUMPECTOMY Left 07/2013     [facility]    BREAST RECONSTRUCTION      COLONOSCOPY N/A 09/29/2020    Procedure: COLONOSCOPY;  Surgeon: Mundo Parra MD;  Location: [facility] ([address] [place] [address]);  Service: Endoscopy;  Laterality: N/A;  COVID test on 9/26 [place]    FACELIFT      HYSTERECTOMY      partial hysterectomy late 20's    MASTECTOMY Left 2013         SINUS SURGERY      tonsillectomy       FAMILY HISTORY:   Family History   Problem Relation Age of Onset    Stroke Mother     Alcohol abuse Father     Diabetes Father     Depression Sister     No Known Problems Brother     Alcohol abuse Brother     Depression Brother     Stroke Maternal Aunt     No Known Problems Maternal Uncle     No Known Problems Paternal Aunt     No Known Problems Paternal Uncle     Cancer Maternal Grandmother         spine    Stroke Maternal Grandmother     Cancer Maternal Grandfather         jaw    No Known Problems Paternal Grandmother     No Known Problems Paternal Grandfather     Thyroid cancer Neg Hx     Breast cancer Neg Hx     Ovarian cancer Neg Hx     Amblyopia Neg Hx     Blindness Neg Hx     Cataracts Neg Hx     Glaucoma Neg Hx     Hypertension Neg Hx     Macular degeneration Neg Hx     Retinal detachment Neg Hx     Strabismus Neg Hx     Thyroid disease Neg Hx     Colon cancer Neg Hx      SOCIAL HISTORY:   Social History     Socioeconomic History    Marital status:    Tobacco Use    Smoking status: Never    Smokeless tobacco: Never   Substance and Sexual Activity    Alcohol use: Yes     Alcohol/week: 2.0  standard drinks of alcohol     Types: 2 Glasses of wine per week    Drug use: No    Sexual activity: Yes     Partners: Male     Birth control/protection: None     Social Determinants of Health     Financial Resource Strain: Low Risk  (10/30/2023)    Overall Financial Resource Strain (CARDIA)     Difficulty of Paying Living Expenses: Not hard at all   Food Insecurity: No Food Insecurity (10/30/2023)    Hunger Vital Sign     Worried About Running Out of Food in the Last Year: Never true     Ran Out of Food in the Last Year: Never true   Transportation Needs: No Transportation Needs (10/30/2023)    PRAPARE - Transportation     Lack of Transportation (Medical): No     Lack of Transportation (Non-Medical): No   Physical Activity: Sufficiently Active (10/30/2023)    Exercise Vital Sign     Days of Exercise per Week: 5 days     Minutes of Exercise per Session: 60 min   Stress: Stress Concern Present (10/30/2023)    [employer] [place] [employer] - Occupational Stress Questionnaire     Feeling of Stress : Rather much   Social Connections: Unknown (10/30/2023)    Social Connection and Isolation Panel [NHANES]     Frequency of Communication with Friends and Family: More than three times a week     Frequency of Social Gatherings with Friends and Family: More than three times a week     Active Member of Clubs or Organizations: Yes     Attends Club or Organization Meetings: More than 4 times per year     Marital Status:    Housing Stability: Low Risk  (10/30/2023)    Housing Stability Vital Sign     Unable to Pay for Housing in the Last Year: No     Number of Places Lived in the Last Year: 1     Unstable Housing in the Last Year: No       MEDICATIONS:   Current Outpatient Medications:     atorvastatin (LIPITOR) 20 MG tablet, Take 1 tablet (20 mg total) by mouth once daily., Disp: 90 tablet, Rfl: 3    biotin 1,000 mcg Chew, Take by mouth., Disp: , Rfl:     calcium-vitamin D3 (OS-CHIN 500 + D3) 500 mg-5 mcg (200  "unit) per tablet, Take 1 tablet by mouth 2 (two) times daily with meals., Disp: , Rfl:     cholecalciferol, vitamin D3, (VITAMIN D3) 25 mcg (1,000 unit) capsule, Take 2,000 Units by mouth once daily., Disp: , Rfl:     coenzyme Q10 200 mg capsule, Take 200 mg by mouth once daily., Disp: , Rfl:     cyanocobalamin (VITAMIN B-12) 1000 MCG tablet, Take 1,000 mcg by mouth once daily., Disp: , Rfl:     diclofenac sodium (VOLTAREN) 1 % Gel, Apply 2 g topically 4 (four) times daily. Apply to knee, Disp: 100 g, Rfl: 2    ezetimibe (ZETIA) 10 mg tablet, Take 1 tablet (10 mg total) by mouth once daily., Disp: 90 tablet, Rfl: 0    INTRAROSA 6.5 mg Inst, USE 1 INSERT VAGINALLY THREE TIMES A WEEK, Disp: 30 each, Rfl: 11    ALPRAZolam (XANAX) 0.25 MG tablet, Take 1 tablet (0.25 mg total) by mouth daily as needed for Anxiety. (Patient not taking: Reported on 10/26/2023), Disp: 15 tablet, Rfl: 0    LORazepam (ATIVAN) 1 MG tablet, Take 1 tablet (1 mg total) by mouth every 12 (twelve) hours as needed. (Patient not taking: Reported on 10/26/2023), Disp: 30 tablet, Rfl: 2    suvorexant (BELSOMRA) 5 mg Tab, Take 5 mg by mouth nightly as needed. (Patient not taking: Reported on 10/30/2023), Disp: 30 tablet, Rfl: 2  ALLERGIES:   Review of patient's allergies indicates:   Allergen Reactions    Codeine     Corticosteroids (glucocorticoids)      Oral version puts in manic state    Penicillins     Tetracyclines Nausea And Vomiting       VITAL SIGNS: /66   Pulse 83   Ht 5' 3" (1.6 m)   Wt 48.3 kg (106 lb 7.7 oz)   LMP 01/01/1978 (Within Months)   BMI 18.86 kg/m²      PHYSICAL EXAMINATION  VITAL SIGNS: /66   Pulse 83   Ht 5' 3" (1.6 m)   Wt 48.3 kg (106 lb 7.7 oz)   LMP 01/01/1978 (Within Months)   BMI 18.86 kg/m²    General:  The patient is alert and oriented x 3.  Mood is pleasant.  Observation of ears, eyes and nose reveal no gross abnormalities.  HEENT: NCAT, sclera nonicteric  Lungs: Respirations are equal and " unlabored.    Right KNEE EXAMINATION     OBSERVATION / INSPECTION   Gait:   Nonantalgic   Alignment:  Neutral   Scars:   None   Muscle atrophy: Mild  Effusion:  None   Warmth:  None   Discoloration:   none     TENDERNESS / CREPITUS (T / C):          T / C      T / C   Patella   - / -   Lateral joint line   - / -    Peripatellar medial  -  Medial joint line    + / -    Peripatellar lateral -  Medial plica   - / -    Patellar tendon -   Popliteal fossa   - / -    Quad tendon   -   Gastrocnemius   -   Prepatellar Bursa - / -   Quadricep   -   Tibial tubercle  -  Thigh/hamstring  -   Pes anserine/HS -  Fibula    -   ITB   - / -  Tibia     -   Tib/fib joint  - / -  LCL    -     MFC   - / -   MCL: Proximal  -    LFC   - / -    Distal   -          ROM: (* = pain)  PASSIVE   ACTIVE    Left :   5 / 0 / 145   5 / 0 / 145     Right :    5 / 0 / 145   5 / 0 / 145    PATELLOFEMORAL EXAMINATION:  See above noted areas of tenderness.   Patella position    Subluxation / dislocation: Centered           Sup. / Inf;   Normal   Crepitus (PF):    Absent   Patellar Mobility:       Medial-lateral:   Normal    Superior-inferior:  Normal    Inferior tilt   Normal    Patellar tendon:  Normal   Lateral tilt:    Normal   J-sign:     None   Patellofemoral grind:   No pain       MENISCAL SIGNS:     Pain on terminal extension:  -  Pain on terminal flexion:  -  Dee Dees maneuver:  - for pain  Squat     - posterior joint pain    LIGAMENT EXAMINATION:  ACL / Lachman:  normal (-1 to 2mm)    PCL-Post.  drawer: normal 0 to 2mm  MCL- Valgus:  normal 0 to 2mm  LCL- Varus:  normal 0 to 2mm  Pivot shift: normal (Equal)   Dial Test: difference c/w other side   At 30° flexion: normal (< 5°)    At 90° flexion: normal (< 5°)   Reverse Pivot Shift:   normal (Equal)     STRENGTH: (* = with pain) PAINFUL SIDE   Quadricep   5/5   Hamstrin/5    EXTREMITY NEURO-VASCULAR EXAMINATION:   Sensation:  Grossly intact to light touch all dermatomal regions.    Motor Function:  Fully intact motor function at hip, knee, foot and ankle    DTRs;  quadriceps and  achilles 2+.  No clonus and downgoing Babinski.    Vascular status:  DP and PT pulses 2+, brisk capillary refill, symmetric.     Other Findings:       X-rays reviewed and interpreted personally by me:  including standing, weight bearing AP and flexion bilateral knees, lateral and merchant views ordered and images reviewed by me show:  No fracture, dislocation. Minimal joint space narrowing with osteophyte formation. Kellgren Rambo grade 2      ASSESSMENT:    Right Knee osteoarthritis     PLAN:   Physical therapy with Marce FOSTER   Will see us back as needed   All questions were answered, pt will contact us for questions or concerns in the interim.    I made the decision to obtain old records of the patient including previous notes and imaging. New imaging was ordered today of the extremity or extremities evaluated. I independently reviewed and interpreted the radiographs and/or MRIs today as well as prior imaging.

## 2023-10-31 ENCOUNTER — TELEPHONE (OUTPATIENT)
Dept: INTERNAL MEDICINE | Facility: CLINIC | Age: 73
End: 2023-10-31
Payer: MEDICARE

## 2023-10-31 NOTE — TELEPHONE ENCOUNTER
Amy, can you call her and book her for the BMP at 2 pm Friday   And a follow up with me the week of 11/13?-

## 2023-11-03 ENCOUNTER — CLINICAL SUPPORT (OUTPATIENT)
Dept: INTERNAL MEDICINE | Facility: CLINIC | Age: 73
End: 2023-11-03
Payer: MEDICARE

## 2023-11-03 DIAGNOSIS — R94.4 DECREASED GFR: ICD-10-CM

## 2023-11-03 LAB
ANION GAP SERPL CALC-SCNC: 12 MMOL/L (ref 8–16)
BUN SERPL-MCNC: 21 MG/DL (ref 8–23)
CALCIUM SERPL-MCNC: 9.5 MG/DL (ref 8.7–10.5)
CHLORIDE SERPL-SCNC: 103 MMOL/L (ref 95–110)
CO2 SERPL-SCNC: 21 MMOL/L (ref 23–29)
CREAT SERPL-MCNC: 0.8 MG/DL (ref 0.5–1.4)
EST. GFR  (NO RACE VARIABLE): >60 ML/MIN/1.73 M^2
GLUCOSE SERPL-MCNC: 99 MG/DL (ref 70–110)
POTASSIUM SERPL-SCNC: 3.9 MMOL/L (ref 3.5–5.1)
SODIUM SERPL-SCNC: 136 MMOL/L (ref 136–145)

## 2023-11-03 PROCEDURE — 80048 BASIC METABOLIC PNL TOTAL CA: CPT | Performed by: INTERNAL MEDICINE

## 2023-11-08 ENCOUNTER — CLINICAL SUPPORT (OUTPATIENT)
Dept: REHABILITATION | Facility: HOSPITAL | Age: 73
End: 2023-11-08
Attending: ORTHOPAEDIC SURGERY
Payer: MEDICARE

## 2023-11-08 DIAGNOSIS — M17.11 PRIMARY OSTEOARTHRITIS OF RIGHT KNEE: ICD-10-CM

## 2023-11-08 PROCEDURE — 97161 PT EVAL LOW COMPLEX 20 MIN: CPT

## 2023-11-08 PROCEDURE — 97112 NEUROMUSCULAR REEDUCATION: CPT

## 2023-11-09 NOTE — PLAN OF CARE
[facility]  Physical Therapy Initial Evaluation    Name: Saritha Guzman  Clinic Number: [id number]    Therapy Diagnosis:   Encounter Diagnosis   Name Primary?    Primary osteoarthritis of right knee      Physician: Micaela Sykes MD    Physician Orders: PT Eval and Treat  Medical Diagnosis from Referral: M17.11 (ICD-10-CM) - Primary osteoarthritis of right knee  Evaluation Date: 11/8/2023  Authorization Period Expiration: 12/31/2023  Plan of Care Expiration: 12/31/2023  Progress Note Due: 12/09/2023  FOTO: 1/1  Visit # / Visits authorized: 1/ 1    Time In: 1000  Time Out: 1100  Total Billable Time: 40 minutes    Precautions: Standard    Subjective     Date of onset: a few months ago; recent MD visit  History of current condition - Saritha reports: she was playing pickleball and her feet got tripped up causing her to fall on her side and twist her knee. The patient notes that she thought her knee pain would resolve but it has continued and is affecting her ability to walk up and down stairs, complete lunges/squats and play pickleball pain free and without compensation. The patient notes that pain does not radiate and is located along medial knee cap, denies any popping or locking of her knee but occasionally has clicking      Imaging X-ray (+) mild arthritic changes     Prior Therapy: none for knee  Exercise Routine/Sport Participation: yoga, daily power walking, pickleball  Social History: lives at home with   Occupation: Retired  Prior Level of Function: intermittent knee soreness   Current Level of Function: sig pain with above movements     Pain:  Current 1/10, worst 6/10, best 1/10   Location: right knee medial knee cap area   Description: Aching, Dull, and Tight  Aggravating Factors: see above  Easing Factors: rest    Pts goals: return to her activities pain free and stronger       Medical History:   Past Medical History:   Diagnosis Date    Atypical ductal hyperplasia, breast      Left    Breast CA 2013    left LCIS on bx.'s s/p chemo, masectomy    Family history of stroke     Fibrocystic breast     Goiter     History of endometriosis     Hyperlipidemia     Lobular carcinoma in situ     PONV (postoperative nausea and vomiting)        Surgical History:   Saritha Guzman  has a past surgical history that includes Hysterectomy; tonsillectomy; Appendectomy; Facelift; Sinus surgery; Breast cyst aspiration; Breast lumpectomy (Left, 2013); Mastectomy (Left, ); Breast reconstruction; Colonoscopy (N/A, 2020); Breast biopsy (Left, 2013); Breast biopsy (Left, 2013); Breast biopsy (Left, ); Breast biopsy (Left, ); and Breast biopsy (Left, 2021).    Medications:   Saritha has a current medication list which includes the following prescription(s): alprazolam, atorvastatin, biotin, calcium-vitamin d3, cholecalciferol (vitamin d3), coenzyme q10, cyanocobalamin, diclofenac sodium, ezetimibe, intrarosa, lorazepam, and belsomra.    Allergies:   Review of patient's allergies indicates:   Allergen Reactions    Codeine     Corticosteroids (glucocorticoids)      Oral version puts in manic state    Penicillins     Tetracyclines Nausea And Vomiting        Objective     Posture: femoral ADD/IR, pes plantus B     Functional Tests:  Gait: sig valgus of knee, femoral Add/IR B, lack of resupination B   Chair Pose: knee valgus, femoral ADD/IR as well, pronation sig   SLS EO: R Fair  , L Fair    Knee Passive Range of Motion:   Right  Left    Flexion 140 140   Extension 5 hyper 5 hyper       Hip Passive Range of Motion:   Right  Left    Flexion 120 120   Extension 20 20   Ext. Rotation 45 45   Int. Rotation 30 30       Ankle Passive Range of Motion:   Right  Left    Dorsiflexion 0 0   1st MTP Extension 30 30       Lower Extremity Strength:   Right  Left    Quadriceps: 4/5 5/5   Hamstring at 90 de/5 4+/5   Iliopsoas (sitting): 3/5 3/5   Hip extension:  3/5 3+/5   PGM: 3/5 3+/5        Special Tests:   Right Left   McMurrays  - -   Apleys Compression/  Distraction - -      Right Left   Patella Grind + -   Patella Apprehension - -     Joint Mobility: patella dec mobility R med/latera *pain, dec fat pad mobility R      Palpation: ttp along medial PFJ, medial patella pole     Flexibility:    Ely's test: R - ; L -    Hamstrings: R - ; L  -   Michael Test Right  Left    Iliopsoas - -   Rectus Femoris  - -           CMS Impairment/Limitation/Restriction for FOTO LEFS Survey    Therapist reviewed FOTO scores for Saritha Guzman on 11/8/2023.   FOTO documents entered into [employer] - see Media section.    Limitation Score: 48%  Category: Mobility       Treatment     Treatment Time In: 1030  Treatment Time Out: 1156  Total Treatment time separate from Evaluation: 20 minutes     Saritha received the treatments listed below:      Neuromuscular re-education activities to improve: motor control for 20 minutes. The following activities were included:  LAQ 2# 10x   SLR 10x (bolster under heel)   Calf Raises DL 20x  Bridges          Home Exercises and Patient Education Provided     Education provided:   - complete exercises daily, limit painful mvoements, decrease pickleball play   - Prognosis, activity modification, goals for therapy, role of therapy for care, exercises/HEP    Written Home Exercises Provided: yes.  Exercises were reviewed and Saritha was able to demonstrate them prior to the end of the session.   Pt received a written copy of exercises to perform at home. Saritha demonstrated good  understanding of the education provided.     See EMR under patient instructions for exercises given.     Assessment     Saritha is a 73 y.o. female referred to outpatient Physical Therapy with traumatic onset R knee pain. The patient with underlying PFPS leading to pain with loaded flexion movement uhc as stairs, squatting as well as plyometric exercise such as pickleball. The patient with sig strength deficits of hips and  quad leading to increased stress to medial PFJ. The patient will benefit from skilled outpatient Physical Therapy to address the deficits stated above and in the chart below, provide pt/family education, and to maximize pt's level of independence. Pt prognosis is Good.     Plan of care discussed with patient: Yes  Pt's spiritual, cultural and educational needs considered and patient is agreeable to the plan of care and goals as stated below:       Anticipated Barriers for therapy: none      Medical Necessity is demonstrated by the following  History  Co-morbidities and personal factors that may impact the plan of care [x] LOW: no personal factors / co-morbidities  [] MODERATE: 1-2 personal factors / co-morbidities  [] HIGH: 3+ personal factors / co-morbidities    Moderate / High Support Documentation:   Co-morbidities affecting plan of care: none    Personal Factors:   age     Examination  Body Structures and Functions, activity limitations and participation restrictions that may impact the plan of care [x] LOW: addressing 1-2 elements  [] MODERATE: 3+ elements  [] HIGH: 4+ elements (please support below)    Moderate / High Support Documentation: none     Clinical Presentation [x] LOW: stable  [] MODERATE: Evolving  [] HIGH: Unstable     Decision Making/ Complexity Score: low       Goals:  Short Term Goals: 2-4 weeks  1. Pt will be compliant with HEP 50% of prescribed amount.   2. The pt to demo improvement in hip strength through MMT by 1/3 grade to demo improvement in neuroms control  3.  The patient to demo proper chair pose with less knee valgus to dec stress to PFJ with no cueing 5x     Long Term Goals: 12 weeks   Pt will be compliant with % of prescribed amount.   The patient to demo ability to complete lateral bounding 10x ea side without knee pain and in good for and control  The patient to demo tolerance to SL hopping 20x B without knee pain and good technique with no knee valgus   The pt will report  full participation in ADLs and IADLs without restrictions related to R knee.     Plan   Plan of care Certification: 11/8/2023 to 1/31/2024.    Outpatient Physical Therapy 2 times weekly for 12 weeks to include the following interventions: Manual Therapy, Neuromuscular Re-ed, Patient Education, Therapeutic Activities, and Therapeutic Exercise.     Marce Parker, PT , DPT, SCS, FAAMOMPT

## 2023-11-13 ENCOUNTER — CLINICAL SUPPORT (OUTPATIENT)
Dept: REHABILITATION | Facility: HOSPITAL | Age: 73
End: 2023-11-13
Payer: MEDICARE

## 2023-11-13 DIAGNOSIS — R53.1 WEAKNESS: Primary | ICD-10-CM

## 2023-11-13 NOTE — PROGRESS NOTES
"[facility]   Physical Therapy Treatment Note     Name: Saritha Guzman  Clinic Number: [id number]    Therapy Diagnosis: No diagnosis found.  Physician: Micaela Sykes MD    Visit Date: 11/13/2023    Physician Orders: PT Eval and Treat  Medical Diagnosis from Referral: M17.11 (ICD-10-CM) - Primary osteoarthritis of right knee  Evaluation Date: 11/8/2023  Authorization Period Expiration: 12/31/2023  Plan of Care Expiration: 12/31/2023  Progress Note Due: 12/09/2023  FOTO: 1/1  Visit # / Visits authorized: 1/ 20     Time In: 1000  Time Out: 1100  Total Billable Time: 21 minutes     Precautions: Standard      SUBJECTIVE     Pt reports: she is feeling sore in her knee, did a lot of stairs this weekend .    She was compliant with home exercise program.  Response to previous treatment: improvement in understanding knee pain   Functional change: na     Pain: 3/10  Location: bilateral knee      OBJECTIVE     Dec fat pad mobility sup/inf, dec med/lat ptella mobility, dec tibial lateral mob R     Treatment       Saritha received the treatments listed below:      Therapeutic exercises to develop ROM for 08 minutes including:  Knee Extension prop 8m 4# ankle weights above and below     Manual therapy techniques: Joint mobilizations were applied to the: R knee for 08 minutes, including:  Patella mobs all directions grade III-IV   Tibial lateral mobs grade III   Fat pad mobilziaitons     Therapeutic activities to improve functional performance for 18  minutes, including:  Step Ups 4" 3x8 B   Shuttle DL with band around knees 2 cords 3x10     Neuromuscular re-education activities to improve: motor control for 29 minutes. The following activities were included:  Lateral walks no band 1x <->   Quad sets 5s holds 3m   SLR supine 2x10 ea   LAQ 0# 30x  ea            Patient Education and Home Exercises     Home Exercises Provided and Patient Education Provided     Education provided:   - inc awareness when walking " up stairs for hip ABD moment     Written Home Exercises Provided: Patient instructed to cont prior HEP. Exercises were reviewed and Saritha was able to demonstrate them prior to the end of the session.  Saritha demonstrated good  understanding of the education provided. See EMR under Patient Instructions for exercises provided during therapy sessions    ASSESSMENT     The patient responded well to visual and verbal feedback for technique in exercises, difficutly with quad set due to motor control and HS flexibility. Good technique noted on shuttle compared to step up.     Saritha Is progressing well towards her goals.     Pt prognosis is Good.     Pt will continue to benefit from skilled outpatient physical therapy to address the deficits listed in the problem list box on initial evaluation, provide pt/family education and to maximize pt's level of independence in the home and community environment.     Pt's spiritual, cultural and educational needs considered and pt agreeable to plan of care and goals.     Anticipated barriers to physical therapy: none     Goals:  Short Term Goals: 2-4 weeks  1. Pt will be compliant with HEP 50% of prescribed amount.   2. The pt to demo improvement in hip strength through MMT by 1/3 grade to demo improvement in neuroms control  3.  The patient to demo proper chair pose with less knee valgus to dec stress to PFJ with no cueing 5x      Long Term Goals: 12 weeks   Pt will be compliant with % of prescribed amount.   The patient to demo ability to complete lateral bounding 10x ea side without knee pain and in good for and control  The patient to demo tolerance to SL hopping 20x B without knee pain and good technique with no knee valgus   The pt will report full participation in ADLs and IADLs without restrictions related to R knee.        PLAN     Focus on quad control and glute med activation in WB position    Marce Parker, PT PT, DPT, SCS, FAAOMPT

## 2023-11-14 ENCOUNTER — OFFICE VISIT (OUTPATIENT)
Dept: INTERNAL MEDICINE | Facility: CLINIC | Age: 73
End: 2023-11-14
Payer: MEDICARE

## 2023-11-14 VITALS — BODY MASS INDEX: 18.78 KG/M2 | WEIGHT: 106 LBS | HEIGHT: 63 IN

## 2023-11-14 DIAGNOSIS — R94.4 DECREASED GFR: Primary | ICD-10-CM

## 2023-11-14 DIAGNOSIS — Z85.3 HISTORY OF BREAST CANCER: ICD-10-CM

## 2023-11-14 PROCEDURE — 99999 PR PBB SHADOW E&M-EST. PATIENT-LVL II: ICD-10-PCS | Mod: PBBFAC,,, | Performed by: INTERNAL MEDICINE

## 2023-11-14 PROCEDURE — 99499 UNLISTED E&M SERVICE: CPT | Mod: S$PBB,,, | Performed by: INTERNAL MEDICINE

## 2023-11-14 PROCEDURE — 99999 PR PBB SHADOW E&M-EST. PATIENT-LVL II: CPT | Mod: PBBFAC,,, | Performed by: INTERNAL MEDICINE

## 2023-11-14 PROCEDURE — 99499 NO LOS: ICD-10-PCS | Mod: S$PBB,,, | Performed by: INTERNAL MEDICINE

## 2023-11-14 PROCEDURE — 99212 OFFICE O/P EST SF 10 MIN: CPT | Mod: PBBFAC | Performed by: INTERNAL MEDICINE

## 2023-11-16 NOTE — PROGRESS NOTES
Subjective:       Patient ID: aSritha Guzman is a 73 y.o. female who presents today for:    Chief Complaint:   Chief Complaint   Patient presents with    Annual Exam     Wants physical       HPI:  Patient here for the remainder of her physical exam    Review of Systems   Constitutional:  Negative for chills, fever and weight loss.   HENT:  Negative for sore throat.    Eyes:  Negative for blurred vision and double vision.   Respiratory:  Negative for cough and shortness of breath.    Cardiovascular:  Negative for chest pain and palpitations.   Gastrointestinal:  Negative for constipation, diarrhea, nausea and vomiting.   Genitourinary:  Negative for dysuria and hematuria.   Musculoskeletal:  Negative for joint pain and myalgias.   Skin:  Negative for itching and rash.   Neurological:  Negative for sensory change, focal weakness and headaches.   Endo/Heme/Allergies:  Does not bruise/bleed easily.   Psychiatric/Behavioral:  Negative for depression and suicidal ideas.         Medications:  Outpatient Encounter Medications as of 11/14/2023   Medication Sig Dispense Refill    ALPRAZolam (XANAX) 0.25 MG tablet Take 1 tablet (0.25 mg total) by mouth daily as needed for Anxiety. (Patient not taking: Reported on 10/26/2023) 15 tablet 0    atorvastatin (LIPITOR) 20 MG tablet Take 1 tablet (20 mg total) by mouth once daily. 90 tablet 3    biotin 1,000 mcg Chew Take by mouth.      calcium-vitamin D3 (OS-CHIN 500 + D3) 500 mg-5 mcg (200 unit) per tablet Take 1 tablet by mouth 2 (two) times daily with meals.      cholecalciferol, vitamin D3, (VITAMIN D3) 25 mcg (1,000 unit) capsule Take 2,000 Units by mouth once daily.      coenzyme Q10 200 mg capsule Take 200 mg by mouth once daily.      cyanocobalamin (VITAMIN B-12) 1000 MCG tablet Take 1,000 mcg by mouth once daily.      diclofenac sodium (VOLTAREN) 1 % Gel Apply 2 g topically 4 (four) times daily. Apply to knee 100 g 2    ezetimibe (ZETIA) 10 mg tablet Take 1 tablet (10 mg  total) by mouth once daily. 90 tablet 0    INTRAROSA 6.5 mg Inst USE 1 INSERT VAGINALLY THREE TIMES A WEEK 30 each 11    LORazepam (ATIVAN) 1 MG tablet Take 1 tablet (1 mg total) by mouth every 12 (twelve) hours as needed. (Patient not taking: Reported on 10/26/2023) 30 tablet 2    suvorexant (BELSOMRA) 5 mg Tab Take 5 mg by mouth nightly as needed. (Patient not taking: Reported on 10/30/2023) 30 tablet 2     No facility-administered encounter medications on file as of 11/14/2023.       Allergies:  Review of patient's allergies indicates:   Allergen Reactions    Codeine     Corticosteroids (glucocorticoids)      Oral version puts in manic state    Penicillins     Tetracyclines Nausea And Vomiting       Health Maintenance:  Immunization History   Administered Date(s) Administered    COVID-19, MRNA, LN-S, PF (Pfizer) (Gray Cap) 04/05/2022    COVID-19, MRNA, LN-S, PF (Pfizer) (Purple Cap) 01/05/2021, 01/26/2021, 09/15/2021    COVID-19, mRNA, LNP-S, bivalent booster, PF (PFIZER OMICRON) 10/19/2022    DTaP 01/26/2012    Hepatitis A 01/26/2012    Hepatitis A, Pediatric/Adolescent, 2 Dose 01/26/2012    Influenza 09/22/2019, 10/09/2023    Influenza (FLUAD) - Quadrivalent - Adjuvanted - PF *Preferred* (65+) 10/08/2021, 09/22/2022    Influenza (FLUAD) - Trivalent - Adjuvanted - PF (65+) 09/22/2019    Influenza - High Dose - PF (65 years and older) 09/21/2015, 09/17/2016, 08/24/2017, 08/25/2017, 09/22/2018, 08/17/2020    Influenza - Quadrivalent - PF *Preferred* (6 months and older) 08/14/2014, 08/17/2020    Influenza - Trivalent - PF (ADULT) 08/17/2020    Influenza A (H1N1) 2009 Monovalent - IM 11/23/2009    Influenza Split 08/14/2014, 09/21/2015    Influenza Whole 08/23/2010, 08/22/2013    Pneumococcal Conjugate - 13 Valent 07/31/2017    Pneumococcal Polysaccharide - 23 Valent 01/05/2010, 12/14/2010    RSV IGIV 09/25/2023    Rsv, Bivalent, Rsvpref (Abrysvo) 09/25/2023    Tdap 05/05/2022    Tetanus 05/05/2022    Yellow Fever  "07/31/2017    Zoster 10/22/2008    Zoster Recombinant 07/18/2018, 07/21/2018, 07/21/2018, 09/29/2018, 09/29/2018      Health Maintenance   Topic Date Due    Hepatitis C Screening  Never done    Mammogram  08/03/2024    High Dose Statin  10/30/2024    DEXA Scan  11/22/2024    Lipid Panel  10/26/2028    TETANUS VACCINE  05/05/2032    Shingles Vaccine  Completed          Objective:      Height and Weight  Height: 5' 3" (160 cm)  Weight: 48.1 kg (106 lb)  BSA (Calculated - sq m): 1.46 sq meters  BMI (Calculated): 18.8  Weight in (lb) to have BMI = 25: 140.8]    Physical Exam  Vitals and nursing note reviewed.   Constitutional:       Appearance: Normal appearance. She is well-developed.   HENT:      Head: Normocephalic and atraumatic.   Eyes:      General: No scleral icterus.        Right eye: No discharge.         Left eye: No discharge.      Conjunctiva/sclera: Conjunctivae normal.   Neck:      Vascular: No JVD.   Cardiovascular:      Rate and Rhythm: Normal rate and regular rhythm.      Heart sounds: No murmur heard.     No friction rub. No gallop.   Pulmonary:      Effort: Pulmonary effort is normal.      Breath sounds: Normal breath sounds. No wheezing or rales.   Abdominal:      General: Bowel sounds are normal. There is no distension.      Palpations: Abdomen is soft.      Tenderness: There is no abdominal tenderness.   Musculoskeletal:         General: No tenderness. Normal range of motion.      Cervical back: Normal range of motion and neck supple.   Lymphadenopathy:      Cervical: No cervical adenopathy.   Skin:     General: Skin is warm and dry.      Findings: No erythema or rash.      Comments: Breast exam performed ; no nodules very minimal cyst on right   Neurological:      Mental Status: She is alert and oriented to person, place, and time.      Cranial Nerves: No cranial nerve deficit.   Psychiatric:         Mood and Affect: Mood normal.         Behavior: Behavior normal.         Thought Content: Thought " content normal.         Judgment: Judgment normal.          Assessment/plan:     Saritha Guzman is a 73 y.o.female with:    There are no diagnoses linked to this encounter.    Future Appointments   Date Time Provider Department Center   11/20/2023 10:00 AM Arianne Funes PTA [facility] [place]   11/27/2023 10:00 AM Marce Parker, PT [facility] [place]   11/27/2023  2:20 PM [facility] DEXA1 [facility]   11/29/2023 10:00 AM Parul Brian MD [facility] HAND [facility]   12/4/2023 10:00 AM Arainne Funes PTA [facility] [place]   12/11/2023 10:00 AM Marce Parker, PT [facility] [place]   1/16/2024  9:00 AM [facility] MRI1 350 LB LIMIT [facility] MRI [facility]       Lisa Kelley MD  [facility]

## 2023-11-20 ENCOUNTER — CLINICAL SUPPORT (OUTPATIENT)
Dept: REHABILITATION | Facility: HOSPITAL | Age: 73
End: 2023-11-20
Payer: MEDICARE

## 2023-11-20 DIAGNOSIS — R53.1 WEAKNESS: Primary | ICD-10-CM

## 2023-11-20 PROCEDURE — 97140 MANUAL THERAPY 1/> REGIONS: CPT | Mod: CQ

## 2023-11-20 PROCEDURE — 97530 THERAPEUTIC ACTIVITIES: CPT | Mod: CQ

## 2023-11-20 PROCEDURE — 97112 NEUROMUSCULAR REEDUCATION: CPT | Mod: CQ

## 2023-11-20 NOTE — PROGRESS NOTES
"[facility]   Physical Therapy Treatment Note     Name: Saritha Guzman  Clinic Number: [id number]    Therapy Diagnosis:   Encounter Diagnosis   Name Primary?    Weakness Yes     Physician: Micaela Sykes MD    Visit Date: 11/20/2023    Physician Orders: PT Eval and Treat  Medical Diagnosis from Referral: M17.11 (ICD-10-CM) - Primary osteoarthritis of right knee  Evaluation Date: 11/8/2023  Authorization Period Expiration: 12/31/2023  Plan of Care Expiration: 12/31/2023  Progress Note Due: 12/09/2023  FOTO: 1/1  Visit # / Visits authorized: 2/ 20     Time In: 1000  Time Out: 1100  Total Billable Time: 60 minutes     Precautions: Standard      SUBJECTIVE     Pt reports: knee is feeling better from previous session    She was compliant with home exercise program.  Response to previous treatment: improvement in understanding knee pain   Functional change: na     Pain: 3/10  Location: bilateral knee      OBJECTIVE     Dec fat pad mobility sup/inf, dec med/lat ptella mobility, dec tibial lateral mob R     Treatment       Saritha received the treatments listed below:      Therapeutic exercises to develop ROM for 08 minutes including:  Knee Extension prop 8m 4# ankle weights above and below     Manual therapy techniques: Joint mobilizations were applied to the: R knee for 08 minutes, including:  Patella mobs all directions grade III-IV   Tibial lateral mobs grade III   Fat pad mobilziaitons     Therapeutic activities to improve functional performance for 15 minutes, including:  Step Ups 4" 3x8 B -NPT 2* anterior knee symptoms  Shuttle DL with band around knees 2 cords 4x8  Shuttle single leg sidelying #25 3x10    Neuromuscular re-education activities to improve: motor control for 29 minutes. The following activities were included:  Lateral walks YTB 2x jump line knees ext  Quad sets 5s holds 30x with strap into hyperext  SLR supine 3x12 ea   LAQ 0# 4x10 Tempo 3/3/3          Patient Education and Home " Exercises     Home Exercises Provided and Patient Education Provided     Education provided:   - inc awareness when walking up stairs for hip ABD moment     Written Home Exercises Provided: Patient instructed to cont prior HEP. Exercises were reviewed and Saritha was able to demonstrate them prior to the end of the session.  Saritha demonstrated good  understanding of the education provided. See EMR under Patient Instructions for exercises provided during therapy sessions    ASSESSMENT     Saritha started session off stiff into hyperextension which improved with manual and LLLD stretch. Unable to perform step up today 2* to pt reporting anterior knee stress. Needs to cont to work on hip strengthening and endurance to improve valgus during functional squatting type activities.     Saritha Is progressing well towards her goals.     Pt prognosis is Good.     Pt will continue to benefit from skilled outpatient physical therapy to address the deficits listed in the problem list box on initial evaluation, provide pt/family education and to maximize pt's level of independence in the home and community environment.     Pt's spiritual, cultural and educational needs considered and pt agreeable to plan of care and goals.     Anticipated barriers to physical therapy: none     Goals:  Short Term Goals: 2-4 weeks  1. Pt will be compliant with HEP 50% of prescribed amount.   2. The pt to demo improvement in hip strength through MMT by 1/3 grade to demo improvement in neuroms control  3.  The patient to demo proper chair pose with less knee valgus to dec stress to PFJ with no cueing 5x      Long Term Goals: 12 weeks   Pt will be compliant with % of prescribed amount.   The patient to demo ability to complete lateral bounding 10x ea side without knee pain and in good for and control  The patient to demo tolerance to SL hopping 20x B without knee pain and good technique with no knee valgus   The pt will report full participation in  ADLs and IADLs without restrictions related to R knee.        PLAN     Focus on quad control and glute med activation in WB position    Arianne Funes, PTA

## 2023-11-27 ENCOUNTER — CLINICAL SUPPORT (OUTPATIENT)
Dept: REHABILITATION | Facility: HOSPITAL | Age: 73
End: 2023-11-27
Payer: MEDICARE

## 2023-11-27 ENCOUNTER — HOSPITAL ENCOUNTER (OUTPATIENT)
Dept: RADIOLOGY | Facility: CLINIC | Age: 73
Discharge: HOME | End: 2023-11-27
Attending: INTERNAL MEDICINE
Payer: MEDICARE

## 2023-11-27 DIAGNOSIS — R53.1 WEAKNESS: Primary | ICD-10-CM

## 2023-11-27 DIAGNOSIS — M79.641 RIGHT HAND PAIN: Primary | ICD-10-CM

## 2023-11-27 DIAGNOSIS — M81.8 OSTEOPOROSIS, IDIOPATHIC: ICD-10-CM

## 2023-11-27 PROCEDURE — 77080 DXA BONE DENSITY AXIAL: CPT | Mod: TC

## 2023-11-27 PROCEDURE — 97112 NEUROMUSCULAR REEDUCATION: CPT

## 2023-11-27 PROCEDURE — 97530 THERAPEUTIC ACTIVITIES: CPT

## 2023-11-27 PROCEDURE — 77080 DXA BONE DENSITY AXIAL SKELETON 1 OR MORE SITES: ICD-10-PCS | Mod: 26,,, | Performed by: INTERNAL MEDICINE

## 2023-11-27 PROCEDURE — 97140 MANUAL THERAPY 1/> REGIONS: CPT

## 2023-11-27 PROCEDURE — 77080 DXA BONE DENSITY AXIAL: CPT | Mod: 26,,, | Performed by: INTERNAL MEDICINE

## 2023-11-27 NOTE — PROGRESS NOTES
"[facility]   Physical Therapy Treatment Note     Name: Saritha Guzman  Clinic Number: [id number]    Therapy Diagnosis:   Encounter Diagnosis   Name Primary?    Weakness Yes     Physician: Micaela Sykes MD    Visit Date: 11/27/2023    Physician Orders: PT Eval and Treat  Medical Diagnosis from Referral: M17.11 (ICD-10-CM) - Primary osteoarthritis of right knee  Evaluation Date: 11/8/2023  Authorization Period Expiration: 12/31/2023  Plan of Care Expiration: 12/31/2023  Progress Note Due: 12/09/2023  FOTO: 1/1  Visit # / Visits authorized: 3/ 20     Time In: 1005  Time Out: 1100  Total Billable Time: 55 minutes     Precautions: Standard      SUBJECTIVE     Pt reports: she does feel like her knee is improving, cont to hurt with going down stairs     She was compliant with home exercise program.  Response to previous treatment: improvement in understanding knee pain   Functional change: able to play pickleball with no pain     Pain: 3/10  Location: bilateral knee      OBJECTIVE     Dec fat pad mobility sup/inf, dec med/lat ptella mobility, dec tibial lateral mob R     Treatment       Saritha received the treatments listed below:      Therapeutic exercises to develop ROM for 10 minutes including:  Knee Extension prop 10m 4# ankle weights above and below     Manual therapy techniques: Joint mobilizations were applied to the: R knee for 08 minutes, including:  Patella mobs all directions grade III-IV   Tibial lateral mobs grade III   Fat pad mobilziaitons     Therapeutic activities to improve functional performance for 22 minutes, including:  Step Ups 4" 3x8 B -NPT 2* anterior knee symptoms- NPT  Shuttle DL with band around knees 2 cords 4x8  Shuttle single leg #25 3x10  Hip Hinge 20x with dowel     Neuromuscular re-education activities to improve: motor control for 15 minutes. The following activities were included:  Lateral walks YTB 2x jump line knees ext- NPT  LAQ 4# 3x8 Tempo 3/3/3  SL hip " hinge 3x5 B with HHA        Patient Education and Home Exercises     Home Exercises Provided and Patient Education Provided     Education provided:   - inc awareness when walking up stairs for hip ABD moment     Written Home Exercises Provided: Patient instructed to cont prior HEP. Exercises were reviewed and Saritha was able to demonstrate them prior to the end of the session.  Saritha demonstrated good  understanding of the education provided. See EMR under Patient Instructions for exercises provided during therapy sessions    ASSESSMENT     Focused on improving HS length through dynamic movement (hip hinge), good tolerance to movement, difficulty noted with SL though. Improvement in overall movement quality, cont to present to PT initially with 5 deg of extension loss R knee.     Saritha Is progressing well towards her goals.     Pt prognosis is Good.     Pt will continue to benefit from skilled outpatient physical therapy to address the deficits listed in the problem list box on initial evaluation, provide pt/family education and to maximize pt's level of independence in the home and community environment.     Pt's spiritual, cultural and educational needs considered and pt agreeable to plan of care and goals.     Anticipated barriers to physical therapy: none     Goals:  Short Term Goals: 2-4 weeks  1. Pt will be compliant with HEP 50% of prescribed amount.   2. The pt to demo improvement in hip strength through MMT by 1/3 grade to demo improvement in neuroms control  3.  The patient to demo proper chair pose with less knee valgus to dec stress to PFJ with no cueing 5x      Long Term Goals: 12 weeks   Pt will be compliant with % of prescribed amount.   The patient to demo ability to complete lateral bounding 10x ea side without knee pain and in good for and control  The patient to demo tolerance to SL hopping 20x B without knee pain and good technique with no knee valgus   The pt will report full participation  in ADLs and IADLs without restrictions related to R knee.        PLAN     Focus on quad control and glute med activation in WB position    Marce Parker, PT

## 2023-11-29 ENCOUNTER — OFFICE VISIT (OUTPATIENT)
Dept: ORTHOPEDICS | Facility: CLINIC | Age: 73
End: 2023-11-29
Payer: MEDICARE

## 2023-11-29 DIAGNOSIS — M19.042 PRIMARY OSTEOARTHRITIS OF BOTH HANDS: Primary | ICD-10-CM

## 2023-11-29 DIAGNOSIS — M18.11 PRIMARY OSTEOARTHRITIS OF FIRST CARPOMETACARPAL JOINT OF RIGHT HAND: ICD-10-CM

## 2023-11-29 DIAGNOSIS — M19.041 PRIMARY OSTEOARTHRITIS OF BOTH HANDS: Primary | ICD-10-CM

## 2023-11-29 DIAGNOSIS — M67.431 GANGLION CYST OF VOLAR ASPECT OF RIGHT WRIST: ICD-10-CM

## 2023-11-29 PROCEDURE — 99999 PR PBB SHADOW E&M-EST. PATIENT-LVL III: CPT | Mod: PBBFAC,,, | Performed by: ORTHOPAEDIC SURGERY

## 2023-11-29 PROCEDURE — 99213 OFFICE O/P EST LOW 20 MIN: CPT | Mod: PBBFAC | Performed by: ORTHOPAEDIC SURGERY

## 2023-11-29 PROCEDURE — 99999 PR PBB SHADOW E&M-EST. PATIENT-LVL III: ICD-10-PCS | Mod: PBBFAC,,, | Performed by: ORTHOPAEDIC SURGERY

## 2023-11-29 PROCEDURE — 99204 OFFICE O/P NEW MOD 45 MIN: CPT | Mod: S$PBB,,, | Performed by: ORTHOPAEDIC SURGERY

## 2023-11-29 PROCEDURE — 99204 PR OFFICE/OUTPT VISIT, NEW, LEVL IV, 45-59 MIN: ICD-10-PCS | Mod: S$PBB,,, | Performed by: ORTHOPAEDIC SURGERY

## 2023-11-30 NOTE — PROGRESS NOTES
[facility]  History & Physical  Orthopedics    SUBJECTIVE:      COVID-19 attestation:  This patient was treated during the COVID-19 pandemic.  This was discussed with the patient, they are aware of our current policies and procedures, were given the option of delaying their visit and or switching to a virtual visit, delaying their surgery when applicable, and they elect to proceed.    Chief Complaint:   Chief Complaint   Patient presents with    Right Wrist - Pain       Referring Provider: Lisa Yadav MD     History of Present Illness:  Patient is a 73 y.o. right hand dominant female who presents today with complaints of right wrist mass for 2 months. She denies any pain or trauma. She is concerned about the size of the mass. She denies numbness or tingling. She also notes nodules at the DIP joints of her fingers.     The patient is a retired .    The patient rates their pain as a 0/10.    Attempted treatment(s) and/or interventions include rest and activity modification.     The patient denies any fevers, chills, N/V, D/C and presents for evaluation.       Past Medical History:   Diagnosis Date    Atypical ductal hyperplasia, breast 2008    Left    Breast CA 07/2013    left LCIS on bx.'s s/p chemo, masectomy    Family history of stroke     Fibrocystic breast     Goiter     History of endometriosis     Hyperlipidemia     Lobular carcinoma in situ     PONV (postoperative nausea and vomiting)      Past Surgical History:   Procedure Laterality Date    APPENDECTOMY      3 yrs --- had polio    BREAST BIOPSY Left 06/2013    Core bx, LCIS    BREAST BIOPSY Left 07/2013    MRI Core bx, LCIS    BREAST BIOPSY Left 2008    Core bx, ADH    BREAST BIOPSY Left 2008    Excisional bx., ADH    BREAST BIOPSY Left 06/29/2021    muscle with suture    BREAST CYST ASPIRATION      BREAST LUMPECTOMY Left 07/2013     [facility]    BREAST RECONSTRUCTION      COLONOSCOPY N/A 09/29/2020    Procedure:  COLONOSCOPY;  Surgeon: Mundo Parra MD;  Location: [facility] ([address] [place] [address]);  Service: Endoscopy;  Laterality: N/A;  COVID test on 9/26 [place]    FACELIFT      HYSTERECTOMY      partial hysterectomy late 20's    MASTECTOMY Left 2013         SINUS SURGERY      tonsillectomy       Review of patient's allergies indicates:   Allergen Reactions    Codeine     Corticosteroids (glucocorticoids)      Oral version puts in manic state    Penicillins     Tetracyclines Nausea And Vomiting     Social History     Social History Narrative    Not on file     Family History   Problem Relation Age of Onset    Stroke Mother     Alcohol abuse Father     Diabetes Father     Depression Sister     No Known Problems Brother     Alcohol abuse Brother     Depression Brother     Stroke Maternal Aunt     No Known Problems Maternal Uncle     No Known Problems Paternal Aunt     No Known Problems Paternal Uncle     Cancer Maternal Grandmother         spine    Stroke Maternal Grandmother     Cancer Maternal Grandfather         jaw    No Known Problems Paternal Grandmother     No Known Problems Paternal Grandfather     Thyroid cancer Neg Hx     Breast cancer Neg Hx     Ovarian cancer Neg Hx     Amblyopia Neg Hx     Blindness Neg Hx     Cataracts Neg Hx     Glaucoma Neg Hx     Hypertension Neg Hx     Macular degeneration Neg Hx     Retinal detachment Neg Hx     Strabismus Neg Hx     Thyroid disease Neg Hx     Colon cancer Neg Hx          Current Outpatient Medications:     ALPRAZolam (XANAX) 0.25 MG tablet, Take 1 tablet (0.25 mg total) by mouth daily as needed for Anxiety. (Patient not taking: Reported on 10/26/2023), Disp: 15 tablet, Rfl: 0    atorvastatin (LIPITOR) 20 MG tablet, Take 1 tablet (20 mg total) by mouth once daily., Disp: 90 tablet, Rfl: 3    biotin 1,000 mcg Chew, Take by mouth., Disp: , Rfl:     calcium-vitamin D3 (OS-CHIN 500 + D3) 500 mg-5 mcg (200 unit) per tablet, Take 1 tablet by mouth 2 (two) times daily with meals., Disp: ,  Rfl:     cholecalciferol, vitamin D3, (VITAMIN D3) 25 mcg (1,000 unit) capsule, Take 2,000 Units by mouth once daily., Disp: , Rfl:     coenzyme Q10 200 mg capsule, Take 200 mg by mouth once daily., Disp: , Rfl:     cyanocobalamin (VITAMIN B-12) 1000 MCG tablet, Take 1,000 mcg by mouth once daily., Disp: , Rfl:     diclofenac sodium (VOLTAREN) 1 % Gel, Apply 2 g topically 4 (four) times daily. Apply to knee, Disp: 100 g, Rfl: 2    ezetimibe (ZETIA) 10 mg tablet, Take 1 tablet (10 mg total) by mouth once daily., Disp: 90 tablet, Rfl: 0    INTRAROSA 6.5 mg Inst, USE 1 INSERT VAGINALLY THREE TIMES A WEEK, Disp: 30 each, Rfl: 11    LORazepam (ATIVAN) 1 MG tablet, Take 1 tablet (1 mg total) by mouth every 12 (twelve) hours as needed. (Patient not taking: Reported on 10/26/2023), Disp: 30 tablet, Rfl: 2    suvorexant (BELSOMRA) 5 mg Tab, Take 5 mg by mouth nightly as needed. (Patient not taking: Reported on 10/30/2023), Disp: 30 tablet, Rfl: 2    ROS    Review of Systems:  Constitutional: no fever or chills  Eyes: no visual changes  ENT: no nasal congestion or sore throat  Respiratory: no cough or shortness of breath  Cardiovascular: no chest pain  Gastrointestinal: no nausea or vomiting, tolerating diet  Musculoskeletal:  mass    OBJECTIVE:      Vital Signs (Most Recent):  There were no vitals filed for this visit.  There is no height or weight on file to calculate BMI.    Physical Exam    Physical Exam:  Constitutional: The patient appears well-developed and well-nourished. No distress.   Head: Normocephalic and atraumatic.   Nose: Nose normal.   Eyes: Conjunctivae and EOM are normal.   Neck: No tracheal deviation present.   Cardiovascular: Normal rate and intact distal pulses.    Pulmonary/Chest: Effort normal. No respiratory distress.   Abdominal: There is no guarding.   Lymphatic: Negative for adenopathy   Neurological: The patient is alert.   Psychiatric: The patient has a normal mood and affect.     Bilateral  Hand/Wrist Examination:    Observation/Inspection:  Swelling  Right wrist    Deformity  Bilateral Monica's nodes  Discoloration  none     Scars   none    Atrophy  none    HAND/WRIST EXAMINATION:  Finkelstein's Test   Neg  WHAT Test    Neg  Snuff box tenderness   Neg  Gao's Test    Neg  Hook of Hamate Tenderness  Neg  CMC grind    Neg  Circumduction test   Neg  TFCC Compression Test  Neg    Right wrist volar/ulnar cyst 3x3 cm, compressible, mobile  Decreased right thumb CMC motion and finger DIP joint motion otherwise ROM hand/wrist/elbow full    Neurovascular Exam:  Digits WWP, brisk CR < 3s throughout  NVI motor/LTS to M/R/U nerves, radial pulse 2+  2+ biceps and brachioradialis reflexes  Tinel's Test - Carpal Tunnel  Neg  Tinel's Test - Cubital Tunnel  Neg  Phalen's Test    Neg  Median Nerve Compression Test Neg    Diagnostic Results:    X-rays AP, lateral and oblique right hand taken today are independently reviewed by me and show Eaton stage III basilar thumb and STT arthritis and IP joint arthritis.     ASSESSMENT/PLAN:      73 y.o. yo female with   Encounter Diagnoses   Name Primary?    Ganglion cyst of volar aspect of right wrist     Primary osteoarthritis of both hands Yes    Primary osteoarthritis of first carpometacarpal joint of right hand       Plan:  We have discussed the natural history of ganglion cysts and basilar thumb arthritis including treatment options such as splinting, oral and topical anti-inflammatories, cortisone injections and surgery. She would like to continue with conservative treatment at this time.    Follow up in 6-8 weeks, sooner for any worsening of symptoms.      The patient's pathophysiology was explained in detail with reference to x-rays, models, other visual aids as appropriate.  Treatment options were discussed in detail.  Questions were invited and answered to the patient's satisfaction. I reviewed Primary care , and other specialty's notes to better coordinate  patient's care.          Parul Brian MD    Please be aware that this note has been generated with the assistance of [employer] voice-to-text.  Please excuse any spelling or grammatical errors.

## 2023-12-04 ENCOUNTER — CLINICAL SUPPORT (OUTPATIENT)
Dept: REHABILITATION | Facility: HOSPITAL | Age: 73
End: 2023-12-04
Payer: MEDICARE

## 2023-12-04 ENCOUNTER — TELEPHONE (OUTPATIENT)
Dept: INTERNAL MEDICINE | Facility: CLINIC | Age: 73
End: 2023-12-04
Payer: MEDICARE

## 2023-12-04 DIAGNOSIS — M81.8 OSTEOPOROSIS, IDIOPATHIC: ICD-10-CM

## 2023-12-04 DIAGNOSIS — R53.1 WEAKNESS: Primary | ICD-10-CM

## 2023-12-04 DIAGNOSIS — Z85.3 HISTORY OF BREAST CANCER: Primary | ICD-10-CM

## 2023-12-04 PROCEDURE — 97112 NEUROMUSCULAR REEDUCATION: CPT | Mod: CQ

## 2023-12-04 PROCEDURE — 97530 THERAPEUTIC ACTIVITIES: CPT | Mod: CQ

## 2023-12-04 PROCEDURE — 97140 MANUAL THERAPY 1/> REGIONS: CPT | Mod: CQ

## 2023-12-04 PROCEDURE — 97110 THERAPEUTIC EXERCISES: CPT | Mod: CQ

## 2023-12-04 NOTE — PROGRESS NOTES
"[facility]   Physical Therapy Treatment Note     Name: Saritha Guzman  Clinic Number: [id number]    Therapy Diagnosis:   Encounter Diagnosis   Name Primary?    Weakness Yes     Physician: Micaela Sykes MD    Visit Date: 12/4/2023    Physician Orders: PT Eval and Treat  Medical Diagnosis from Referral: M17.11 (ICD-10-CM) - Primary osteoarthritis of right knee  Evaluation Date: 11/8/2023  Authorization Period Expiration: 12/31/2023  Plan of Care Expiration: 12/31/2023  Progress Note Due: 12/09/2023  FOTO: 2/3  Visit # / Visits authorized: 4/ 20     Time In: 1000  Time Out: 1100  Total Billable Time: 60 minutes     Precautions: Standard      SUBJECTIVE     Pt reports: she does feel like her knee is improving, cont to hurt with going down stairs     She was compliant with home exercise program.  Response to previous treatment: improvement in understanding knee pain   Functional change: able to play pickleball with no pain     Pain: 3/10  Location: bilateral knee      OBJECTIVE     Dec fat pad mobility sup/inf, dec med/lat ptella mobility, dec tibial lateral mob R     Treatment       Saritha received the treatments listed below:      Therapeutic exercises to develop ROM for 13 minutes including:  Knee Extension prop 5m 4# ankle weights above and below   QS with strap pulling into hyper extension  Pt education  Foto    Manual therapy techniques: Joint mobilizations were applied to the: R knee for 10 minutes, including:  Patella mobs all directions grade III-IV   Tibial lateral mobs grade III   Fat pad mobilziaitons     Therapeutic activities to improve functional performance for 23 minutes, including:  Step Ups 4" 3x8 B -NPT 2* anterior knee symptoms- NPT  Shuttle DL with band around knees 2 cords 4x10  Shuttle single leg #25 3x10  Hip Hinge 20x with dowel     Neuromuscular re-education activities to improve: motor control for 15 minutes. The following activities were included:  Lateral walks YTB " 2x jump line knees ext- NPT  LAQ 4# 3x8 Tempo 3/3/3  SL hip hinge 3x5 B with HHA        Patient Education and Home Exercises     Home Exercises Provided and Patient Education Provided     Education provided:   - inc awareness when walking up stairs for hip ABD moment     Written Home Exercises Provided: Patient instructed to cont prior HEP. Exercises were reviewed and Saritha was able to demonstrate them prior to the end of the session.  Saritha demonstrated good  understanding of the education provided. See EMR under Patient Instructions for exercises provided during therapy sessions    ASSESSMENT     Saritha presented today with decreased knee ext which improves with heel prop. Followed heel prop with QS with assist into hyperextension to improve AROM knee ext. Educated pt on exercises she can perform with her ankle weights at home as well as going over hip strengthening exercises she is already doing.     Saritha Is progressing well towards her goals.     Pt prognosis is Good.     Pt will continue to benefit from skilled outpatient physical therapy to address the deficits listed in the problem list box on initial evaluation, provide pt/family education and to maximize pt's level of independence in the home and community environment.     Pt's spiritual, cultural and educational needs considered and pt agreeable to plan of care and goals.     Anticipated barriers to physical therapy: none     Goals:  Short Term Goals: 2-4 weeks  1. Pt will be compliant with HEP 50% of prescribed amount.   2. The pt to demo improvement in hip strength through MMT by 1/3 grade to demo improvement in neuroms control  3.  The patient to demo proper chair pose with less knee valgus to dec stress to PFJ with no cueing 5x      Long Term Goals: 12 weeks   Pt will be compliant with % of prescribed amount.   The patient to demo ability to complete lateral bounding 10x ea side without knee pain and in good for and control  The patient to demo  tolerance to SL hopping 20x B without knee pain and good technique with no knee valgus   The pt will report full participation in ADLs and IADLs without restrictions related to R knee.        PLAN     Focus on quad control and glute med activation in WB position    Arianne Funes, PTA

## 2023-12-05 NOTE — TELEPHONE ENCOUNTER
Please reach out to our endocrine department's specifically Dr. Curran and get her a office visit for osteoporosis . She has worsening osteoporosis on Reclast for 6 years.

## 2023-12-10 NOTE — TELEPHONE ENCOUNTER
DIETER ,   Can you have you staff get this [facility] pt on your books after the first of the year.   You read her last BMD. She has a somewhat complex osteoporotic hx   Thank you !

## 2023-12-11 ENCOUNTER — CLINICAL SUPPORT (OUTPATIENT)
Dept: REHABILITATION | Facility: HOSPITAL | Age: 73
End: 2023-12-11
Payer: MEDICARE

## 2023-12-11 DIAGNOSIS — R53.1 WEAKNESS: Primary | ICD-10-CM

## 2023-12-11 PROCEDURE — 97140 MANUAL THERAPY 1/> REGIONS: CPT

## 2023-12-11 PROCEDURE — 97530 THERAPEUTIC ACTIVITIES: CPT

## 2023-12-11 PROCEDURE — 97112 NEUROMUSCULAR REEDUCATION: CPT

## 2023-12-11 NOTE — PROGRESS NOTES
"[facility]   Physical Therapy Treatment Note     Name: Saritha Guzman  Clinic Number: [id number]    Therapy Diagnosis:   Encounter Diagnosis   Name Primary?    Weakness Yes     Physician: Micaela Sykes MD    Visit Date: 12/11/2023    Physician Orders: PT Eval and Treat  Medical Diagnosis from Referral: M17.11 (ICD-10-CM) - Primary osteoarthritis of right knee  Evaluation Date: 11/8/2023  Authorization Period Expiration: 12/31/2023  Plan of Care Expiration: 12/31/2023  Progress Note Due: 12/09/2023  FOTO: 2/3  Visit # / Visits authorized: 5/ 20     Time In: 1010  Time Out: 1100  Total Billable Time: 50 minutes     Precautions: Standard      SUBJECTIVE     Pt reports: she does feel like her knee is improving, cont to hurt with going down stairs     She was compliant with home exercise program.  Response to previous treatment: improvement in understanding knee pain   Functional change: able to play pickleball with no pain     Pain: 3/10  Location: bilateral knee      OBJECTIVE     Dec fat pad mobility sup/inf, dec med/lat ptella mobility, dec tibial lateral mob R     Treatment       Saritha received the treatments listed below:      Manual therapy techniques: Joint mobilizations were applied to the: R knee for 10 minutes, including:  Patella mobs all directions grade III-IV   Tibial lateral mobs grade III   Fat pad mobilziaitons     Therapeutic activities to improve functional performance for 23 minutes, including:  Step Downs 6" 10x   Shuttle single leg #25 4x8  SL Calf Raise 3x10    Neuromuscular re-education activities to improve: motor control for 15 minutes. The following activities were included:  Lateral walks YTB 3x 30 feet mini squat   LAQ 2# 3x10 Tempo 3/3/3  Standing clamshell with HHA ytb at knees 3x10         Patient Education and Home Exercises     Home Exercises Provided and Patient Education Provided     Education provided:   - inc awareness when walking up stairs for hip ABD " moment     Written Home Exercises Provided: Patient instructed to cont prior HEP. Exercises were reviewed and Saritha was able to demonstrate them prior to the end of the session.  Saritha demonstrated good  understanding of the education provided. See EMR under Patient Instructions for exercises provided during therapy sessions    ASSESSMENT     The patient with no pain during step downs following manual today. Focus of treatment was on glute med activation in WB and ensuring dec add/IR moment of hip to dec stress to medial PFJ. The pt required sig cueing, good to fair carryover noted.     Saritha Is progressing well towards her goals.     Pt prognosis is Good.     Pt will continue to benefit from skilled outpatient physical therapy to address the deficits listed in the problem list box on initial evaluation, provide pt/family education and to maximize pt's level of independence in the home and community environment.     Pt's spiritual, cultural and educational needs considered and pt agreeable to plan of care and goals.     Anticipated barriers to physical therapy: none     Goals:  Short Term Goals: 2-4 weeks  1. Pt will be compliant with HEP 50% of prescribed amount.   2. The pt to demo improvement in hip strength through MMT by 1/3 grade to demo improvement in neuroms control  3.  The patient to demo proper chair pose with less knee valgus to dec stress to PFJ with no cueing 5x      Long Term Goals: 12 weeks   Pt will be compliant with % of prescribed amount.   The patient to demo ability to complete lateral bounding 10x ea side without knee pain and in good for and control  The patient to demo tolerance to SL hopping 20x B without knee pain and good technique with no knee valgus   The pt will report full participation in ADLs and IADLs without restrictions related to R knee.        PLAN     Focus on quad control and glute med activation in WB position    Marce Parker, PT

## 2023-12-22 DIAGNOSIS — M79.641 RIGHT HAND PAIN: Primary | ICD-10-CM

## 2023-12-22 NOTE — TELEPHONE ENCOUNTER
No care due was identified.  [facility] Embedded Care Due Messages. Reference number: [id number].   12/22/2023 3:16:28 PM CST

## 2023-12-24 NOTE — TELEPHONE ENCOUNTER
Refill Routing Note   Medication(s) are not appropriate for processing by [facility] for the following reason(s):        No active prescription written by provider    [facility] action(s):  Defer               Appointments  past 12m or future 3m with PCP    Date Provider   Last Visit   11/14/2023 Lisa Yadav MD   Next Visit   Visit date not found Lisa Yadav MD   ED visits in past 90 days: 0        Note composed:4:47 AM 12/24/2023

## 2023-12-26 RX ORDER — ATORVASTATIN CALCIUM 20 MG/1
20 TABLET, FILM COATED ORAL
Qty: 90 TABLET | Refills: 3 | Status: SHIPPED | OUTPATIENT
Start: 2023-12-26

## 2023-12-28 ENCOUNTER — CLINICAL SUPPORT (OUTPATIENT)
Dept: REHABILITATION | Facility: HOSPITAL | Age: 73
End: 2023-12-28
Payer: MEDICARE

## 2023-12-28 DIAGNOSIS — R53.1 WEAKNESS: Primary | ICD-10-CM

## 2023-12-28 PROCEDURE — 97140 MANUAL THERAPY 1/> REGIONS: CPT | Mod: CQ

## 2023-12-28 PROCEDURE — 97530 THERAPEUTIC ACTIVITIES: CPT | Mod: CQ

## 2023-12-28 PROCEDURE — 97112 NEUROMUSCULAR REEDUCATION: CPT | Mod: CQ

## 2023-12-28 NOTE — PROGRESS NOTES
"[facility]   Physical Therapy Treatment Note     Name: Saritha Guzman  Clinic Number: [id number]    Therapy Diagnosis:   Encounter Diagnosis   Name Primary?    Weakness Yes       Physician: Micaela Sykes MD    Visit Date: 12/28/2023    Physician Orders: PT Eval and Treat  Medical Diagnosis from Referral: M17.11 (ICD-10-CM) - Primary osteoarthritis of right knee  Evaluation Date: 11/8/2023  Authorization Period Expiration: 12/31/2023  Plan of Care Expiration: 12/31/2023  Progress Note Due: 12/09/2023  FOTO: 2/3  Visit # / Visits authorized: 6/ 20     Time In: 12:06 p  Time Out: 1:00 p  Total Billable Time: 54 minutes     Precautions: Standard      SUBJECTIVE     Pt reports: overall knee is better. Did get sharp anterior knee pain the other day but went away.    She was compliant with home exercise program.  Response to previous treatment: improvement in understanding knee pain   Functional change: able to play pickleball with no pain     Pain: 3/10  Location: bilateral knee      OBJECTIVE     Dec fat pad mobility sup/inf, dec med/lat ptella mobility, dec tibial lateral mob R     Treatment       Saritha received the treatments listed below:      Therapeutic Exercise: x5 min  Heel prop #5 above/below x 5 min    Manual therapy techniques: Joint mobilizations were applied to the: R knee for 08 minutes, including:  Patella mobs all directions grade III-IV   Tibial lateral mobs grade III   Fat pad mobilziaitons     Therapeutic activities to improve functional performance for 20 minutes, including:  Step Downs 6" 10x   Shuttle single leg #25 4x10  SL Calf Raise 3x10    Neuromuscular re-education activities to improve: motor control for 23 minutes. The following activities were included:  Lateral walks YTB 3x 30 feet mini squat   LAQ 2# 3x10 Tempo 3/3/3  Standing clamshell with HHA ytb at knees 3x10   QS 5" hold 30x (after heel prop)      Patient Education and Home Exercises     Home Exercises " Provided and Patient Education Provided     Education provided:   - inc awareness when walking up stairs for hip ABD moment     Written Home Exercises Provided: Patient instructed to cont prior HEP. Exercises were reviewed and Saritha was able to demonstrate them prior to the end of the session.  Saritha demonstrated good  understanding of the education provided. See EMR under Patient Instructions for exercises provided during therapy sessions    ASSESSMENT   Pt cont to show weak R quad as she is not able to actively extender her knee to 0 degrees but able to passively get there. R TF joint felt still so started session with heel prop followed by Quad sets to improve joint mobility and knee ext ROM. Cont to need cueing to prevent femoral IR/ADD with squatting movements. Educated pt about quad activation during initial contact and stance phase of gait on R LE. Updated HEP today to work on hip and quad strength to improve mechanics with functional activity.     Saritha Is progressing well towards her goals.     Pt prognosis is Good.     Pt will continue to benefit from skilled outpatient physical therapy to address the deficits listed in the problem list box on initial evaluation, provide pt/family education and to maximize pt's level of independence in the home and community environment.     Pt's spiritual, cultural and educational needs considered and pt agreeable to plan of care and goals.     Anticipated barriers to physical therapy: none     Goals:  Short Term Goals: 2-4 weeks  1. Pt will be compliant with HEP 50% of prescribed amount.   2. The pt to demo improvement in hip strength through MMT by 1/3 grade to demo improvement in neuroms control  3.  The patient to demo proper chair pose with less knee valgus to dec stress to PFJ with no cueing 5x      Long Term Goals: 12 weeks   Pt will be compliant with % of prescribed amount.   The patient to demo ability to complete lateral bounding 10x ea side without knee  pain and in good for and control  The patient to demo tolerance to SL hopping 20x B without knee pain and good technique with no knee valgus   The pt will report full participation in ADLs and IADLs without restrictions related to R knee.        PLAN     Focus on quad control and glute med activation in WB position    Arianne Funes, PTA

## 2024-01-04 ENCOUNTER — CLINICAL SUPPORT (OUTPATIENT)
Dept: REHABILITATION | Facility: HOSPITAL | Age: 74
End: 2024-01-04
Payer: MEDICARE

## 2024-01-04 DIAGNOSIS — R53.1 WEAKNESS: Primary | ICD-10-CM

## 2024-01-04 PROCEDURE — 97140 MANUAL THERAPY 1/> REGIONS: CPT | Mod: CQ

## 2024-01-04 PROCEDURE — 97530 THERAPEUTIC ACTIVITIES: CPT | Mod: CQ

## 2024-01-04 PROCEDURE — 97112 NEUROMUSCULAR REEDUCATION: CPT | Mod: CQ

## 2024-01-04 NOTE — PROGRESS NOTES
"[facility]   Physical Therapy Treatment Note     Name: Saritha Guzman  Clinic Number: [id number]    Therapy Diagnosis:   Encounter Diagnosis   Name Primary?    Weakness Yes       Physician: Micaela Sykes MD    Visit Date: 1/4/2024    Physician Orders: PT Eval and Treat  Medical Diagnosis from Referral: M17.11 (ICD-10-CM) - Primary osteoarthritis of right knee  Evaluation Date: 11/8/2023  Authorization Period Expiration: 12/31/2023  Plan of Care Expiration: 12/31/2023  Progress Note Due: 12/09/2023  FOTO: 2/3  Visit # / Visits authorized: 1/ 20     Time In: 11:05 a  Time Out: 12:00 p  Total Billable Time: 55 minutes     Precautions: Standard      SUBJECTIVE     Pt reports: knee is doing better    She was compliant with home exercise program.  Response to previous treatment: improvement in understanding knee pain   Functional change: able to play pickleball with no pain     Pain: 3/10  Location: bilateral knee      OBJECTIVE     Dec fat pad mobility sup/inf, dec med/lat ptella mobility, dec tibial lateral mob R     Treatment       Saritha received the treatments listed below:      Therapeutic Exercise: x5 min  Heel prop #5 above/below x 5 min    Manual therapy techniques: Joint mobilizations were applied to the: R knee for 08 minutes, including:  Patella mobs all directions grade III-IV   Tibial lateral mobs grade III -np  Fat pad mobilziaitons     Therapeutic activities to improve functional performance for 15 minutes, including:  Lateral Step Downs 4" 3x10  Fwd step up 4x10  Shuttle single leg #25 4x10-np  SL Calf Raise 3x10-np    Neuromuscular re-education activities to improve: motor control for 27 minutes. The following activities were included:  Lateral walks YTB 3x 30 feet mini squat   LAQ 2# 3x10 Tempo 3/3/3  Standing clamshell with HHA ytb at knees 3x10 -np  QS 5" hold with strap into hyperext x 3 min 5" hold      Patient Education and Home Exercises     Home Exercises Provided and " Patient Education Provided     Education provided:   - inc awareness when walking up stairs for hip ABD moment     Written Home Exercises Provided: Patient instructed to cont prior HEP. Exercises were reviewed and Saritha was able to demonstrate them prior to the end of the session.  Saritha demonstrated good  understanding of the education provided. See EMR under Patient Instructions for exercises provided during therapy sessions    ASSESSMENT   Some improvements with active knee ext. Still unable to full extend to 0 but improved from previous session. Cont to show hip weakness with preventing valgus with step ups. Will cont to progress quad and glute strengthening.     Saritha Is progressing well towards her goals.     Pt prognosis is Good.     Pt will continue to benefit from skilled outpatient physical therapy to address the deficits listed in the problem list box on initial evaluation, provide pt/family education and to maximize pt's level of independence in the home and community environment.     Pt's spiritual, cultural and educational needs considered and pt agreeable to plan of care and goals.     Anticipated barriers to physical therapy: none     Goals:  Short Term Goals: 2-4 weeks  1. Pt will be compliant with HEP 50% of prescribed amount.   2. The pt to demo improvement in hip strength through MMT by 1/3 grade to demo improvement in neuroms control  3.  The patient to demo proper chair pose with less knee valgus to dec stress to PFJ with no cueing 5x      Long Term Goals: 12 weeks   Pt will be compliant with % of prescribed amount.   The patient to demo ability to complete lateral bounding 10x ea side without knee pain and in good for and control  The patient to demo tolerance to SL hopping 20x B without knee pain and good technique with no knee valgus   The pt will report full participation in ADLs and IADLs without restrictions related to R knee.        PLAN     Focus on quad control and glute med  activation in WB position    Arianne Funes, PTA

## 2024-01-10 ENCOUNTER — OFFICE VISIT (OUTPATIENT)
Dept: ORTHOPEDICS | Facility: CLINIC | Age: 74
End: 2024-01-10
Payer: MEDICARE

## 2024-01-10 ENCOUNTER — HOSPITAL ENCOUNTER (OUTPATIENT)
Dept: RADIOLOGY | Facility: OTHER | Age: 74
Discharge: HOME | End: 2024-01-10
Attending: ORTHOPAEDIC SURGERY
Payer: MEDICARE

## 2024-01-10 DIAGNOSIS — M67.431 GANGLION CYST OF VOLAR ASPECT OF RIGHT WRIST: Primary | ICD-10-CM

## 2024-01-10 DIAGNOSIS — M19.041 PRIMARY OSTEOARTHRITIS OF BOTH HANDS: ICD-10-CM

## 2024-01-10 DIAGNOSIS — M19.042 PRIMARY OSTEOARTHRITIS OF BOTH HANDS: ICD-10-CM

## 2024-01-10 DIAGNOSIS — M18.11 PRIMARY OSTEOARTHRITIS OF FIRST CARPOMETACARPAL JOINT OF RIGHT HAND: ICD-10-CM

## 2024-01-10 DIAGNOSIS — M79.641 RIGHT HAND PAIN: ICD-10-CM

## 2024-01-10 PROCEDURE — 73130 X-RAY EXAM OF HAND: CPT | Mod: 26,RT,, | Performed by: RADIOLOGY

## 2024-01-10 PROCEDURE — 99999 PR PBB SHADOW E&M-EST. PATIENT-LVL III: CPT | Mod: PBBFAC,,, | Performed by: ORTHOPAEDIC SURGERY

## 2024-01-10 PROCEDURE — 99213 OFFICE O/P EST LOW 20 MIN: CPT | Mod: S$PBB,,, | Performed by: ORTHOPAEDIC SURGERY

## 2024-01-10 PROCEDURE — 99213 OFFICE O/P EST LOW 20 MIN: CPT | Mod: PBBFAC | Performed by: ORTHOPAEDIC SURGERY

## 2024-01-10 PROCEDURE — 73130 X-RAY EXAM OF HAND: CPT | Mod: TC,FY,RT

## 2024-01-10 NOTE — PROGRESS NOTES
Saritha Guzman presents for follow up evaluation of   Encounter Diagnoses   Name Primary?    Ganglion cyst of volar aspect of right wrist Yes    Primary osteoarthritis of both hands     Primary osteoarthritis of first carpometacarpal joint of right hand      Overall the patient reports doing well. She states the right wrist cyst has gotten smaller.  She states that she does not have any pain or dysfunction.  She is able to play pickleball and perform her daily activities without difficulty.    PE:    AA&O x 4.  NAD  HEENT:  NCAT, sclera nonicteric  Lungs:  Respirations are equal and unlabored.  CV:  2+ bilateral upper and lower extremity pulses.  MSK:  2 x 2 right ulnar volar ganglion cyst.  Bilateral Monica's nodes.  Neurovascularly intact bilaterally.  5/5 thenar and intrinsic musculature strength.  Full range of motion hands, wrists and elbows.    X-rays AP, lateral and oblique right hand taken today are independently reviewed by me and show Eaton stage III basilar thumb and STT arthritis and IP joint arthritis.     A/P: Status post above, doing well  1) Continue with activity as tolerated  2) F/U as needed  3) Call with any questions/concerns in the interim        Parul Brian MD    Please be aware that this note has been generated with the assistance of [facility] voice-to-text.  Please excuse any spelling or grammatical errors.

## 2024-01-11 DIAGNOSIS — Z00.00 ENCOUNTER FOR MEDICARE ANNUAL WELLNESS EXAM: ICD-10-CM

## 2024-01-16 ENCOUNTER — HOSPITAL ENCOUNTER (OUTPATIENT)
Dept: RADIOLOGY | Facility: OTHER | Age: 74
Discharge: HOME | End: 2024-01-16
Attending: NURSE PRACTITIONER
Payer: MEDICARE

## 2024-01-16 DIAGNOSIS — C50.412 MALIGNANT NEOPLASM OF UPPER-OUTER QUADRANT OF LEFT BREAST IN FEMALE, ESTROGEN RECEPTOR POSITIVE: ICD-10-CM

## 2024-01-16 DIAGNOSIS — Z17.0 MALIGNANT NEOPLASM OF UPPER-OUTER QUADRANT OF LEFT BREAST IN FEMALE, ESTROGEN RECEPTOR POSITIVE: ICD-10-CM

## 2024-01-16 PROCEDURE — C8937 CAD BREAST MRI: HCPCS | Mod: TC

## 2024-01-16 PROCEDURE — 25500020 PHARM REV CODE 255: Performed by: NURSE PRACTITIONER

## 2024-01-16 PROCEDURE — 77049 MRI BREAST C-+ W/CAD BI: CPT | Mod: 26,,, | Performed by: RADIOLOGY

## 2024-01-16 PROCEDURE — A9577 INJ MULTIHANCE: HCPCS | Performed by: NURSE PRACTITIONER

## 2024-01-16 RX ADMIN — GADOBENATE DIMEGLUMINE 10 ML: 529 INJECTION, SOLUTION INTRAVENOUS at 09:01

## 2024-01-18 ENCOUNTER — CLINICAL SUPPORT (OUTPATIENT)
Dept: REHABILITATION | Facility: HOSPITAL | Age: 74
End: 2024-01-18
Payer: MEDICARE

## 2024-01-18 DIAGNOSIS — R53.1 WEAKNESS: Primary | ICD-10-CM

## 2024-01-18 PROCEDURE — 97112 NEUROMUSCULAR REEDUCATION: CPT

## 2024-01-18 PROCEDURE — 97110 THERAPEUTIC EXERCISES: CPT

## 2024-01-18 PROCEDURE — 97530 THERAPEUTIC ACTIVITIES: CPT

## 2024-01-22 ENCOUNTER — CLINICAL SUPPORT (OUTPATIENT)
Dept: REHABILITATION | Facility: HOSPITAL | Age: 74
End: 2024-01-22
Payer: MEDICARE

## 2024-01-22 DIAGNOSIS — R53.1 WEAKNESS: Primary | ICD-10-CM

## 2024-01-22 PROCEDURE — 97110 THERAPEUTIC EXERCISES: CPT | Mod: CQ

## 2024-01-22 PROCEDURE — 97530 THERAPEUTIC ACTIVITIES: CPT | Mod: CQ

## 2024-01-22 PROCEDURE — 97112 NEUROMUSCULAR REEDUCATION: CPT | Mod: CQ

## 2024-01-22 NOTE — PROGRESS NOTES
[facility]   Physical Therapy Treatment Note     Name: Saritha Guzman  Clinic Number: [id number]    Therapy Diagnosis:   No diagnosis found.      Physician: Micaela Sykes MD    Visit Date: 1/18/2024    Physician Orders: PT Eval and Treat  Medical Diagnosis from Referral: M17.11 (ICD-10-CM) - Primary osteoarthritis of right knee  Evaluation Date: 11/8/2023  Authorization Period Expiration: 12/31/2023  Plan of Care Expiration: 12/31/2023  Progress Note Due: 12/09/2023  FOTO: 2/3  Visit # / Visits authorized: 2/ 20     Time In: 11:00  Time Out: 12:00  Total Billable Time: 60 minutes     Precautions: Standard      SUBJECTIVE     Pt reports: she doesn't know why but her knees were very sore this past weekend. Notes she has not been completing her exercises     She was compliant with home exercise program.  Response to previous treatment: improvement in understanding knee pain   Functional change: able to play pickleball with no pain     Pain: 3/10  Location: bilateral knee      OBJECTIVE     Dec fat pad mobility sup/inf, dec med/lat ptella mobility, dec tibial lateral mob R     Treatment       Saritha received the treatments listed below:      Therapeutic Exercise: x9 min  Bike x8m     Manual therapy techniques: Joint mobilizations were applied to the: R knee for 07 minutes, including:  Patella mobs all directions grade III-IV   Fat pad mobilziaitons     Therapeutic activities to improve functional performance for 16 minutes, including:  Bridge with band around knees 3x15   SL Calf Raise 3x10    Neuromuscular re-education activities to improve: motor control for 28 minutes. The following activities were included:  Lateral walks YTB 3x 30 feet mini squat   LAQ 3# 3x10 Tempo 3/3/3  Education     Patient Education and Home Exercises     Home Exercises Provided and Patient Education Provided     Education provided:   - inc awareness when walking up stairs for hip ABD moment     Written Home Exercises  Provided: Patient instructed to cont prior HEP. Exercises were reviewed and Saritha was able to demonstrate them prior to the end of the session.  Saritha demonstrated good  understanding of the education provided. See EMR under Patient Instructions for exercises provided during therapy sessions    ASSESSMENT   The patient re-advised on her HEP as she was not completing. Able to complete all exercises in session without pain and with appropriate fatigue following.  Cont to prog as glendy.     Saritha Is progressing well towards her goals.     Pt prognosis is Good.     Pt will continue to benefit from skilled outpatient physical therapy to address the deficits listed in the problem list box on initial evaluation, provide pt/family education and to maximize pt's level of independence in the home and community environment.     Pt's spiritual, cultural and educational needs considered and pt agreeable to plan of care and goals.     Anticipated barriers to physical therapy: none     Goals:  Short Term Goals: 2-4 weeks  1. Pt will be compliant with HEP 50% of prescribed amount.   2. The pt to demo improvement in hip strength through MMT by 1/3 grade to demo improvement in neuroms control  3.  The patient to demo proper chair pose with less knee valgus to dec stress to PFJ with no cueing 5x      Long Term Goals: 12 weeks   Pt will be compliant with % of prescribed amount.   The patient to demo ability to complete lateral bounding 10x ea side without knee pain and in good for and control  The patient to demo tolerance to SL hopping 20x B without knee pain and good technique with no knee valgus   The pt will report full participation in ADLs and IADLs without restrictions related to R knee.        PLAN     Focus on quad control and glute med activation in WB position    Marce Parker, PT

## 2024-01-22 NOTE — PROGRESS NOTES
"[facility]   Physical Therapy Treatment Note     Name: Saritha Guzman  Clinic Number: [id number]    Therapy Diagnosis:   Encounter Diagnosis   Name Primary?    Weakness Yes         Physician: Micaela Sykes MD    Visit Date: 1/22/2024    Physician Orders: PT Eval and Treat  Medical Diagnosis from Referral: M17.11 (ICD-10-CM) - Primary osteoarthritis of right knee  Evaluation Date: 11/8/2023  Authorization Period Expiration: 12/31/2023  Plan of Care Expiration: 12/31/2023  Progress Note Due: 12/09/2023  FOTO: 2/3  Visit # / Visits authorized: 3/ 20     Time In: 11:05  Time Out: 12:00   Total Billable Time: 55 minutes     Precautions: Standard      SUBJECTIVE     Pt reports:was compliant with HEP since previous session    She was compliant with home exercise program.  Response to previous treatment: improvement in understanding knee pain   Functional change: able to play pickleball with no pain     Pain: 3/10  Location: bilateral knee      OBJECTIVE     Dec fat pad mobility sup/inf, dec med/lat ptella mobility, dec tibial lateral mob R     Treatment       Saritha received the treatments listed below:      Therapeutic Exercise: x 10 min  Bike x10m     Manual therapy techniques: Joint mobilizations were applied to the: R knee for 08 minutes, including:  Patella mobs all directions grade III-IV   Fat pad mobilziaitons     Therapeutic activities to improve functional performance for 23 minutes, including:  Bridge with band around knees 3x15 5" hold   SL Calf Raise 4x10  SL shuttle #25 3x10 B    Neuromuscular re-education activities to improve: motor control for 14 minutes. The following activities were included:  Lateral walks YTB 3x 30 feet mini squat   LAQ 3# 3x10 Tempo 3/3/3      Patient Education and Home Exercises     Home Exercises Provided and Patient Education Provided     Education provided:   - inc awareness when walking up stairs for hip ABD moment     Written Home Exercises Provided: " Patient instructed to cont prior HEP. Exercises were reviewed and Saritha was able to demonstrate them prior to the end of the session.  Saritha demonstrated good  understanding of the education provided. See EMR under Patient Instructions for exercises provided during therapy sessions    ASSESSMENT   Saritha was compliant with her HEP between these last sessions. Noted fatigue with B LE with shuttle. Attempted to progress lateral walks with band around ankles but unable to perform 2* strength.      Saritha Is progressing well towards her goals.     Pt prognosis is Good.     Pt will continue to benefit from skilled outpatient physical therapy to address the deficits listed in the problem list box on initial evaluation, provide pt/family education and to maximize pt's level of independence in the home and community environment.     Pt's spiritual, cultural and educational needs considered and pt agreeable to plan of care and goals.     Anticipated barriers to physical therapy: none     Goals:  Short Term Goals: 2-4 weeks  1. Pt will be compliant with HEP 50% of prescribed amount.   2. The pt to demo improvement in hip strength through MMT by 1/3 grade to demo improvement in neuroms control  3.  The patient to demo proper chair pose with less knee valgus to dec stress to PFJ with no cueing 5x      Long Term Goals: 12 weeks   Pt will be compliant with % of prescribed amount.   The patient to demo ability to complete lateral bounding 10x ea side without knee pain and in good for and control  The patient to demo tolerance to SL hopping 20x B without knee pain and good technique with no knee valgus   The pt will report full participation in ADLs and IADLs without restrictions related to R knee.        PLAN     Focus on quad control and glute med activation in WB position    Arianne Funes, PTA

## 2024-01-30 ENCOUNTER — CLINICAL SUPPORT (OUTPATIENT)
Dept: REHABILITATION | Facility: HOSPITAL | Age: 74
End: 2024-01-30
Payer: MEDICARE

## 2024-01-30 DIAGNOSIS — R53.1 WEAKNESS: Primary | ICD-10-CM

## 2024-01-30 PROCEDURE — 97530 THERAPEUTIC ACTIVITIES: CPT

## 2024-01-30 PROCEDURE — 97110 THERAPEUTIC EXERCISES: CPT

## 2024-01-30 PROCEDURE — 97112 NEUROMUSCULAR REEDUCATION: CPT

## 2024-01-30 NOTE — PROGRESS NOTES
"[facility]   Physical Therapy Treatment Note     Name: Saritha Guzman  Clinic Number: [id number]    Therapy Diagnosis:   Encounter Diagnosis   Name Primary?    Weakness Yes     Physician: Micaela Skyes MD    Visit Date: 1/30/2024    Physician Orders: PT Eval and Treat  Medical Diagnosis from Referral: M17.11 (ICD-10-CM) - Primary osteoarthritis of right knee  Evaluation Date: 11/8/2023  Authorization Period Expiration: 12/31/2023  Plan of Care Expiration: 12/31/2023  Progress Note Due: 12/09/2023  FOTO: 2/3  Visit # / Visits authorized: 4/ 20     Time In: 1100  Time Out: 1200   Total Billable Time: 55 minutes     Precautions: Standard      SUBJECTIVE     Pt reports: she does feel ready to be d/c from PT     She was compliant with home exercise program.  Response to previous treatment: improvement in understanding knee pain   Functional change: able to play pickleball with no pain     Pain: 0/10  Location: bilateral knee      OBJECTIVE     Dec fat pad mobility sup/inf, dec med/lat ptella mobility, dec tibial lateral mob R     Treatment       Saritha received the treatments listed below:      Therapeutic Exercise: x 10 min  Bike x10m     Manual therapy techniques: Joint mobilizations were applied to the: R knee for 02 minutes, including:  Patella mobs all directions grade III-IV   Fat pad mobilziaitons     Therapeutic activities to improve functional performance for 23 minutes, including:  Bridge with band around knees 3x15 5" hold   SL Calf Raise 4x10  DL shuttle #25 3x10 B    Neuromuscular re-education activities to improve: motor control for 12 minutes. The following activities were included:  Lateral walks YTB 3x 30 feet mini squat - NPT   LAQ 3# 3x10 Tempo 3/3/3      Patient Education and Home Exercises     Home Exercises Provided and Patient Education Provided     Education provided:   - inc awareness when walking up stairs for hip ABD moment     Written Home Exercises Provided: Patient " instructed to cont prior HEP. Exercises were reviewed and Saritha was able to demonstrate them prior to the end of the session.  Saritha demonstrated good  understanding of the education provided. See EMR under Patient Instructions for exercises provided during therapy sessions    ASSESSMENT   The patient with good tolernace to exercises that focused on her HEP. The patient able to complete all exercises and play in pickle ball matches with little to no pain. She has met a majority of her goals and is safe to d/c from PT at this time.     Saritha Is progressing well towards her goals.     Pt prognosis is Good.     Pt will continue to benefit from skilled outpatient physical therapy to address the deficits listed in the problem list box on initial evaluation, provide pt/family education and to maximize pt's level of independence in the home and community environment.     Pt's spiritual, cultural and educational needs considered and pt agreeable to plan of care and goals.     Anticipated barriers to physical therapy: none     Goals:  Short Term Goals: 2-4 weeks  1. Pt will be compliant with HEP 50% of prescribed amount. met  2. The pt to demo improvement in hip strength through MMT by 1/3 grade to demo improvement in neuroms control met  3.  The patient to demo proper chair pose with less knee valgus to dec stress to PFJ with no cueing 5x met     Long Term Goals: 12 weeks   Pt will be compliant with % of prescribed amount. met  The patient to demo ability to complete lateral bounding 10x ea side without knee pain and in good for and control (not assessed)  The patient to demo tolerance to SL hopping 20x B without knee pain and good technique with no knee valgus (not assessed)  The pt will report full participation in ADLs and IADLs without restrictions related to R knee. met       PLAN     D/c from PT, follow-up as needed.     Marce Parker, PT

## 2024-01-31 ENCOUNTER — OFFICE VISIT (OUTPATIENT)
Dept: ENDOCRINOLOGY | Facility: CLINIC | Age: 74
End: 2024-01-31
Payer: MEDICARE

## 2024-01-31 ENCOUNTER — LAB VISIT (OUTPATIENT)
Dept: LAB | Facility: HOSPITAL | Age: 74
End: 2024-01-31
Attending: INTERNAL MEDICINE
Payer: MEDICARE

## 2024-01-31 VITALS
DIASTOLIC BLOOD PRESSURE: 80 MMHG | BODY MASS INDEX: 19.39 KG/M2 | SYSTOLIC BLOOD PRESSURE: 120 MMHG | WEIGHT: 109.44 LBS

## 2024-01-31 DIAGNOSIS — C50.412 MALIGNANT NEOPLASM OF UPPER-OUTER QUADRANT OF LEFT FEMALE BREAST, UNSPECIFIED ESTROGEN RECEPTOR STATUS: ICD-10-CM

## 2024-01-31 DIAGNOSIS — M81.0 POSTMENOPAUSAL OSTEOPOROSIS: Primary | ICD-10-CM

## 2024-01-31 DIAGNOSIS — Z85.3 HISTORY OF BREAST CANCER: ICD-10-CM

## 2024-01-31 DIAGNOSIS — M81.8 OSTEOPOROSIS, IDIOPATHIC: ICD-10-CM

## 2024-01-31 PROCEDURE — 99999 PR PBB SHADOW E&M-EST. PATIENT-LVL III: CPT | Mod: PBBFAC,,, | Performed by: INTERNAL MEDICINE

## 2024-01-31 PROCEDURE — 99204 OFFICE O/P NEW MOD 45 MIN: CPT | Mod: S$PBB,,, | Performed by: INTERNAL MEDICINE

## 2024-01-31 PROCEDURE — G2211 COMPLEX E/M VISIT ADD ON: HCPCS | Mod: S$PBB,,, | Performed by: INTERNAL MEDICINE

## 2024-01-31 PROCEDURE — 36415 COLL VENOUS BLD VENIPUNCTURE: CPT | Performed by: INTERNAL MEDICINE

## 2024-01-31 PROCEDURE — 99213 OFFICE O/P EST LOW 20 MIN: CPT | Mod: PBBFAC | Performed by: INTERNAL MEDICINE

## 2024-01-31 RX ORDER — RSV VACC, PREF A AND PREF B/PF 120MCG/0.5
VIAL (EA) INTRAMUSCULAR
COMMUNITY
Start: 2023-09-25

## 2024-01-31 NOTE — PLAN OF CARE
"[facility]   Physical Therapy Treatment & DISCHARGE Note     Name: Saritha Guzman  Clinic Number: [id number]    Therapy Diagnosis:   Encounter Diagnosis   Name Primary?    Weakness Yes     Physician: Micaela Sykes MD    Visit Date: 1/30/2024    Physician Orders: PT Eval and Treat  Medical Diagnosis from Referral: M17.11 (ICD-10-CM) - Primary osteoarthritis of right knee  Evaluation Date: 11/8/2023  Authorization Period Expiration: 12/31/2023  Plan of Care Expiration: 12/31/2023  Progress Note Due: 12/09/2023  FOTO: 2/3  Visit # / Visits authorized: 4/ 20     Time In: 1100  Time Out: 1200   Total Billable Time: 55 minutes     Precautions: Standard      SUBJECTIVE     Pt reports: she does feel ready to be d/c from PT     She was compliant with home exercise program.  Response to previous treatment: improvement in understanding knee pain   Functional change: able to play pickleball with no pain     Pain: 0/10  Location: bilateral knee      OBJECTIVE       Treatment       Saritha received the treatments listed below:      Therapeutic Exercise: x 15 min  Bike x10m   Education on HEP     Manual therapy techniques: Joint mobilizations were applied to the: R knee for 02 minutes, including:  Patella mobs all directions grade III-IV   Fat pad mobilziaitons     Therapeutic activities to improve functional performance for 23 minutes, including:  Bridge with band around knees 3x15 5" hold   SL Calf Raise 4x10  DL shuttle #25 3x10 B    Neuromuscular re-education activities to improve: motor control for 18 minutes. The following activities were included:  Lateral walks YTB 3x 30 feet mini squat - NPT   LAQ 3# 3x10 Tempo 3/3/3  Calf Raises SL 3x12      Patient Education and Home Exercises     Home Exercises Provided and Patient Education Provided     Education provided:   - inc awareness when walking up stairs for hip ABD moment     Written Home Exercises Provided: Patient instructed to cont prior HEP. " Exercises were reviewed and Saritha was able to demonstrate them prior to the end of the session.  Saritha demonstrated good  understanding of the education provided. See EMR under Patient Instructions for exercises provided during therapy sessions    ASSESSMENT   The patient with good tolernace to exercises that focused on her HEP. The patient able to complete all exercises and play in pickle ball matches with little to no pain. She has met a majority of her goals and is safe to d/c from PT at this time.     Saritha Is progressing well towards her goals.     Pt prognosis is Good.     Pt will continue to benefit from skilled outpatient physical therapy to address the deficits listed in the problem list box on initial evaluation, provide pt/family education and to maximize pt's level of independence in the home and community environment.     Pt's spiritual, cultural and educational needs considered and pt agreeable to plan of care and goals.     Anticipated barriers to physical therapy: none     Goals:  Short Term Goals: 2-4 weeks  1. Pt will be compliant with HEP 50% of prescribed amount. met  2. The pt to demo improvement in hip strength through MMT by 1/3 grade to demo improvement in neuroms control met  3.  The patient to demo proper chair pose with less knee valgus to dec stress to PFJ with no cueing 5x met     Long Term Goals: 12 weeks   Pt will be compliant with % of prescribed amount. met  The patient to demo ability to complete lateral bounding 10x ea side without knee pain and in good for and control (not assessed)  The patient to demo tolerance to SL hopping 20x B without knee pain and good technique with no knee valgus (not assessed)  The pt will report full participation in ADLs and IADLs without restrictions related to R knee. met       PLAN     D/c from PT, follow-up as needed.     Marce Parker, PT

## 2024-01-31 NOTE — PROGRESS NOTES
NEW PATIENT VISIT    Subjective:      Chief Complaint: Osteoporosis    HPI: Saritha Guzman is a 73 y.o. female who is here for osteoporosis      Past Medical History:   Diagnosis Date    Atypical ductal hyperplasia, breast 2008    Left    Breast CA 07/2013    left LCIS on bx.'s s/p chemo, masectomy    Family history of stroke     Fibrocystic breast     Goiter     History of endometriosis     Hyperlipidemia     Lobular carcinoma in situ     PONV (postoperative nausea and vomiting)      Referred by Dr. Yadav.    With regards to osteoporosis:     Diagnosed with osteoporosis in 2013 based on bone density test.    Past osteoporosis medication:   Reclast - 6 doses (2014, 2015, 2016, 2018, 2019, 2021)    Current osteoporosis medication:   None    Calcium supplements?  600 mg once daily  Consumes dairy products regularly?  Lot of cheese  Eats fresh green vegetables regularly? Plenty    Vit D3 intake?  1000 IU per day      Lab Results   Component Value Date    IOMLPMEM48YC 50 10/26/2023       Weight bearing exercise?   Very active - took a hard fall playing [employer] the other day and didn't break anything.  Sense of balance? good  Recent falls? 2 falls in the past year - pickle ball accident and trip on sidewalk - no fractures.  Patient is using the following assist devices for ambulation: none  Height loss (>2 inches)? no    Fracture history:  Only one metatarsal fracture 40 years ago.    Tobacco use?  no  EtOH use?   5 oz wine/night     Current diarrhea or h/o malabsorption? no  GERD or stomach ulcers? no  Thyroid disease? no  Anemia? no  Kidney Stones? No      High risk medications include:  none    Post-menopausal? Age?: early 40s  ERT after menopause?: Was on estrogen for a short while.     Mom or dad with hip/spine fracture or diagnosed with osteoporosis?: No     Malignancy involving bone (active or history)? no  Prior radiation treatment? no  Unexplained elevation of alkaline phosphatase? no    Dental work  planned? no    History of Letrozole x 7 years for breast cancer.     Lab Results   Component Value Date    OXTKNDWZ77UE 50 10/26/2023    JLROIKIW68QK 50 11/09/2022    SBHTHWXG68VQ 51 07/18/2018    CALCIUM 9.5 11/03/2023    CALCIUM 9.7 10/26/2023    CALCIUM 9.4 11/09/2022    PHOS 3.3 08/06/2014    ALKPHOS 64 10/26/2023    ALKPHOS 63 11/09/2022    ALKPHOS 69 12/21/2021    TSH 1.248 10/26/2023         Lab Results   Component Value Date    CTELOPEPTIDE 178 01/31/2024           DXA (Year)  Location 11/21/2019  ([facility]) 11/22/2021  ([facility]) 11/27/2023  [facility] Percent change from previous   L-spine T-score -3.0 -3.0 -2.8 ([facility] since 2019)   Total Hip T-score -2.6 -2.7 -2.6 ([facility] since 2019)   Femoral Neck T-score -3.1 -2.8 -2.8    FRAX (MOF  /  Hip) 14.8%  /  5.0% 22.3%  /  7.5% 15%  /  5.5%          Objective:     Vitals:    01/31/24 1103   BP: 120/80         BP Readings from Last 5 Encounters:   01/31/24 120/80   10/30/23 106/66   10/26/23 104/74   05/17/23 (!) 105/56   03/09/23 110/74         Physical Exam  Vitals and nursing note reviewed.   Constitutional:       General: She is not in acute distress.     Appearance: She is well-developed. She is not ill-appearing.   HENT:      Head: Normocephalic and atraumatic.   Eyes:      General:         Right eye: No discharge.         Left eye: No discharge.      Conjunctiva/sclera: Conjunctivae normal.   Neck:      Thyroid: No thyromegaly.      Trachea: No tracheal deviation.   Pulmonary:      Effort: Pulmonary effort is normal. No respiratory distress.   Musculoskeletal:      Comments: Thin build.   Neurological:      Mental Status: She is alert and oriented to person, place, and time.      Coordination: Coordination normal.      Comments: Gait is normal - balance appears good   Psychiatric:         Mood and Affect: Mood normal.         Behavior: Behavior normal.           Wt Readings from Last 3 Encounters:   01/31/24 1103 49.6 kg (109 lb 7.3 oz)   11/14/23 1135 48.1 kg  (106 lb)   10/30/23 1343 48.3 kg (106 lb 7.7 oz)       Assessment/Plan:     Postmenopausal osteoporosis  Risk factors include low BMI,  race, menopause, previous aromatase inhibitor use.    The recent DXA inadvertently analyzed T12 through L3.  I had then redo the analysis in it turns out her lumbar spine has actually not declined.  Given that she has good balance, has never fractured and T-scores have been stable after six doses of Reclast, I think it is a reasonable decision to continue the drug holiday.  We did discuss other options for treatment such as Evenity or Prolia.  However, after discussion we decided on continuing with the drug holiday.  Will check CTX level and follow that yearly.  If we see an up tick in CTX OR if she develops any new risk factors OR if we see a decline in T-scores over time then we will reinstitute therapy at that point.      RDA of calcium and Vitamin D discussed.  She seems to be getting an appropriate amount of dietary calcium along with 600 mg from her supplement.  Vitamin-D level in October looks great.    Fall precautions emphasized  Weight bearing exercises encouraged    Return to clinic in one year for reassessment.    Breast cancer of upper-outer quadrant of left female breast  Prior aromatase inhibitor use is a risk factor for osteoporosis.      Follow up in about 1 year (around 1/31/2025).

## 2024-02-01 ENCOUNTER — PATIENT MESSAGE (OUTPATIENT)
Dept: ENDOCRINOLOGY | Facility: CLINIC | Age: 74
End: 2024-02-01
Payer: MEDICARE

## 2024-02-01 LAB — COLLAGEN CTX SERPL-MCNC: 178 PG/ML

## 2024-02-01 RX ORDER — EZETIMIBE 10 MG/1
10 TABLET ORAL
Qty: 90 TABLET | Refills: 2 | Status: SHIPPED | OUTPATIENT
Start: 2024-02-01

## 2024-02-01 NOTE — ASSESSMENT & PLAN NOTE
Risk factors include low BMI,  race, menopause, previous aromatase inhibitor use.    The recent DXA inadvertently analyzed T12 through L3.  I had then redo the analysis in it turns out her lumbar spine has actually not declined.  Given that she has good balance, has never fractured and T-scores have been stable after six doses of Reclast, I think it is a reasonable decision to continue the drug holiday.  We did discuss other options for treatment such as Evenity or Prolia.  However, after discussion we decided on continuing with the drug holiday.  Will check CTX level and follow that yearly.  If we see an up tick in CTX OR if she develops any new risk factors OR if we see a decline in T-scores over time then we will reinstitute therapy at that point.      RDA of calcium and Vitamin D discussed.  She seems to be getting an appropriate amount of dietary calcium along with 600 mg from her supplement.  Vitamin-D level in October looks great.    Fall precautions emphasized  Weight bearing exercises encouraged    Return to clinic in one year for reassessment.

## 2024-02-01 NOTE — TELEPHONE ENCOUNTER
No care due was identified.  Health [facility] Embedded Care Due Messages. Reference number: [id number].   2/01/2024 5:02:52 PM CST

## 2024-02-02 NOTE — TELEPHONE ENCOUNTER
Refill Decision Note   Saritha Thomas  is requesting a refill authorization.  Brief Assessment and Rationale for Refill:  Approve     Medication Therapy Plan:         Comments:     Note composed:8:45 PM 02/01/2024

## 2024-02-07 ENCOUNTER — PATIENT MESSAGE (OUTPATIENT)
Dept: INTERNAL MEDICINE | Facility: CLINIC | Age: 74
End: 2024-02-07
Payer: MEDICARE

## 2024-02-08 ENCOUNTER — PATIENT MESSAGE (OUTPATIENT)
Dept: INTERNAL MEDICINE | Facility: CLINIC | Age: 74
End: 2024-02-08
Payer: MEDICARE

## 2024-02-08 RX ORDER — AZITHROMYCIN 250 MG/1
TABLET, FILM COATED ORAL
Qty: 6 TABLET | Refills: 0 | Status: SHIPPED | OUTPATIENT
Start: 2024-02-08 | End: 2024-02-13

## 2024-02-08 RX ORDER — GENTAMICIN SULFATE 3 MG/ML
1 SOLUTION/ DROPS OPHTHALMIC 3 TIMES DAILY
Qty: 5 ML | Refills: 0 | Status: SHIPPED | OUTPATIENT
Start: 2024-02-08 | End: 2024-03-24 | Stop reason: SDUPTHER

## 2024-03-24 RX ORDER — GENTAMICIN SULFATE 3 MG/ML
1 SOLUTION/ DROPS OPHTHALMIC 3 TIMES DAILY
Qty: 5 ML | Refills: 0 | Status: SHIPPED | OUTPATIENT
Start: 2024-03-24

## 2024-04-18 ENCOUNTER — OFFICE VISIT (OUTPATIENT)
Dept: INTERNAL MEDICINE | Facility: CLINIC | Age: 74
End: 2024-04-18
Payer: MEDICARE

## 2024-04-18 VITALS
DIASTOLIC BLOOD PRESSURE: 64 MMHG | SYSTOLIC BLOOD PRESSURE: 106 MMHG | BODY MASS INDEX: 18.59 KG/M2 | HEIGHT: 63 IN | HEART RATE: 78 BPM | WEIGHT: 104.94 LBS | OXYGEN SATURATION: 93 %

## 2024-04-18 DIAGNOSIS — I25.10 ARTERIOSCLEROTIC CORONARY ARTERY DISEASE: ICD-10-CM

## 2024-04-18 DIAGNOSIS — N95.2 POSTMENOPAUSE ATROPHIC VAGINITIS: ICD-10-CM

## 2024-04-18 DIAGNOSIS — E78.00 PURE HYPERCHOLESTEROLEMIA: ICD-10-CM

## 2024-04-18 DIAGNOSIS — E04.1 THYROID NODULE: ICD-10-CM

## 2024-04-18 DIAGNOSIS — D05.02 LOBULAR CARCINOMA IN SITU (LCIS) OF LEFT BREAST: ICD-10-CM

## 2024-04-18 DIAGNOSIS — Z00.00 ENCOUNTER FOR MEDICARE ANNUAL WELLNESS EXAM: Primary | ICD-10-CM

## 2024-04-18 DIAGNOSIS — Z00.00 ENCOUNTER FOR PREVENTIVE HEALTH EXAMINATION: ICD-10-CM

## 2024-04-18 DIAGNOSIS — K59.00 CONSTIPATION, UNSPECIFIED CONSTIPATION TYPE: ICD-10-CM

## 2024-04-18 DIAGNOSIS — Z11.59 NEED FOR HEPATITIS C SCREENING TEST: ICD-10-CM

## 2024-04-18 DIAGNOSIS — C50.412 MALIGNANT NEOPLASM OF UPPER-OUTER QUADRANT OF LEFT FEMALE BREAST, UNSPECIFIED ESTROGEN RECEPTOR STATUS: ICD-10-CM

## 2024-04-18 DIAGNOSIS — M81.0 POSTMENOPAUSAL OSTEOPOROSIS: ICD-10-CM

## 2024-04-18 PROCEDURE — 99999 PR PBB SHADOW E&M-EST. PATIENT-LVL V: CPT | Mod: PBBFAC,,, | Performed by: NURSE PRACTITIONER

## 2024-04-18 PROCEDURE — G0439 PPPS, SUBSEQ VISIT: HCPCS | Mod: ,,, | Performed by: NURSE PRACTITIONER

## 2024-04-18 PROCEDURE — 99215 OFFICE O/P EST HI 40 MIN: CPT | Mod: PBBFAC | Performed by: NURSE PRACTITIONER

## 2024-04-18 NOTE — PATIENT INSTRUCTIONS
Counseling and Referral of Other Preventative  (Italic type indicates deductible and co-insurance are waived)    Patient Name: Saritha Guzman  Today's Date: 4/18/2024    Health Maintenance       Date Due Completion Date    Hepatitis C Screening Never done ---    Pneumococcal Vaccines (Age 65+) (4 of 4 - PPSV23 or PCV20) 07/31/2018 7/31/2017    COVID-19 Vaccine (7 - 2023-24 season) 01/13/2024 11/18/2023    Mammogram 08/03/2024 8/3/2023    Hemoglobin A1c (Prediabetes) 10/26/2024 10/26/2023    High Dose Statin 01/31/2025 1/31/2024    DEXA Scan 11/27/2026 11/27/2023    Lipid Panel 10/26/2028 10/26/2023    TETANUS VACCINE 05/05/2032 5/5/2022        No orders of the defined types were placed in this encounter.    The following information is provided to all patients.  This information is to help you find resources for any of the problems found today that may be affecting your health:                  Living healthy guide: www.[facility].[place].gov      Understanding Diabetes: www.diabetes.org      Eating healthy: www.cdc.gov/healthyweight      CDC home safety checklist: www.cdc.gov/steadi/patient.html      Agency on Aging: www.goea.[place].gov      Alcoholics anonymous (AA): www.aa.org      Physical Activity: www.randi.nih.gov/xe2boow      Tobacco use: www.quitwithusla.org         
The patient is a 76y Male complaining of shortness of breath.

## 2024-04-18 NOTE — PROGRESS NOTES
"  Saritha Guzman presented for a  Medicare AWV and comprehensive Health Risk Assessment today. The following components were reviewed and updated:    Medical history  Family History  Social history  Allergies and Current Medications  Health Risk Assessment  Health Maintenance  Care Team         ** See Completed Assessments for Annual Wellness Visit within the encounter summary.**         The following assessments were completed:  Living Situation  CAGE  Depression Screening  Timed Get Up and Go  Whisper Test  Cognitive Function Screening  Nutrition Screening  ADL Screening  PAQ Screening      Opioid documentation:      Patient does not have a current opioid prescription.        Vitals:    04/18/24 0853   BP: 106/64   BP Location: Left arm   Patient Position: Sitting   BP Method: Medium (Manual)   Pulse: 78   SpO2: (!) 93%   Weight: 47.6 kg (104 lb 15 oz)   Height: 5' 3" (1.6 m)     Body mass index is 18.59 kg/m².  Physical Exam  Constitutional:       Appearance: Normal appearance.   Pulmonary:      Effort: Pulmonary effort is normal.   Neurological:      General: No focal deficit present.      Mental Status: She is alert and oriented to person, place, and time.   Psychiatric:         Mood and Affect: Mood normal.         Behavior: Behavior normal.                 Diagnoses and health risks identified today and associated recommendations/orders:    1. Encounter for Medicare annual wellness exam  Patient was seen today for AWV.  Healthcare maintenance and screening recommendations were discussed and updated as indicated.  Return in one year for AWV.  - Ambulatory Referral/Consult to Enhanced Annual Wellness Visit (eAWV)    2. Encounter for preventive health examination  Patient was seen today for AWV.  Healthcare maintenance and screening recommendations were discussed and updated as indicated.  Return in one year for AWV.    3. Arteriosclerotic coronary artery disease  The current medical regimen is effective;  " continue present plan and medications.    4. Lobular carcinoma in situ (LCIS) of left breast  The current medical regimen is effective;  continue present plan and medications.    5. Malignant neoplasm of upper-outer quadrant of left female breast, unspecified estrogen receptor status  The current medical regimen is effective;  continue present plan and medications.    6. Pure hypercholesterolemia  The current medical regimen is effective;  continue present plan and medications.    7. Postmenopause atrophic vaginitis  The current medical regimen is effective;  continue present plan and medications.    8. Thyroid nodule  The current medical regimen is effective;  continue present plan and medications.    9. Postmenopausal osteoporosis  The current medical regimen is effective;  continue present plan and medications.    10. Constipation, unspecified constipation type  The current medical regimen is effective;  continue present plan and medications.    11. Need for hepatitis C screening test  Routine screening.  - HEPATITIS C ANTIBODY; Future      Provided Saritha with a 5-10 year written screening schedule and personal prevention plan. Recommendations were developed using the USPSTF age appropriate recommendations. Education, counseling, and referrals were provided as needed. After Visit Summary printed and given to patient which includes a list of additional screenings\tests needed.    Courtney Marvin NP    I offered to discuss advanced care planning, including how to pick a person who would make decisions for you if you were unable to make them for yourself, called a health care power of , and what kind of decisions you might make such as use of life sustaining treatments such as ventilators and tube feeding when faced with a life limiting illness recorded on a living will that they will need to know. (How you want to be cared for as you near the end of your natural life)     X Patient is interested in learning  more about how to make advanced directives.  I provided them paperwork and offered to discuss this with them.

## 2024-04-30 DIAGNOSIS — N95.2 VAGINAL ATROPHY: ICD-10-CM

## 2024-05-01 RX ORDER — PRASTERONE 6.5 MG/1
INSERT VAGINAL
Qty: 30 EACH | Refills: 11 | Status: SHIPPED | OUTPATIENT
Start: 2024-05-01

## 2024-05-01 NOTE — TELEPHONE ENCOUNTER
Refill Routing Note   Medication(s) are not appropriate for processing by [facility] for the following reason(s):        Outside of protocol    [facility] action(s):  Route               Appointments  past 12m or future 3m with PCP    Date Provider   Last Visit   3/9/2023 Emi Cordero MD   Next Visit   Visit date not found Emi Cordero MD   ED visits in past 90 days: 0        Note composed:7:15 AM 05/01/2024

## 2024-06-19 NOTE — PROGRESS NOTES
Subjective:       Patient ID: Saritha Guzman is a 74 y.o. female.    Chief Complaint: 1 year and Malignant neoplasm of upper-outer quadrant of left breast i    HPI Ms Guzman is a 73y/o WF Is seen for F/U of left breast cancer. She had stage I ER positive disease with a high Oncotype score.   Overall she is doing well. She has completed 7 years on letrozole.    She is playing a lot of pickle ball and walking several miles a day. She does have some sore knees, arthralgias.     Still with some sleep disturbances, does not wish to take medications.   Follows with Dr. Jiang.    She continues on atorvastatin for her hyperlipidemia with good control.  Currently on vitamin D and calcium from osteoporosis, completed 6 years of zometa - followed by Dr. Yadav     Per Dr. Salazar's previous note: History: mammogram in June 2013 showed pleomorphic calcifications left breast at the 2:00 position. By ultrasound there was a 2.4 cm asymmetric density.     A core needle biopsy showed LCIS and an MRI of the breast showed an irregular mass at the 2:00 position measured 3.9 x 2.1 cm with no adenopathy. 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 On July 12 Lumpectomy showed multiple foci of infiltrating lobular carcinoma, With the largest measuring 1.1 cm. histologic grade 3 nuclear grade 2 mitotic index 2.   The tumor was 50% ER positive-which would've moderate, OR negative and HER-2 negative.     Subsequently she underwent a left mastectomy with sentinel lymph node biopsy which showed only some residual LCIS. The sentinel lymph node was negative. Final pathological stage TI CN0 stage IA. Oncotype score was high.    She had 4 cycles of adjuvant Taxotere and Cytoxan completed January 2014 and then began adjuvant letrozole therapy.       Review of Systems   Constitutional:  Positive for fatigue. Negative for activity change, appetite change, chills, diaphoresis,  fever and unexpected weight change.   HENT:  Negative for mouth sores, nosebleeds, sore throat and trouble swallowing.    Eyes:  Negative for visual disturbance.   Respiratory:  Negative for cough and shortness of breath.    Cardiovascular:  Negative for chest pain, palpitations and leg swelling.   Gastrointestinal:  Negative for abdominal distention, abdominal pain, blood in stool, constipation, diarrhea, nausea and vomiting.   Genitourinary:  Negative for frequency, hematuria and vaginal bleeding.   Musculoskeletal:  Negative for arthralgias, back pain and myalgias.   Skin:  Negative for pallor and rash.   Allergic/Immunologic: Negative for immunocompromised state.   Neurological:  Negative for dizziness, weakness, light-headedness, numbness and headaches.   Hematological:  Negative for adenopathy. Does not bruise/bleed easily.   Psychiatric/Behavioral:  Positive for sleep disturbance. Negative for confusion. The patient is not nervous/anxious.        Objective:      Physical Exam  Vitals and nursing note reviewed.   Constitutional:       General: She is not in acute distress.     Appearance: Normal appearance. She is well-developed.   HENT:      Head: Normocephalic.   Eyes:      General: No scleral icterus.     Pupils: Pupils are equal, round, and reactive to light.   Cardiovascular:      Rate and Rhythm: Normal rate and regular rhythm.      Heart sounds: Normal heart sounds.   Pulmonary:      Effort: Pulmonary effort is normal.      Breath sounds: Normal breath sounds. No wheezing or rales.   Chest:   Breasts:     Right: No mass, nipple discharge, skin change or tenderness.      Left: No mass, nipple discharge, skin change or tenderness.      Comments: Left breast mastectomy with reconstruction   Abdominal:      General: Bowel sounds are normal. There is no distension.      Palpations: Abdomen is soft. There is no mass.      Tenderness: There is no abdominal tenderness.   Musculoskeletal:      Cervical back:  Normal range of motion.   Lymphadenopathy:      Cervical: No cervical adenopathy.      Upper Body:      Right upper body: No supraclavicular or axillary adenopathy.      Left upper body: No supraclavicular or axillary adenopathy.   Skin:     General: Skin is warm and dry.      Findings: No rash.   Neurological:      Mental Status: She is alert and oriented to person, place, and time.   Psychiatric:         Behavior: Behavior normal.         Thought Content: Thought content normal.         Assessment:       1. Malignant neoplasm of upper-outer quadrant of left female breast, unspecified estrogen receptor status        Plan:       1-2. She completed 7 years of therapy last year and is off letrozole.    Mammogram due in August, MRI no longer indicated due to ask  3. Continue current medication and follow up with PCP  4. DXA in Nov 2023; osteoporosis; Followed by Dr. Yadav   US of liver to follow up on cysts - negative no need for further imaging    Return to clinic in 1 year with [facility] appointment and imaging.     Patient is in agreement with the proposed treatment plan. All questions were answered to the patient's satisfaction. Patient knows to call clinic for any new or worsening symptoms and if anything is needed before the next clinic visit.          Kathleen Ramirez, SRIKANTHP-C  Hematology & Medical Oncology   [facility] [place] [address]  ph. [phone]  Fax. [phone]    Collaborating physician, Dr. Salazar.    Route Chart for Scheduling    Med Onc Chart Routing      Follow up with physician    Follow up with [facility] 1 year.   Infusion scheduling note    Injection scheduling note    Labs    Imaging Mammogram   right breast mammo Aug 7 after 10:30   Pharmacy appointment    Other referrals

## 2024-07-01 ENCOUNTER — OFFICE VISIT (OUTPATIENT)
Dept: HEMATOLOGY/ONCOLOGY | Facility: CLINIC | Age: 74
End: 2024-07-01
Payer: MEDICARE

## 2024-07-01 VITALS
WEIGHT: 105.38 LBS | BODY MASS INDEX: 17.99 KG/M2 | DIASTOLIC BLOOD PRESSURE: 70 MMHG | SYSTOLIC BLOOD PRESSURE: 120 MMHG | HEIGHT: 64 IN | HEART RATE: 80 BPM | TEMPERATURE: 98 F | OXYGEN SATURATION: 99 %

## 2024-07-01 DIAGNOSIS — Z85.3 HISTORY OF BREAST CANCER: Primary | ICD-10-CM

## 2024-07-01 DIAGNOSIS — Z12.31 ENCOUNTER FOR SCREENING MAMMOGRAM FOR MALIGNANT NEOPLASM OF BREAST: ICD-10-CM

## 2024-07-01 PROCEDURE — 99214 OFFICE O/P EST MOD 30 MIN: CPT | Mod: S$PBB,,, | Performed by: NURSE PRACTITIONER

## 2024-07-01 PROCEDURE — 99214 OFFICE O/P EST MOD 30 MIN: CPT | Mod: PBBFAC | Performed by: NURSE PRACTITIONER

## 2024-07-01 PROCEDURE — G2211 COMPLEX E/M VISIT ADD ON: HCPCS | Mod: S$PBB,,, | Performed by: NURSE PRACTITIONER

## 2024-07-01 PROCEDURE — 99999 PR PBB SHADOW E&M-EST. PATIENT-LVL IV: CPT | Mod: PBBFAC,,, | Performed by: NURSE PRACTITIONER

## 2024-08-07 ENCOUNTER — HOSPITAL ENCOUNTER (OUTPATIENT)
Dept: RADIOLOGY | Facility: HOSPITAL | Age: 74
Discharge: HOME | End: 2024-08-07
Attending: NURSE PRACTITIONER
Payer: MEDICARE

## 2024-08-07 DIAGNOSIS — Z12.31 ENCOUNTER FOR SCREENING MAMMOGRAM FOR MALIGNANT NEOPLASM OF BREAST: ICD-10-CM

## 2024-08-07 DIAGNOSIS — Z85.3 HISTORY OF BREAST CANCER: ICD-10-CM

## 2024-08-07 PROCEDURE — 77063 BREAST TOMOSYNTHESIS BI: CPT | Mod: 26,52,, | Performed by: RADIOLOGY

## 2024-08-07 PROCEDURE — 77063 BREAST TOMOSYNTHESIS BI: CPT | Mod: TC,52

## 2024-08-07 PROCEDURE — 77067 SCR MAMMO BI INCL CAD: CPT | Mod: TC,52

## 2024-08-07 PROCEDURE — 77067 SCR MAMMO BI INCL CAD: CPT | Mod: 26,52,, | Performed by: RADIOLOGY

## 2024-10-28 DIAGNOSIS — F41.9 ANXIETY: ICD-10-CM

## 2024-10-29 RX ORDER — LORAZEPAM 1 MG/1
TABLET ORAL
Qty: 30 TABLET | Refills: 2 | Status: SHIPPED | OUTPATIENT
Start: 2024-10-29

## 2024-10-29 RX ORDER — EZETIMIBE 10 MG/1
10 TABLET ORAL
Qty: 90 TABLET | Refills: 2 | Status: SHIPPED | OUTPATIENT
Start: 2024-10-29

## 2024-11-05 ENCOUNTER — CLINICAL SUPPORT (OUTPATIENT)
Dept: INTERNAL MEDICINE | Facility: CLINIC | Age: 74
End: 2024-11-05
Payer: MEDICARE

## 2024-11-05 ENCOUNTER — OFFICE VISIT (OUTPATIENT)
Dept: INTERNAL MEDICINE | Facility: CLINIC | Age: 74
End: 2024-11-05
Payer: MEDICARE

## 2024-11-05 VITALS
BODY MASS INDEX: 18.1 KG/M2 | WEIGHT: 106 LBS | DIASTOLIC BLOOD PRESSURE: 60 MMHG | OXYGEN SATURATION: 99 % | SYSTOLIC BLOOD PRESSURE: 114 MMHG | HEIGHT: 64 IN | HEART RATE: 82 BPM

## 2024-11-05 DIAGNOSIS — Z85.3 HISTORY OF BREAST CANCER: ICD-10-CM

## 2024-11-05 DIAGNOSIS — G47.00 INSOMNIA, UNSPECIFIED TYPE: ICD-10-CM

## 2024-11-05 DIAGNOSIS — R79.9 ABNORMAL FINDING OF BLOOD CHEMISTRY, UNSPECIFIED: ICD-10-CM

## 2024-11-05 DIAGNOSIS — R06.09 DOE (DYSPNEA ON EXERTION): ICD-10-CM

## 2024-11-05 DIAGNOSIS — R35.0 URINE FREQUENCY: ICD-10-CM

## 2024-11-05 DIAGNOSIS — R73.03 PREDIABETES: ICD-10-CM

## 2024-11-05 DIAGNOSIS — Z23 NEED FOR 23-POLYVALENT PNEUMOCOCCAL POLYSACCHARIDE VACCINE: ICD-10-CM

## 2024-11-05 DIAGNOSIS — Z00.00 ANNUAL PHYSICAL EXAM: Primary | ICD-10-CM

## 2024-11-05 DIAGNOSIS — M81.0 POSTMENOPAUSAL OSTEOPOROSIS: Primary | ICD-10-CM

## 2024-11-05 DIAGNOSIS — E55.9 VITAMIN D DEFICIENCY, UNSPECIFIED: ICD-10-CM

## 2024-11-05 LAB
25(OH)D3+25(OH)D2 SERPL-MCNC: 51 NG/ML (ref 30–96)
ALBUMIN SERPL BCP-MCNC: 4.1 G/DL (ref 3.5–5.2)
ALP SERPL-CCNC: 61 U/L (ref 40–150)
ALT SERPL W/O P-5'-P-CCNC: 37 U/L (ref 10–44)
ANION GAP SERPL CALC-SCNC: 7 MMOL/L (ref 8–16)
AST SERPL-CCNC: 24 U/L (ref 10–40)
BACTERIA #/AREA URNS AUTO: NORMAL /HPF
BASOPHILS # BLD AUTO: 0.06 K/UL (ref 0–0.2)
BASOPHILS NFR BLD: 1.2 % (ref 0–1.9)
BILIRUB SERPL-MCNC: 0.9 MG/DL (ref 0.1–1)
BUN SERPL-MCNC: 17 MG/DL (ref 8–23)
CALCIUM SERPL-MCNC: 9.9 MG/DL (ref 8.7–10.5)
CHLORIDE SERPL-SCNC: 109 MMOL/L (ref 95–110)
CHOLEST SERPL-MCNC: 137 MG/DL (ref 120–199)
CHOLEST/HDLC SERPL: 2.4 {RATIO} (ref 2–5)
CO2 SERPL-SCNC: 26 MMOL/L (ref 23–29)
CREAT SERPL-MCNC: 0.9 MG/DL (ref 0.5–1.4)
DIFFERENTIAL METHOD BLD: ABNORMAL
EOSINOPHIL # BLD AUTO: 0.1 K/UL (ref 0–0.5)
EOSINOPHIL NFR BLD: 1.9 % (ref 0–8)
ERYTHROCYTE [DISTWIDTH] IN BLOOD BY AUTOMATED COUNT: 12.1 % (ref 11.5–14.5)
EST. GFR  (NO RACE VARIABLE): >60 ML/MIN/1.73 M^2
ESTIMATED AVG GLUCOSE: 108 MG/DL (ref 68–131)
GLUCOSE SERPL-MCNC: 95 MG/DL (ref 70–110)
HBA1C MFR BLD: 5.4 % (ref 4–5.6)
HCT VFR BLD AUTO: 40 % (ref 37–48.5)
HDLC SERPL-MCNC: 57 MG/DL (ref 40–75)
HDLC SERPL: 41.6 % (ref 20–50)
HGB BLD-MCNC: 13.3 G/DL (ref 12–16)
IMM GRANULOCYTES # BLD AUTO: 0.02 K/UL (ref 0–0.04)
IMM GRANULOCYTES NFR BLD AUTO: 0.4 % (ref 0–0.5)
LDLC SERPL CALC-MCNC: 64.8 MG/DL (ref 63–159)
LYMPHOCYTES # BLD AUTO: 2.1 K/UL (ref 1–4.8)
LYMPHOCYTES NFR BLD: 40.1 % (ref 18–48)
MCH RBC QN AUTO: 32.6 PG (ref 27–31)
MCHC RBC AUTO-ENTMCNC: 33.3 G/DL (ref 32–36)
MCV RBC AUTO: 98 FL (ref 82–98)
MICROSCOPIC COMMENT: NORMAL
MONOCYTES # BLD AUTO: 0.5 K/UL (ref 0.3–1)
MONOCYTES NFR BLD: 8.7 % (ref 4–15)
NEUTROPHILS # BLD AUTO: 2.5 K/UL (ref 1.8–7.7)
NEUTROPHILS NFR BLD: 47.7 % (ref 38–73)
NONHDLC SERPL-MCNC: 80 MG/DL
NRBC BLD-RTO: 0 /100 WBC
PLATELET # BLD AUTO: 236 K/UL (ref 150–450)
PMV BLD AUTO: 10.3 FL (ref 9.2–12.9)
POTASSIUM SERPL-SCNC: 4.2 MMOL/L (ref 3.5–5.1)
PROT SERPL-MCNC: 6.8 G/DL (ref 6–8.4)
RBC # BLD AUTO: 4.08 M/UL (ref 4–5.4)
RBC #/AREA URNS AUTO: 1 /HPF (ref 0–4)
SODIUM SERPL-SCNC: 142 MMOL/L (ref 136–145)
TRIGL SERPL-MCNC: 76 MG/DL (ref 30–150)
TSH SERPL DL<=0.005 MIU/L-ACNC: 1.74 UIU/ML (ref 0.4–4)
WBC # BLD AUTO: 5.16 K/UL (ref 3.9–12.7)
WBC #/AREA URNS AUTO: 2 /HPF (ref 0–5)

## 2024-11-05 PROCEDURE — 84443 ASSAY THYROID STIM HORMONE: CPT | Performed by: INTERNAL MEDICINE

## 2024-11-05 PROCEDURE — 99999 PR PBB SHADOW E&M-EST. PATIENT-LVL III: CPT | Mod: PBBFAC,,, | Performed by: INTERNAL MEDICINE

## 2024-11-05 PROCEDURE — G0009 ADMIN PNEUMOCOCCAL VACCINE: HCPCS | Mod: PBBFAC

## 2024-11-05 PROCEDURE — 80061 LIPID PANEL: CPT | Performed by: INTERNAL MEDICINE

## 2024-11-05 PROCEDURE — 80053 COMPREHEN METABOLIC PANEL: CPT | Performed by: INTERNAL MEDICINE

## 2024-11-05 PROCEDURE — 99999PBSHW PR PBB SHADOW TECHNICAL ONLY FILED TO HB: Mod: PBBFAC,,,

## 2024-11-05 PROCEDURE — 99213 OFFICE O/P EST LOW 20 MIN: CPT | Mod: PBBFAC | Performed by: INTERNAL MEDICINE

## 2024-11-05 PROCEDURE — 81001 URINALYSIS AUTO W/SCOPE: CPT | Performed by: INTERNAL MEDICINE

## 2024-11-05 PROCEDURE — 83036 HEMOGLOBIN GLYCOSYLATED A1C: CPT | Performed by: INTERNAL MEDICINE

## 2024-11-05 PROCEDURE — 99214 OFFICE O/P EST MOD 30 MIN: CPT | Mod: S$PBB,,, | Performed by: INTERNAL MEDICINE

## 2024-11-05 PROCEDURE — 85025 COMPLETE CBC W/AUTO DIFF WBC: CPT | Performed by: INTERNAL MEDICINE

## 2024-11-05 PROCEDURE — 82306 VITAMIN D 25 HYDROXY: CPT | Performed by: INTERNAL MEDICINE

## 2024-11-05 PROCEDURE — 90677 PCV20 VACCINE IM: CPT | Mod: PBBFAC

## 2024-11-05 RX ORDER — ATORVASTATIN CALCIUM 20 MG/1
20 TABLET, FILM COATED ORAL DAILY
COMMUNITY

## 2024-11-05 RX ADMIN — PNEUMOCOCCAL 20-VALENT CONJUGATE VACCINE 0.5 ML
2.2; 2.2; 2.2; 2.2; 2.2; 2.2; 2.2; 2.2; 2.2; 2.2; 2.2; 2.2; 2.2; 2.2; 2.2; 2.2; 4.4; 2.2; 2.2; 2.2 INJECTION, SUSPENSION INTRAMUSCULAR at 11:11

## 2024-11-06 NOTE — PROGRESS NOTES
"Subjective:       Patient ID: Saritha Guzman is a 74 y.o. female who presents today for:    Chief Complaint:   Chief Complaint   Patient presents with    Annual Exam     History of Present Illness    CHIEF COMPLAINT:  Saritha presents today for annual follow-up.    CURRENT SYMPTOMS:  She reports joint discomfort, describing it as "creaky joints." She also notes intermittent shortness of breath, particularly when engaging in physical activities such as running upstairs. She complains of ear discomfort, describing a sensation of water in her ears without relief from attempting to clear them by manipulation. She denies specific ear pain.    SLEEP ISSUES:  She reports difficulty staying asleep, frequently waking up throughout the night. Belsomra caused paradoxical effects, making her feel wired instead of sleepy. Despite lifestyle modifications such as regular exercise, limiting sugar intake, and minimal alcohol consumption, sleep issues persist. Magnesium supplements caused severe diarrhea when tried previously. She occasionally experiences good sleep, but reports this is rare. She is open to trying new sleep medications and staying in bed longer to improve sleep duration.    URINARY SYMPTOMS:  She reports increased urinary frequency, acknowledging increased water intake as a contributing factor. She also experiences mild urinary incontinence. Previous treatment with medication for urinary frequency resulted in side effects of dry mouth and constipation without effectively addressing the issue.    EXERCISE ROUTINE:  She walks approximately two miles 4-5 days per week and plays pickleball with her  2-3 times weekly. During summer months, she swims laps 4 times per week, completing about 40 laps in a 40-foot pool. She also participates in water yoga once a week, aiming to engage in some form of exercise daily.    MEDICATIONS AND SUPPLEMENTS:  She takes atorvastatin 20 mg and Zetia for cholesterol management, " calcium with Vitamin D daily, additional Vitamin D3, CoQ10, and B12 supplements. She uses lorazepam as needed for anxiety during air travel or occasionally for sleep, taking half a tablet when necessary.    MEDICAL HISTORY:  She has a history of breast cancer. She previously received Reclast treatment for approximately seven years, which was extended beyond the typical five-year course due to concurrent chemotherapy. She discontinued Reclast treatment at least two years ago without side effects.    RECENT HEALTH SCREENINGS:  Recent mammogram results were reported as good. A skin check performed by a dermopathologist at an external clinic revealed no concerning findings. A thyroid ultrasound in 2022 indicated that the nodules do not require follow-up.      ROS:  General: -fever, -chills, -fatigue, -weight gain, -weight loss  Eyes: -vision changes, -redness, -discharge  ENT: -ear pain, -nasal congestion, -sore throat, +dry mouth  Cardiovascular: -chest pain, -palpitations, -lower extremity edema  Respiratory: -cough, +shortness of breath  Gastrointestinal: -abdominal pain, -nausea, -vomiting, -diarrhea, +constipation, -blood in stool  Genitourinary: -dysuria, -hematuria, +frequency, +urinary incontinence  Musculoskeletal: +joint pain, -muscle pain  Skin: -rash, -lesion  Neurological: -headache, -dizziness, -numbness, -tingling  Psychiatric: -anxiety, -depression, +sleep difficulty         Medications:  Outpatient Encounter Medications as of 11/5/2024   Medication Sig Dispense Refill    calcium-vitamin D3 (OS-CHIN 500 + D3) 500 mg-5 mcg (200 unit) per tablet Take 1 tablet by mouth 2 (two) times daily with meals.      cholecalciferol, vitamin D3, (VITAMIN D3) 25 mcg (1,000 unit) capsule Take 2,000 Units by mouth once daily.      coenzyme Q10 200 mg capsule Take 200 mg by mouth once daily.      cyanocobalamin (VITAMIN B-12) 1000 MCG tablet Take 1,000 mcg by mouth once daily.      ezetimibe (ZETIA) 10 mg tablet TAKE 1  TABLET(10 MG) BY MOUTH EVERY DAY 90 tablet 2    LORazepam (ATIVAN) 1 MG tablet TAKE 1 TABLET(1 MG) BY MOUTH EVERY 12 HOURS AS NEEDED 30 tablet 2    prasterone, dhea, (INTRAROSA) 6.5 mg Inst USE ONE INSERT VAGINALLY THREE DAYS A WEEK 30 each 11    atorvastatin (LIPITOR) 20 MG tablet Take 20 mg by mouth once daily.      [DISCONTINUED] ABRYSVO 120 mcg/0.5 mL SolR vaccine Inject into the muscle. (Patient not taking: Reported on 2024)      [DISCONTINUED] ALPRAZolam (XANAX) 0.25 MG tablet Take 1 tablet (0.25 mg total) by mouth daily as needed for Anxiety. (Patient not taking: Reported on 10/26/2023) 15 tablet 0    [DISCONTINUED] atorvastatin (LIPITOR) 20 MG tablet TAKE 1 TABLET(20 MG) BY MOUTH EVERY DAY 90 tablet 3    [DISCONTINUED] biotin 1,000 mcg Chew Take by mouth. (Patient not taking: Reported on 2024)      [DISCONTINUED] ezetimibe (ZETIA) 10 mg tablet TAKE 1 TABLET(10 MG) BY MOUTH EVERY DAY 90 tablet 2    [DISCONTINUED] gentamicin (GARAMYCIN) 0.3 % ophthalmic solution Place 1 drop into both eyes 3 (three) times daily. (Patient not taking: Reported on 2024) 5 mL 0    [DISCONTINUED] LORazepam (ATIVAN) 1 MG tablet Take 1 tablet (1 mg total) by mouth every 12 (twelve) hours as needed. (Patient not taking: Reported on 2024) 30 tablet 2    [] pneumoc 20-ann conj-dip cr(PF) (PREVNAR-20 (PF)) injection Syrg 0.5 mL        No facility-administered encounter medications on file as of 2024.       Allergies:  Review of patient's allergies indicates:   Allergen Reactions    Tetracyclines Nausea And Vomiting    Codeine     Corticosteroids (glucocorticoids)      Oral version puts in manic state    Penicillins        Health Maintenance:  Immunization History   Administered Date(s) Administered    COVID-19 Vaccine 2023, 2024    COVID-19, MRNA, LN-S, PF (Pfizer) (Gray Cap) 2022    COVID-19, MRNA, LN-S, PF (Pfizer) (Purple Cap) 2021, 2021, 09/15/2021    COVID-19, mRNA,  "LNP-S, PF, gabi-sucrose, 30 mcg/0.3 mL (Pfizer Ages 12+) 11/18/2023    COVID-19, mRNA, LNP-S, bivalent booster, PF (PFIZER OMICRON) 10/19/2022    DTaP 01/26/2012    Hepatitis A 01/26/2012    Hepatitis A, Pediatric/Adolescent, 2 Dose 01/26/2012    Influenza 09/22/2019, 10/09/2023    Influenza (FLUAD) - Quadrivalent - Adjuvanted - PF *Preferred* (65+) 10/08/2021, 09/22/2022    Influenza - Quadrivalent - PF *Preferred* (6 months and older) 08/14/2014, 08/17/2020    Influenza - Trivalent - Afluria, Fluzone MDV 08/14/2014    Influenza - Trivalent - Fluad - Adjuvanted - PF (65 years and older 09/22/2019    Influenza - Trivalent - Fluarix, Flulaval, Fluzone, Afluria - PF 08/17/2020    Influenza - Trivalent - Fluzone High Dose - PF (65 years and older) 09/21/2015, 09/17/2016, 08/24/2017, 08/25/2017, 09/22/2018, 08/17/2020, 10/09/2023    Influenza A (H1N1) 2009 Monovalent - IM 11/23/2009    Influenza Split 08/14/2014, 09/21/2015    Influenza Whole 08/23/2010, 08/22/2013    Pneumococcal Conjugate - 13 Valent 07/31/2017    Pneumococcal Conjugate - 20 Valent 11/05/2024    Pneumococcal Polysaccharide - 23 Valent 01/05/2010, 12/14/2010    RSV IGIV 09/25/2023    Rsv, Bivalent, Rsvpref (Abrysvo) 09/25/2023    Tdap 05/05/2022    Tetanus 05/05/2022    Yellow Fever 07/31/2017    Zoster 10/22/2008    Zoster Recombinant 07/18/2018, 07/21/2018, 07/21/2018, 09/29/2018, 09/29/2018      Health Maintenance   Topic Date Due    Hepatitis C Screening  Never done    High Dose Statin  11/05/2025    DEXA Scan  11/27/2026    Lipid Panel  11/05/2029    TETANUS VACCINE  05/05/2032    Shingles Vaccine  Completed    Aspirin/Antiplatelet Therapy  Discontinued          Objective:      Vital Signs  Pulse: 82  SpO2: 99 %  BP: 114/60  Patient Position: Sitting  Pain Score: 0-No pain  Height and Weight  Height: 5' 4" (162.6 cm)  Weight: 48.1 kg (106 lb)  BSA (Calculated - sq m): 1.47 sq meters  BMI (Calculated): 18.2  Weight in (lb) to have BMI = 25: " 145.3]    Physical Exam   Physical Exam    General: No acute distress. Well-developed. Well-nourished.  Eyes: EOMI. Sclerae anicteric.  HENT: Normocephalic. Atraumatic. Nares patent. Moist oral mucosa.  Ears: Bilateral TMs clear. Bilateral EACs clear. Cerumen present in one ear.  Cardiovascular: Regular rate. Regular rhythm. No murmurs. No rubs. No gallops. Normal S1, S2.  Respiratory: Normal respiratory effort. Clear to auscultation bilaterally. No rales. No rhonchi. No wheezing.  Abdomen: Soft. Non-tender. Non-distended. Normoactive bowel sounds.  Musculoskeletal: No  obvious deformity.  Extremities: No lower extremity edema.  Neurological: Alert & oriented x3. No slurred speech. Normal gait.  Psychiatric: Normal mood. Normal affect. Good insight. Good judgment.  Skin: Warm. Dry. No rash.        Lab Results   Component Value Date    WBC 5.16 11/05/2024    HGB 13.3 11/05/2024    HCT 40.0 11/05/2024     11/05/2024    CHOL 137 11/05/2024    TRIG 76 11/05/2024    HDL 57 11/05/2024    ALT 37 11/05/2024    AST 24 11/05/2024     11/05/2024    K 4.2 11/05/2024     11/05/2024    CREATININE 0.9 11/05/2024    BUN 17 11/05/2024    CO2 26 11/05/2024    TSH 1.737 11/05/2024    INR 0.9 03/24/2004    HGBA1C 5.4 11/05/2024     Assessment/plan:     Saritha Guzman is a 74 y.o.female here for AN ANNUAL PHYSICAL EXAM    Postmenopausal osteoporosis  Comments:  Will get bone mineral density after 1 year.  Off of Reclast 2 years.  Orders:  -     DXA Bone Density Axial Skeleton 1 or more sites; Future    CAMPUZANO (dyspnea on exertion)  -     X-Ray Chest PA And Lateral; Future; Expected date: 11/05/2024    History of breast cancer-left breast- 2013   KEEPS IT WE YEARLY MAMMOGRAMS    Urine frequency- with occassional incontinence  Comments:  Discuss estradiol cream in place of intra Leslee which the latter is not helping with vaginal dryness and also discussed Gemtesa/Myrbetriq    Insomnia, difficulty with sleep maintenance.   Wakes up frequently 4 times nocturia  Comments:  Per above    Need for 23-polyvalent pneumococcal polysaccharide vaccine  -     pneumoc 20-ann conj-dip cr(PF) (PREVNAR-20 (PF)) injection Syrg 0.5 mL      Assessment & Plan    Assessed urinary frequency and nocturia; considering spastic bladder vs. pelvic floor weakness due to prior cancer treatment  Evaluated cholesterol management; patient well-controlled on current regimen  Reviewed bone health status; patient off Reclast for 2 years after extended course  Considered updated pneumococcal vaccination given new Prevnar 20 recommendation  Noted complaint of shortness of breath; no clear etiology on exam  Messaging Dr. Anette Pike (OB/GYN) regarding estradiol vaginal cream recommendation    URINARY SYMPTOMS AND PELVIC FLOOR SUPPORT:  - Discussed potential benefits of estradiol vaginal cream for pelvic floor support and urinary symptoms.  - Consider starting estradiol vaginal cream for pelvic floor support and urinary symptoms (pending OB/GYN approval).  - Consider starting Geetha or Myrbetriq for spastic bladder if appropriate.  - Contact the office if interested in trying new bladder medications (Geetha or Myrbetriq).    PNEUMOCOCCAL VACCINATION:  - Educated on pneumococcal vaccination and its role in preventing bacterial pneumonia.  - Administered Prevnar 20 pneumococcal vaccine in office.    SLEEP ISSUES:  - Saritha to stay in bed longer, even if not fully asleep, to improve sleep quality.  - Recommend considering magnesium glycinate or other natural sleep aids.    EXERCISE REGIMEN:  - Saritha to continue current exercise regimen including walking, pickleball, swimming, and water yoga.    HYPERLIPIDEMIA:  - Continued atorvastatin 20 mg daily.    MEDICATIONS/SUPPLEMENTS:  - Continued current supplements: Vitamin D3, calcium with D, CoQ10, B vitamin.  - Continued lorazepam as needed for air travel or occasional sleep aid.    BONE HEALTH:  - Ordered bone density scan  (DEXA).    SHORTNESS OF BREATH:  - Ordered chest XR to evaluate shortness of breath.    FOLLOW UP:  - Follow up after [place] trip to discuss results of bone density scan and chest XR.        Future Appointments   Date Time Provider Department Center   12/10/2024 11:00 AM [facility] [id number] LB LIMIT [facility] XRAY OP [facility]   12/10/2024 11:20 AM N[facility], DEXA1 [facility] BMD [facility] ramo     This note was generated with the assistance of ambient listening technology. Verbal consent was obtained by the patient and accompanying visitor(s) for the recording of patient appointment to facilitate this note. I attest to having reviewed and edited the generated note for accuracy, though some syntax or spelling errors may persist. Please contact the author of this note for any clarification.     Lisa Kelley MD  [facility]

## 2024-12-10 ENCOUNTER — HOSPITAL ENCOUNTER (OUTPATIENT)
Dept: RADIOLOGY | Facility: CLINIC | Age: 74
Discharge: HOME | End: 2024-12-10
Attending: INTERNAL MEDICINE
Payer: MEDICARE

## 2024-12-10 ENCOUNTER — HOSPITAL ENCOUNTER (OUTPATIENT)
Dept: RADIOLOGY | Facility: HOSPITAL | Age: 74
Discharge: HOME | End: 2024-12-10
Attending: INTERNAL MEDICINE
Payer: MEDICARE

## 2024-12-10 DIAGNOSIS — M81.0 POSTMENOPAUSAL OSTEOPOROSIS: ICD-10-CM

## 2024-12-10 DIAGNOSIS — R06.09 DOE (DYSPNEA ON EXERTION): ICD-10-CM

## 2024-12-10 PROCEDURE — 71046 X-RAY EXAM CHEST 2 VIEWS: CPT | Mod: TC,FY

## 2024-12-10 PROCEDURE — 77080 DXA BONE DENSITY AXIAL: CPT | Mod: TC

## 2024-12-10 PROCEDURE — 71046 X-RAY EXAM CHEST 2 VIEWS: CPT | Mod: 26,,, | Performed by: RADIOLOGY

## 2024-12-10 PROCEDURE — 77080 DXA BONE DENSITY AXIAL: CPT | Mod: 26,,, | Performed by: INTERNAL MEDICINE

## 2025-02-19 ENCOUNTER — LAB VISIT (OUTPATIENT)
Dept: LAB | Facility: HOSPITAL | Age: 75
End: 2025-02-19
Attending: INTERNAL MEDICINE
Payer: MEDICARE

## 2025-02-19 ENCOUNTER — PATIENT MESSAGE (OUTPATIENT)
Dept: ENDOCRINOLOGY | Facility: CLINIC | Age: 75
End: 2025-02-19

## 2025-02-19 ENCOUNTER — OFFICE VISIT (OUTPATIENT)
Dept: ENDOCRINOLOGY | Facility: CLINIC | Age: 75
End: 2025-02-19
Payer: MEDICARE

## 2025-02-19 VITALS
OXYGEN SATURATION: 96 % | SYSTOLIC BLOOD PRESSURE: 110 MMHG | BODY MASS INDEX: 18.45 KG/M2 | DIASTOLIC BLOOD PRESSURE: 54 MMHG | WEIGHT: 107.5 LBS | HEART RATE: 76 BPM

## 2025-02-19 DIAGNOSIS — Z85.3 HISTORY OF BREAST CANCER: ICD-10-CM

## 2025-02-19 DIAGNOSIS — M81.0 POSTMENOPAUSAL OSTEOPOROSIS: ICD-10-CM

## 2025-02-19 DIAGNOSIS — M81.0 POSTMENOPAUSAL OSTEOPOROSIS: Primary | ICD-10-CM

## 2025-02-19 PROCEDURE — 99999 PR PBB SHADOW E&M-EST. PATIENT-LVL III: CPT | Mod: PBBFAC,,, | Performed by: INTERNAL MEDICINE

## 2025-02-19 PROCEDURE — 99213 OFFICE O/P EST LOW 20 MIN: CPT | Mod: PBBFAC | Performed by: INTERNAL MEDICINE

## 2025-02-19 PROCEDURE — 36415 COLL VENOUS BLD VENIPUNCTURE: CPT | Performed by: INTERNAL MEDICINE

## 2025-02-19 PROCEDURE — G2211 COMPLEX E/M VISIT ADD ON: HCPCS | Mod: S$PBB,,, | Performed by: INTERNAL MEDICINE

## 2025-02-19 NOTE — PROGRESS NOTES
FOLLOW-UP PATIENT VISIT    Subjective:      Chief Complaint: Osteoporosis    HPI: Saritha Guzman is a 74 y.o. female who is here for osteoporosis      Past Medical History:   Diagnosis Date    Atypical ductal hyperplasia, breast 2008    Left    Breast CA 07/2013    left LCIS on bx.'s s/p chemo, masectomy    Family history of stroke     Fibrocystic breast     Goiter     History of endometriosis     Hyperlipidemia     Lobular carcinoma in situ     PONV (postoperative nausea and vomiting)      Referred by Dr. Yadav.  The patient's last visit with me was on 1/31/2024.      With regards to osteoporosis:     Diagnosed with osteoporosis in 2013 based on bone density test.  She has been on a drug holiday recently but unfortunately we have seen a decline in BMD on the most recent study.  No fractures. She remains active playing pickleball. Balance is good.    Past osteoporosis medication:   Reclast - 6 doses (2014, 2015, 2016, 2018, 2019, 2021)    Current osteoporosis medication:   None    Calcium supplements?  600 mg once daily  Consumes dairy products regularly?  Lot of cheese  Eats fresh green vegetables regularly? Plenty    Vit D3 intake?  1000 IU per day      Lab Results   Component Value Date    FRCOXPCO81BS 51 11/05/2024       Weight bearing exercise?   Very active - took a hard fall playing [employer] ball last year and didn't break anything.  Sense of balance? good  Recent falls? 2 falls in the past 2 years - pickle ball accident and trip on sidewalk - no fractures.  Patient is using the following assist devices for ambulation: none  Height loss (>2 inches)? no    Fracture history:  Only one metatarsal fracture 40 years ago.    Tobacco use?  no  EtOH use?   5 oz wine/night     Current diarrhea or h/o malabsorption? no  GERD or stomach ulcers? no  Thyroid disease? no  Anemia? no  Kidney Stones? No      High risk medications include:  none    Post-menopausal? Age?: early 40s  ERT after menopause?: Was on estrogen for  a short while.     Mom or dad with hip/spine fracture or diagnosed with osteoporosis?: No     Malignancy involving bone (active or history)? no  Prior radiation treatment? no  Unexplained elevation of alkaline phosphatase? no    Dental work planned? no    History of Letrozole x 7 years for breast cancer - completed 2021.    Lab Results   Component Value Date    SYUMMSPX89MP 51 11/05/2024    XNTEHTAR24TQ 50 10/26/2023    EQDIXRVU70OE 50 11/09/2022    CALCIUM 9.9 11/05/2024    CALCIUM 9.5 11/03/2023    CALCIUM 9.7 10/26/2023    PHOS 3.3 08/06/2014    ALKPHOS 61 11/05/2024    ALKPHOS 64 10/26/2023    ALKPHOS 63 11/09/2022    TSH 1.737 11/05/2024         Lab Results   Component Value Date    CTELOPEPTIDE 178 01/31/2024         DXA (Year)  Location 11/21/2019  ([facility]) 11/22/2021  ([facility]) 11/27/2023  ([facility]) 12/10/2024  ([facility]) Percent change from previous   L-spine T-score -3.0 -3.0 -2.8 -3.0 (Declined by 3.3% since 2023)   Total Hip T-score -2.6 -2.7 -2.6 -3.0 (Declined by 7.6% since 2023)   Femoral Neck T-score -3.1 -2.8 -2.8 -3.0    FRAX (MOF  /  Hip) 14.8%  /  5.0% 22.3%  /  7.5% 15%  /  5.5% 16%  /  6.4%        Objective:     Vitals:    02/19/25 1116   BP: (!) 110/54   Pulse: 76           BP Readings from Last 5 Encounters:   02/19/25 (!) 110/54   11/05/24 114/60   07/01/24 120/70   04/18/24 106/64   01/31/24 120/80         Physical Exam  Vitals and nursing note reviewed.   Constitutional:       General: She is not in acute distress.     Appearance: She is well-developed. She is not ill-appearing.   HENT:      Head: Normocephalic and atraumatic.   Eyes:      General:         Right eye: No discharge.         Left eye: No discharge.      Conjunctiva/sclera: Conjunctivae normal.   Neck:      Thyroid: No thyromegaly.      Trachea: No tracheal deviation.   Pulmonary:      Effort: Pulmonary effort is normal. No respiratory distress.   Musculoskeletal:      Comments: Thin build.   Neurological:      Mental  Status: She is alert and oriented to person, place, and time.      Coordination: Coordination normal.      Comments: Gait is normal - balance appears good   Psychiatric:         Mood and Affect: Mood normal.         Behavior: Behavior normal.           Wt Readings from Last 3 Encounters:   02/19/25 1116 48.8 kg (107 lb 7.6 oz)   11/05/24 0955 48.1 kg (106 lb)   07/01/24 1036 47.8 kg (105 lb 6.1 oz)       Assessment/Plan:     Postmenopausal osteoporosis  Risk factors include low BMI,  race, menopause, previous aromatase inhibitor use.    RDA of calcium and Vitamin D discussed.  She seems to be getting an appropriate amount of dietary calcium along with 600 mg from her supplement.  Vitamin-D level in October looks great.    Fall precautions emphasized  Weight bearing exercises encouraged      Given recent decline in BMD while on drug holiday, I do think it is a good idea to reinstitute therapy.  She has now completed six doses of Reclast, and I am not sure that additional Reclast is going to be of a lot of benefit.  I have recommended either Evenity or Prolia.     Discussed the risk of osteonecrosis of the jaw with anti resorptive therapy, with incidence estimated from 1-10/222805 treated patients. Discussed that the incidence of ONJ is higher in patients undergoing invasive dental surgery (excludes routine cleaning, fillings or root canals). Dental work should ideally be completed prior to initiation of treatment; and to alert your dentist/oral surgeon if you are planning any invasive dental work while on one of these agents. Preventative measures such as good oral hygiene are encouraged.    Discussed the risk of atypical femur fractures with anti resorptive therapy. The exact incidence is unknown, but has been estimated at approximately 1 in 45477 patients treated with oral bisphosphonates and increasing incidence was correlated with increased duration of bisphosphonate use. Despite this risk, the  significant benefit on fracture reduction far outweighs the potential for this rare event.    Reviewed the potential risk for cardiovascular events with Evenity (romosozumab) and equivocal results in clinical trial. Romosozumab should be avoided if the patient experienced a stroke or MI within the preceding 12 months.     Will check CTX now to compare to the value last year.    History of breast cancer  Prior aromatase inhibitor use is a risk factor for osteoporosis.    She is going to review the options and get back to me once she decides.    Follow up in about 1 year (around 2/19/2026).

## 2025-02-20 NOTE — ASSESSMENT & PLAN NOTE
Risk factors include low BMI,  race, menopause, previous aromatase inhibitor use.    RDA of calcium and Vitamin D discussed.  She seems to be getting an appropriate amount of dietary calcium along with 600 mg from her supplement.  Vitamin-D level in October looks great.    Fall precautions emphasized  Weight bearing exercises encouraged      Given recent decline in BMD while on drug holiday, I do think it is a good idea to reinstitute therapy.  She has now completed six doses of Reclast, and I am not sure that additional Reclast is going to be of a lot of benefit.  I have recommended either Evenity or Prolia.     Discussed the risk of osteonecrosis of the jaw with anti resorptive therapy, with incidence estimated from 1-10/527580 treated patients. Discussed that the incidence of ONJ is higher in patients undergoing invasive dental surgery (excludes routine cleaning, fillings or root canals). Dental work should ideally be completed prior to initiation of treatment; and to alert your dentist/oral surgeon if you are planning any invasive dental work while on one of these agents. Preventative measures such as good oral hygiene are encouraged.    Discussed the risk of atypical femur fractures with anti resorptive therapy. The exact incidence is unknown, but has been estimated at approximately 1 in 55962 patients treated with oral bisphosphonates and increasing incidence was correlated with increased duration of bisphosphonate use. Despite this risk, the significant benefit on fracture reduction far outweighs the potential for this rare event.    Reviewed the potential risk for cardiovascular events with Evenity (romosozumab) and equivocal results in clinical trial. Romosozumab should be avoided if the patient experienced a stroke or MI within the preceding 12 months.     Will check CTX now to compare to the value last year.

## 2025-02-21 ENCOUNTER — RESULTS FOLLOW-UP (OUTPATIENT)
Dept: ENDOCRINOLOGY | Facility: CLINIC | Age: 75
End: 2025-02-21

## 2025-02-21 LAB — COLLAGEN CTX SERPL-MCNC: 233 PG/ML

## 2025-03-24 DIAGNOSIS — Z00.00 ENCOUNTER FOR MEDICARE ANNUAL WELLNESS EXAM: ICD-10-CM

## 2025-04-03 ENCOUNTER — TELEPHONE (OUTPATIENT)
Dept: ENDOCRINOLOGY | Facility: CLINIC | Age: 75
End: 2025-04-03
Payer: MEDICARE

## 2025-04-03 NOTE — TELEPHONE ENCOUNTER
----- Message from Jessica sent at 4/3/2025  9:23 AM CDT -----  Regarding: Advice  Contact: [phone]  Who call ? Saritha Guzman What is the request Details : Pt calling to speak with someone in provider office regarding medications for Osteoporosis. States she has questions. Please call pt back.   Can clinic  use patient portal  : No What number to call back :  [phone]

## 2025-04-09 RX ORDER — ATORVASTATIN CALCIUM 20 MG/1
20 TABLET, FILM COATED ORAL
Qty: 90 TABLET | Refills: 1 | Status: SHIPPED | OUTPATIENT
Start: 2025-04-09

## 2025-04-09 NOTE — TELEPHONE ENCOUNTER
Refill Routing Note   Medication(s) are not appropriate for processing by [facility] for the following reason(s):        No active prescription written by provider    [facility] action(s):  Defer               Appointments  past 12m or future 3m with PCP    Date Provider   Last Visit   11/5/2024 Lisa Yadav MD   Next Visit   Visit date not found Lisa Yadav MD   ED visits in past 90 days: 0        Note composed:2:37 PM 04/09/2025

## 2025-04-09 NOTE — TELEPHONE ENCOUNTER
No care due was identified.  [facility] Embedded Care Due Messages. Reference number: [id number].   4/09/2025 1:32:35 PM CDT

## 2025-04-15 ENCOUNTER — HOSPITAL ENCOUNTER (OUTPATIENT)
Dept: RADIOLOGY | Facility: OTHER | Age: 75
Discharge: HOME | End: 2025-04-15
Attending: INTERNAL MEDICINE
Payer: MEDICARE

## 2025-04-15 ENCOUNTER — CLINICAL SUPPORT (OUTPATIENT)
Dept: INTERNAL MEDICINE | Facility: CLINIC | Age: 75
End: 2025-04-15
Payer: MEDICARE

## 2025-04-15 ENCOUNTER — OFFICE VISIT (OUTPATIENT)
Dept: INTERNAL MEDICINE | Facility: CLINIC | Age: 75
End: 2025-04-15
Payer: MEDICARE

## 2025-04-15 ENCOUNTER — RESULTS FOLLOW-UP (OUTPATIENT)
Dept: INTERNAL MEDICINE | Facility: CLINIC | Age: 75
End: 2025-04-15

## 2025-04-15 VITALS
WEIGHT: 103.63 LBS | HEIGHT: 64 IN | BODY MASS INDEX: 17.69 KG/M2 | SYSTOLIC BLOOD PRESSURE: 94 MMHG | HEART RATE: 83 BPM | DIASTOLIC BLOOD PRESSURE: 58 MMHG

## 2025-04-15 DIAGNOSIS — F51.09 SITUATIONAL INSOMNIA: ICD-10-CM

## 2025-04-15 DIAGNOSIS — K57.90 DIVERTICULOSIS: ICD-10-CM

## 2025-04-15 DIAGNOSIS — R10.32 LEFT LOWER QUADRANT ABDOMINAL PAIN: ICD-10-CM

## 2025-04-15 DIAGNOSIS — R10.9 ABDOMINAL CRAMPING: ICD-10-CM

## 2025-04-15 DIAGNOSIS — R11.0 NAUSEA: ICD-10-CM

## 2025-04-15 DIAGNOSIS — R10.33 PERIUMBILICAL ABDOMINAL PAIN: ICD-10-CM

## 2025-04-15 DIAGNOSIS — R19.7 DIARRHEA, UNSPECIFIED TYPE: ICD-10-CM

## 2025-04-15 DIAGNOSIS — Z85.3 HISTORY OF BREAST CANCER: ICD-10-CM

## 2025-04-15 DIAGNOSIS — R10.32 LEFT LOWER QUADRANT ABDOMINAL PAIN: Primary | ICD-10-CM

## 2025-04-15 LAB
ABSOLUTE EOSINOPHIL (OHS): 0.12 K/UL
ABSOLUTE MONOCYTE (OHS): 0.53 K/UL (ref 0.3–1)
ABSOLUTE NEUTROPHIL COUNT (OHS): 3.54 K/UL (ref 1.8–7.7)
ALBUMIN SERPL BCP-MCNC: 3.8 G/DL (ref 3.5–5.2)
ALP SERPL-CCNC: 56 UNIT/L (ref 40–150)
ALT SERPL W/O P-5'-P-CCNC: 31 UNIT/L (ref 10–44)
AMYLASE SERPL-CCNC: 48 UNIT/L (ref 20–110)
ANION GAP (OHS): 5 MMOL/L (ref 8–16)
AST SERPL-CCNC: 21 UNIT/L (ref 11–45)
BASOPHILS # BLD AUTO: 0.05 K/UL
BASOPHILS NFR BLD AUTO: 0.8 %
BILIRUB SERPL-MCNC: 0.6 MG/DL (ref 0.1–1)
BILIRUB UR QL STRIP.AUTO: NEGATIVE
BUN SERPL-MCNC: 21 MG/DL (ref 8–23)
CALCIUM SERPL-MCNC: 9.1 MG/DL (ref 8.7–10.5)
CHLORIDE SERPL-SCNC: 108 MMOL/L (ref 95–110)
CLARITY UR: CLEAR
CO2 SERPL-SCNC: 26 MMOL/L (ref 23–29)
COLOR UR AUTO: YELLOW
CREAT SERPL-MCNC: 0.9 MG/DL (ref 0.5–1.4)
ERYTHROCYTE [DISTWIDTH] IN BLOOD BY AUTOMATED COUNT: 12.4 % (ref 11.5–14.5)
GFR SERPLBLD CREATININE-BSD FMLA CKD-EPI: >60 ML/MIN/1.73/M2
GLUCOSE SERPL-MCNC: 98 MG/DL (ref 70–110)
GLUCOSE UR QL STRIP: NEGATIVE
HCT VFR BLD AUTO: 38.8 % (ref 37–48.5)
HGB BLD-MCNC: 12.3 GM/DL (ref 12–16)
HGB UR QL STRIP: NEGATIVE
HOLD SPECIMEN: NORMAL
IMM GRANULOCYTES # BLD AUTO: 0.01 K/UL (ref 0–0.04)
IMM GRANULOCYTES NFR BLD AUTO: 0.2 % (ref 0–0.5)
KETONES UR QL STRIP: NEGATIVE
LEUKOCYTE ESTERASE UR QL STRIP: ABNORMAL
LIPASE SERPL-CCNC: 27 U/L (ref 4–60)
LYMPHOCYTES # BLD AUTO: 2.29 K/UL (ref 1–4.8)
MCH RBC QN AUTO: 31.5 PG (ref 27–31)
MCHC RBC AUTO-ENTMCNC: 31.7 G/DL (ref 32–36)
MCV RBC AUTO: 99 FL (ref 82–98)
MICROSCOPIC COMMENT: NORMAL
NITRITE UR QL STRIP: NEGATIVE
NUCLEATED RBC (/100WBC) (OHS): 0 /100 WBC
PH UR STRIP: 6 [PH]
PLATELET # BLD AUTO: 258 K/UL (ref 150–450)
PMV BLD AUTO: 10.6 FL (ref 9.2–12.9)
POTASSIUM SERPL-SCNC: 4.2 MMOL/L (ref 3.5–5.1)
PROT SERPL-MCNC: 6.7 GM/DL (ref 6–8.4)
PROT UR QL STRIP: NEGATIVE
RBC # BLD AUTO: 3.91 M/UL (ref 4–5.4)
RBC #/AREA URNS AUTO: <1 /HPF (ref 0–4)
RELATIVE EOSINOPHIL (OHS): 1.8 %
RELATIVE LYMPHOCYTE (OHS): 35 % (ref 18–48)
RELATIVE MONOCYTE (OHS): 8.1 % (ref 4–15)
RELATIVE NEUTROPHIL (OHS): 54.1 % (ref 38–73)
SODIUM SERPL-SCNC: 139 MMOL/L (ref 136–145)
SP GR UR STRIP: 1.01
UROBILINOGEN UR STRIP-ACNC: NEGATIVE EU/DL
WBC # BLD AUTO: 6.54 K/UL (ref 3.9–12.7)
WBC #/AREA URNS AUTO: 2 /HPF (ref 0–5)

## 2025-04-15 PROCEDURE — 36415 COLL VENOUS BLD VENIPUNCTURE: CPT

## 2025-04-15 PROCEDURE — 82150 ASSAY OF AMYLASE: CPT

## 2025-04-15 PROCEDURE — 81003 URINALYSIS AUTO W/O SCOPE: CPT

## 2025-04-15 PROCEDURE — 25500020 PHARM REV CODE 255: Performed by: INTERNAL MEDICINE

## 2025-04-15 PROCEDURE — 74177 CT ABD & PELVIS W/CONTRAST: CPT | Mod: TC

## 2025-04-15 PROCEDURE — 99213 OFFICE O/P EST LOW 20 MIN: CPT | Mod: PBBFAC,25 | Performed by: INTERNAL MEDICINE

## 2025-04-15 PROCEDURE — 99999 PR PBB SHADOW E&M-EST. PATIENT-LVL III: CPT | Mod: PBBFAC,,, | Performed by: INTERNAL MEDICINE

## 2025-04-15 PROCEDURE — 82947 ASSAY GLUCOSE BLOOD QUANT: CPT

## 2025-04-15 PROCEDURE — 99214 OFFICE O/P EST MOD 30 MIN: CPT | Mod: S$PBB,,, | Performed by: INTERNAL MEDICINE

## 2025-04-15 PROCEDURE — 85025 COMPLETE CBC W/AUTO DIFF WBC: CPT

## 2025-04-15 PROCEDURE — 74177 CT ABD & PELVIS W/CONTRAST: CPT | Mod: 26,,, | Performed by: RADIOLOGY

## 2025-04-15 PROCEDURE — 83690 ASSAY OF LIPASE: CPT

## 2025-04-15 RX ORDER — CIPROFLOXACIN 500 MG/1
500 TABLET ORAL EVERY 12 HOURS
Qty: 14 TABLET | Refills: 0 | Status: SHIPPED | OUTPATIENT
Start: 2025-04-15

## 2025-04-15 RX ORDER — METRONIDAZOLE 500 MG/1
500 TABLET ORAL EVERY 12 HOURS
Qty: 14 TABLET | Refills: 0 | Status: SHIPPED | OUTPATIENT
Start: 2025-04-15

## 2025-04-15 RX ADMIN — IOHEXOL 65 ML: 350 INJECTION, SOLUTION INTRAVENOUS at 04:04

## 2025-04-16 ENCOUNTER — PATIENT MESSAGE (OUTPATIENT)
Dept: INTERNAL MEDICINE | Facility: CLINIC | Age: 75
End: 2025-04-16
Payer: MEDICARE

## 2025-04-16 DIAGNOSIS — F51.09 SITUATIONAL INSOMNIA: Primary | ICD-10-CM

## 2025-04-16 RX ORDER — ONDANSETRON 4 MG/1
4 TABLET, ORALLY DISINTEGRATING ORAL EVERY 8 HOURS PRN
Qty: 21 TABLET | Refills: 0 | Status: SHIPPED | OUTPATIENT
Start: 2025-04-16

## 2025-04-16 RX ORDER — ESZOPICLONE 1 MG/1
1 TABLET, FILM COATED ORAL NIGHTLY PRN
Qty: 30 TABLET | Refills: 1 | Status: SHIPPED | OUTPATIENT
Start: 2025-04-16 | End: 2025-05-16

## 2025-04-20 NOTE — PROGRESS NOTES
"Subjective:       Patient ID: Saritha Guzman is a 74 y.o. female who presents today for:    Chief Complaint:   Chief Complaint   Patient presents with    Abdominal Pain    Nausea    Diarrhea    Constipation       History of Present Illness    CHIEF COMPLAINT:  Patient presents today for abdominal pain and GI issues.    HISTORY OF PRESENT ILLNESS:  She experienced sudden onset of intense abdominal pain approximately 2.5 weeks ago, waking at 2 AM, describing it as sharp pain similar to "shards of glass" in her abdomen. She reports sharp, quick abdominal pains, nausea, and bloating. Tea consumption has been causing stomach discomfort, and she experiences heartburn particularly when bloated.    GI SYMPTOMS:  She reports constipation, which she notes is unusual and painful, similar to her previous experience during chemotherapy. She took Colace for about a week with relief. Subsequently, she developed lumpy, watery diarrhea with cramping and excessive gas with occasional belching. Her most recent bowel movement was slow, uncomfortable with thin, scant stool. She has a history of diverticulosis diagnosed in 2020 but denies typical diverticular pain.    SLEEP:  She reports disturbed sleep patterns potentially exacerbated by stomach problems. Melatonin 5mg was attempted but discontinued due to concerns about stomach cramps. Balsama provided mixed results with variable wake times. Most recently, half of 1mg azasan resulted in uninterrupted sleep from 10 PM to 7:30 AM, though she expresses concerns about potential addiction.    DIET AND EXERCISE:  She has eliminated sugar from her diet. Breakfast consists of an English muffin with butter and local honey. She drinks kombucha for probiotic benefits and one 5-ounce glass of wine with dinner. She regularly plays pickleball, though recently reduced activity due to abdominal cramps, but maintains ability to walk two miles.    MEDICAL HISTORY:  She has a history of cancer with " previous chemotherapy treatment, during which she experienced severe constipation managed with Colace.    FAMILY HISTORY:  Mother has history of recurrent diverticulitis.    ALLERGIES:  She reports allergies to penicillin, tetracycline, and codeine. Reports adverse reaction to steroids causing agitation.    ASSOCIATED SYMPTOMS:  She reports brain fog and questions if it may be related to poor appetite and inadequate nutrition due to GI issues.      ROS:  General: -fever, -chills, -fatigue, -weight gain, +weight loss  Eyes: -vision changes, -redness, -discharge  ENT: -ear pain, -nasal congestion, -sore throat  Cardiovascular: -chest pain, -palpitations, -lower extremity edema  Respiratory: -cough, -shortness of breath  Gastrointestinal: +abdominal pain, +nausea, -vomiting, +diarrhea, +constipation, -blood in stool, +excessive belching, +heartburn  Genitourinary: -dysuria, -hematuria, -frequency  Musculoskeletal: -joint pain, -muscle pain  Skin: -rash, -lesion  Neurological: -headache, -dizziness, -numbness, -tingling, +confusion or disorientation, +thought or thinking problems or concerns, +difficulty staying asleep  Psychiatric: -anxiety, -depression, -sleep difficulty           Medications:  Encounter Medications[1]    Allergies:  Review of patient's allergies indicates:   Allergen Reactions    Tetracyclines Nausea And Vomiting    Codeine     Corticosteroids (glucocorticoids)      Oral version puts in manic state    Penicillins        Health Maintenance:  Immunization History   Administered Date(s) Administered    COVID-19 Vaccine 11/18/2023, 09/25/2024    COVID-19, MRNA, LN-S, PF (Pfizer) (Gray Cap) 04/05/2022    COVID-19, MRNA, LN-S, PF (Pfizer) (Purple Cap) 01/05/2021, 01/26/2021, 09/15/2021    COVID-19, mRNA, LNP-S, PF, gabi-sucrose, 30 mcg/0.3 mL (Pfizer Ages 12+) 11/18/2023    COVID-19, mRNA, LNP-S, bivalent booster, PF (PFIZER OMICRON) 10/19/2022    DTaP 01/26/2012    Hepatitis A 01/26/2012    Hepatitis A,  "Pediatric/Adolescent, 2 Dose 01/26/2012    Influenza 09/22/2019, 10/09/2023    Influenza (FLUAD) - Quadrivalent - Adjuvanted - PF *Preferred* (65+) 10/08/2021, 09/22/2022    Influenza - Quadrivalent - PF *Preferred* (6 months and older) 08/14/2014, 08/17/2020    Influenza - Trivalent - Afluria, Fluzone MDV 08/14/2014    Influenza - Trivalent - Fluad - Adjuvanted - PF (65 years and older 09/22/2019    Influenza - Trivalent - Fluarix, Flulaval, Fluzone, Afluria - PF 08/17/2020    Influenza - Trivalent - Fluzone High Dose - PF (65 years and older) 09/21/2015, 09/17/2016, 08/24/2017, 08/25/2017, 09/22/2018, 08/17/2020, 10/09/2023    Influenza A (H1N1) 2009 Monovalent - IM 11/23/2009    Influenza Split 08/14/2014, 09/21/2015    Influenza Whole 08/23/2010, 08/22/2013    Pneumococcal Conjugate - 13 Valent 07/31/2017    Pneumococcal Conjugate - 20 Valent 11/05/2024    Pneumococcal Polysaccharide - 23 Valent 01/05/2010, 12/14/2010    RSV IGIV 09/25/2023    Rsv, Bivalent, Rsvpref (Abrysvo) 09/25/2023    Tdap 05/05/2022    Tetanus 05/05/2022    Yellow Fever 07/31/2017    Zoster 10/22/2008    Zoster Recombinant 07/18/2018, 07/21/2018, 07/21/2018, 09/29/2018, 09/29/2018      Health Maintenance   Topic Date Due    Hepatitis C Screening  Never done    High Dose Statin  04/15/2026    DEXA Scan  12/10/2026    Lipid Panel  11/05/2029    TETANUS VACCINE  05/05/2032    Shingles Vaccine  Completed    Influenza Vaccine  Completed    COVID-19 Vaccine  Completed    RSV Vaccine (Age 60+ and Pregnant patients)  Completed    Pneumococcal Vaccines (Age 50+)  Completed    Aspirin/Antiplatelet Therapy  Discontinued          Objective:      Vital Signs  Pulse: 83  BP: (!) 94/58  Patient Position: Sitting  Pain Score: 0-No pain  Height and Weight  Height: 5' 4" (162.6 cm)  Weight: 47 kg (103 lb 9.9 oz)  BSA (Calculated - sq m): 1.46 sq meters  BMI (Calculated): 17.8  Weight in (lb) to have BMI = 25: 145.3]    Physical Exam   Physical Exam  "   Vitals: Blood pressure: 90/54. Hypotensive.  General: No acute distress. Well-developed. Well-nourished.  Eyes: EOMI. Sclerae anicteric.  HENT: Normocephalic. Atraumatic.  Cardiovascular: Regular rate. Regular rhythm. No murmurs. No rubs. No gallops. Normal S1, S2.  Respiratory: Normal respiratory effort. Clear to auscultation bilaterally. No rales. No rhonchi. No wheezing.  Abdomen: Soft. Tenderness in abdomen. No tenderness in gallbladder. Non-distended. Normoactive bowel sounds.  Musculoskeletal: No  obvious deformity.  Extremities: No lower extremity edema.  Neurological: Alert & oriented x3. No slurred speech. Normal gait.  Psychiatric: Normal mood. Normal affect. Good insight. Good judgment.  Skin: Warm. Dry. No rash.        Assessment/plan:     Saritha Guzman is a 74 y.o.female with:    Left lower quadrant abdominal pain  -     Cancel: CT Abdomen Pelvis W Wo Contrast; Future; Expected date: 04/15/2025  -     metroNIDAZOLE (FLAGYL) 500 MG tablet; Take 1 tablet (500 mg total) by mouth every 12 (twelve) hours.  Dispense: 14 tablet; Refill: 0  -     ciprofloxacin HCl (CIPRO) 500 MG tablet; Take 1 tablet (500 mg total) by mouth every 12 (twelve) hours.  Dispense: 14 tablet; Refill: 0  -     CT Abdomen Pelvis With IV Contrast NO Oral Contrast; Future; Expected date: 04/15/2025    Diarrhea, unspecified type, abdominal cramps, nausea times three weeks   -     CT Abdomen Pelvis With IV Contrast NO Oral Contrast; Future; Expected date: 04/15/2025    History of diverticulosis   -     Urinalysis, Reflex to Urine Culture; Future; Expected date: 04/15/2025  -     CT Abdomen Pelvis With IV Contrast NO Oral Contrast; Future; Expected date: 04/15/2025    History of breast cancer  -     CT Abdomen Pelvis With IV Contrast NO Oral Contrast; Future; Expected date: 04/15/2025    Situational insomnia   Trial of lunesta     Assessment & Plan    C50.412 Malignant neoplasm of upper-outer quadrant of left female breast, unspecified  estrogen receptor status  K57.32 Diverticulitis of large intestine without perforation or abscess without bleeding  K57.30 Diverticulosis of large intestine without perforation or abscess without bleeding  I95.9 Hypotension, unspecified  R10.84 Generalized abdominal pain  R19.7 Diarrhea, unspecified  G47.9 Sleep disorder, unspecified  Z85.9 Personal history of malignant neoplasm, unspecified  Z88.0 Allergy status to penicillin  Z88.1 Allergy status to other antibiotic agents  Z88.6 Allergy status to analgesic agent  Z84.89 Family history of other specified conditions    IMPRESSION:  - Suspect diverticulitis as primary diagnosis based on symptoms, including left lower abdominal pain and alternating bowel habits.  - Considered gallbladder issues as secondary possibility, though less likely due to left-sided pain and negative Slaughter's sign.  - Considered ulcer as tertiary diagnosis, though patient does not excessively use NSAIDs.  - Ruled out norovirus due to lack of community spread and alternating bowel symptoms.    MALIGNANT NEOPLASM OF UPPER-OUTER QUADRANT OF LEFT FEMALE BREAST, UNSPECIFIED ESTROGEN RECEPTOR STATUS:  - Acknowledged the patient's history of cancer and chemotherapy treatment.  - Noted weight loss of approximately 2 lbs.  - Ordered abdominal CT and labs to rule out any cancer-related issues.    DIVERTICULOSIS OF LARGE INTESTINE:  - Suspected diverticulitis as the primary diagnosis, considering the patient's symptoms and previous history of diverticulosis.  - Explained diverticulitis, its presentation, and potential treatment options to the patient.  - Prescribed Ciprofloxacin and Metronidazole (Flagyl) for potential diverticulitis, to be filled at pharmacy but not started until after CT results and further instruction.  - If diverticulitis is confirmed, recommended a bland diet: avoid roughage, limit dairy, avoid fried and raw foods.  - Advised increasing fluid intake, including water, broths,  non-acidic teas, and electrolyte solutions.    DIVERTICULOSIS OF LARGE INTESTINE:  - Noted the patient's history of diverticulosis in the sigmoid colon, as documented in a previous colonoscopy in 2020.    HYPOTENSION- ASYMPTOMATIC  - Noted the patient's reported low blood pressure of 90/54.  - Assessed that while low, this is considered normal for this patient.    GENERALIZED ABDOMINAL PAIN:  - Evaluated the patient's symptoms of intense abdominal pains (described as sharp and like 'shards of glass'), constipation, diarrhea, fogginess, nausea, and weight loss over the past 2-2.5 weeks.  - Performed physical exam, noting abdominal tenderness, particularly on the left side, with some bowel sounds present but not hyperactive.    DIARRHEA:  - Noted the patient's report of experiencing diarrhea, described as lumpy and watery with cramping.    SLEEP DISORDER:  - Evaluated the patient's report of difficulty sleeping, including waking up in the middle of the night and inability to return to sleep.  - Acknowledged that sleep issues are likely exacerbated by stomach problems.  - Noted the patient's use of melatonin (5mg) and balsama for sleep, with mixed results.  - Suggested using low-dose azasan (0.5mg) intermittently for sleep management.    ALLERGY TO PENICILLIN:  - Noted the patient's known allergy to penicillin.  - Considered alternative antibiotics due to this allergy.    ALLERGY TO OTHER ANTIBIOTICS:  - Noted the patient's known allergy to tetracycline.    ALLERGY TO ANALGESICS:  - Noted the patient's known allergy or intolerance to codeine.    FAMILY HISTORY OF DIVERTICULITIS:  - Noted the patient's family history of dive  rticulitis in her mother.    FOLLOW-UP:  - Instructed the patient to follow up after CT Abdomen, preferably the next morning.  - Advised the patient to contact the office after CT for further instructions on whether to start antibiotics.        Future Appointments   Date Time Provider Department  [place]   6/6/2025  8:00 AM Diana Tubbs FNP [facility]     This note was generated with the assistance of ambient listening technology. Verbal consent was obtained by the patient and accompanying visitor(s) for the recording of patient appointment to facilitate this note. I attest to having reviewed and edited the generated note for accuracy, though some syntax or spelling errors may persist. Please contact the author of this note for any clarification.     Lisa Kelley MD  [facility]       [1]   Outpatient Encounter Medications as of 4/15/2025   Medication Sig Dispense Refill    atorvastatin (LIPITOR) 20 MG tablet TAKE 1 TABLET(20 MG) BY MOUTH EVERY DAY 90 tablet 1    calcium-vitamin D3 (OS-CHIN 500 + D3) 500 mg-5 mcg (200 unit) per tablet Take 1 tablet by mouth 2 (two) times daily with meals.      cholecalciferol, vitamin D3, (VITAMIN D3) 25 mcg (1,000 unit) capsule Take 2,000 Units by mouth once daily.      coenzyme Q10 200 mg capsule Take 200 mg by mouth once daily.      cyanocobalamin (VITAMIN B-12) 1000 MCG tablet Take 1,000 mcg by mouth once daily.      ezetimibe (ZETIA) 10 mg tablet TAKE 1 TABLET(10 MG) BY MOUTH EVERY DAY 90 tablet 2    LORazepam (ATIVAN) 1 MG tablet TAKE 1 TABLET(1 MG) BY MOUTH EVERY 12 HOURS AS NEEDED 30 tablet 2    prasterone, dhea, (INTRAROSA) 6.5 mg Inst USE ONE INSERT VAGINALLY THREE DAYS A WEEK 30 each 11    ciprofloxacin HCl (CIPRO) 500 MG tablet Take 1 tablet (500 mg total) by mouth every 12 (twelve) hours. 14 tablet 0    metroNIDAZOLE (FLAGYL) 500 MG tablet Take 1 tablet (500 mg total) by mouth every 12 (twelve) hours. 14 tablet 0     No facility-administered encounter medications on file as of 4/15/2025.

## 2025-05-23 ENCOUNTER — PATIENT MESSAGE (OUTPATIENT)
Dept: ENDOCRINOLOGY | Facility: CLINIC | Age: 75
End: 2025-05-23
Payer: MEDICARE

## 2025-06-03 ENCOUNTER — TELEPHONE (OUTPATIENT)
Dept: INTERNAL MEDICINE | Facility: CLINIC | Age: 75
End: 2025-06-03
Payer: MEDICARE

## 2025-06-03 ENCOUNTER — TELEPHONE (OUTPATIENT)
Dept: ENDOCRINOLOGY | Facility: CLINIC | Age: 75
End: 2025-06-03
Payer: MEDICARE

## 2025-06-05 PROBLEM — C50.412 MALIGNANT NEOPLASM OF UPPER-OUTER QUADRANT OF LEFT FEMALE BREAST, UNSPECIFIED ESTROGEN RECEPTOR STATUS: Status: ACTIVE | Noted: 2025-06-05

## 2025-06-10 ENCOUNTER — PATIENT MESSAGE (OUTPATIENT)
Dept: ENDOCRINOLOGY | Facility: CLINIC | Age: 75
End: 2025-06-10
Payer: MEDICARE

## 2025-06-16 ENCOUNTER — PATIENT MESSAGE (OUTPATIENT)
Dept: HEMATOLOGY/ONCOLOGY | Facility: CLINIC | Age: 75
End: 2025-06-16
Payer: MEDICARE

## 2025-07-12 DIAGNOSIS — N95.2 VAGINAL ATROPHY: ICD-10-CM

## 2025-07-13 RX ORDER — PRASTERONE 6.5 MG/1
INSERT VAGINAL
Qty: 30 EACH | Refills: 11 | Status: SHIPPED | OUTPATIENT
Start: 2025-07-13

## 2025-07-13 NOTE — TELEPHONE ENCOUNTER
Refill Routing Note   Medication(s) are not appropriate for processing by [facility] for the following reason(s):        Outside of protocol    [facility] action(s):  Route               Appointments  past 12m or future 3m with PCP    Date Provider   Last Visit   3/9/2023 Emi Cordero MD   Next Visit   Visit date not found Emi Cordero MD   ED visits in past 90 days: 0        Note composed:12:33 PM 07/13/2025

## 2025-08-01 ENCOUNTER — OFFICE VISIT (OUTPATIENT)
Dept: ENDOCRINOLOGY | Facility: CLINIC | Age: 75
End: 2025-08-01
Payer: MEDICARE

## 2025-08-01 VITALS
DIASTOLIC BLOOD PRESSURE: 77 MMHG | BODY MASS INDEX: 17.88 KG/M2 | WEIGHT: 104.19 LBS | HEART RATE: 77 BPM | SYSTOLIC BLOOD PRESSURE: 131 MMHG

## 2025-08-01 DIAGNOSIS — Z85.3 HISTORY OF BREAST CANCER: ICD-10-CM

## 2025-08-01 DIAGNOSIS — M81.0 POSTMENOPAUSAL OSTEOPOROSIS: Primary | ICD-10-CM

## 2025-08-01 PROCEDURE — 99213 OFFICE O/P EST LOW 20 MIN: CPT | Mod: PBBFAC | Performed by: INTERNAL MEDICINE

## 2025-08-01 PROCEDURE — 99999 PR PBB SHADOW E&M-EST. PATIENT-LVL III: CPT | Mod: PBBFAC,,, | Performed by: INTERNAL MEDICINE

## 2025-08-01 RX ORDER — ALENDRONATE SODIUM 70 MG/1
70 TABLET ORAL
Qty: 12 TABLET | Refills: 4 | Status: SHIPPED | OUTPATIENT
Start: 2025-08-01

## 2025-08-01 NOTE — PROGRESS NOTES
FOLLOW-UP PATIENT VISIT    Subjective:      Chief Complaint: Osteoporosis    HPI: Sraitha Guzman is a 75 y.o. female who is here for osteoporosis      Past Medical History:   Diagnosis Date    Atypical ductal hyperplasia, breast 2008    Left    Breast CA 07/2013    left LCIS on bx.'s s/p chemo, masectomy    Family history of stroke     Fibrocystic breast     Goiter     History of endometriosis     Hyperlipidemia     Lobular carcinoma in situ     PONV (postoperative nausea and vomiting)      Referred by Dr. Yadav.  The patient's last visit with me was on 2/19/2025.        With regards to osteoporosis:     8/1/2025:  Last visit I had recommended Prolia or Evenity for management of osteoporosis. However, she did some reading and is concerned about side-effects with both injections. She would prefer oral medication if this is safe and still effective. She is exercising regularly (walking and some swimming) and eating a healthy diet with dairy products, fresh greens and is taking calcium + Vitamin D. She saw her dentist recently and had full mouth X-rays done. She was told her X-rays are normal and her dentist is fine with her taking OP meds as well.    Diagnosed with osteoporosis in 2013 based on bone density test.  She has been on a drug holiday recently but unfortunately we have seen a decline in BMD on the most recent study.  No fractures. She remains active playing pickleball. Balance is good.    Past osteoporosis medication:   Reclast - 6 doses (2014, 2015, 2016, 2018, 2019, 2021)    Current osteoporosis medication:   None    Calcium supplements?  600 mg once daily  Consumes dairy products regularly? Lot of cheese  Eats fresh green vegetables regularly? Plenty    Vit D3 intake?  1000 IU per day      Lab Results   Component Value Date    [id number] 51 11/05/2024       Weight bearing exercise?   Very active - took a hard fall playing pickle ball last year and didn't break anything.  Sense of balance?  good  Recent falls? 2 falls in the past 2 years - pickle ball accident and trip on sidewalk - no fractures.  Patient is using the following assist devices for ambulation: none  Height loss (>2 inches)? no    Fracture history:  Only one metatarsal fracture 40 years ago.    Tobacco use?  no  EtOH use?   5 oz wine/night     Current diarrhea or h/o malabsorption? no  GERD or stomach ulcers? no  Thyroid disease? no  Anemia? no  Kidney Stones? No      High risk medications include:  none    Post-menopausal? Age?: early 40s  ERT after menopause?: Was on estrogen for a short while.     Mom or dad with hip/spine fracture or diagnosed with osteoporosis?: No     Malignancy involving bone (active or history)? no  Prior radiation treatment? no  Unexplained elevation of alkaline phosphatase? no    Dental work planned? no    History of Letrozole x 7 years for breast cancer - completed 2021.    Lab Results   Component Value Date    [id number] 51 11/05/2024    [id number] 50 10/26/2023    [id number] 50 11/09/2022    CALCIUM 9.1 04/15/2025    CALCIUM 9.9 11/05/2024    CALCIUM 9.5 11/03/2023    PHOS 3.3 08/06/2014    ALKPHOS 56 04/15/2025    ALKPHOS 61 11/05/2024    ALKPHOS 64 10/26/2023    TSH 1.737 11/05/2024       Lab Results   Component Value Date    CTELOPEPTIDE 233 02/19/2025    CTELOPEPTIDE 178 01/31/2024       DXA (Year)  Location 11/21/2019  ([facility]) 11/22/2021  ([facility]) 11/27/2023  ([facility]) 12/10/2024  ([facility]) Percent change from previous   L-spine T-score -3.0 -3.0 -2.8 -3.0 (Declined by 3.3% since 2023)   Total Hip T-score -2.6 -2.7 -2.6 -3.0 (Declined by 7.6% since 2023)   Femoral Neck T-score -3.1 -2.8 -2.8 -3.0    FRAX (MOF  /  Hip) 14.8%  /  5.0% 22.3%  /  7.5% 15%  /  5.5% 16%  /  6.4%        Objective:     Vitals:    08/01/25 0951   BP: 131/77   Pulse: 77           BP Readings from Last 5 Encounters:   08/01/25 131/77   04/15/25 (!) 94/58   02/19/25 (!) 110/54   11/05/24 114/60   07/01/24 120/70          Physical Exam  Vitals and nursing note reviewed.   Constitutional:       General: She is not in acute distress.     Appearance: She is well-developed. She is not ill-appearing.   HENT:      Head: Normocephalic and atraumatic.   Eyes:      General:         Right eye: No discharge.         Left eye: No discharge.      Conjunctiva/sclera: Conjunctivae normal.   Neck:      Thyroid: No thyromegaly.      Trachea: No tracheal deviation.   Pulmonary:      Effort: Pulmonary effort is normal. No respiratory distress.   Musculoskeletal:      Comments: Thin build.   Neurological:      Mental Status: She is alert and oriented to person, place, and time.      Coordination: Coordination normal.      Comments: Gait is normal - balance appears good   Psychiatric:         Mood and Affect: Mood normal.         Behavior: Behavior normal.           Wt Readings from Last 3 Encounters:   08/01/25 0951 47.3 kg (104 lb 2.7 oz)   04/15/25 1336 47 kg (103 lb 9.9 oz)   02/19/25 1116 48.8 kg (107 lb 7.6 oz)       Assessment/Plan:     Postmenopausal osteoporosis  Risk factors include low BMI,  race, menopause, previous aromatase inhibitor use.    RDA of calcium and Vitamin D discussed.  She seems to be getting an appropriate amount of dietary calcium along with 600 mg from her supplement.  Vitamin-D level in October looks great.    Fall precautions emphasized  Weight bearing exercises encouraged      Given recent decline in BMD while on drug holiday, I do think it is a good idea to reinstitute therapy. Although she meets criteria for parenteral osteoporosis therapy, she has concerns about side effects and would prefer oral medication.  We discussed the comparative efficacy of alendronate versus Prolia and Evenity.  Although alendronate may be somewhat less effective, it is still helpful in reducing the risk of vertebral and hip fractures.  We discussed the proper way to take alendronate on an empty stomach with a full glass of  water and remaining upright for 1-2 hours afterwards.  Discussed common and uncommon side effects of alendronate.  She has concerns about whether or not this will be effective, so we will plan to check a follow-up CTX level in six months.    Discussed the risk of osteonecrosis of the jaw with anti resorptive therapy, with incidence estimated from 1-10/615846 treated patients. Discussed that the incidence of ONJ is higher in patients undergoing invasive dental surgery (excludes routine cleaning, fillings or root canals). Dental work should ideally be completed prior to initiation of treatment; and to alert your dentist/oral surgeon if you are planning any invasive dental work while on one of these agents. Preventative measures such as good oral hygiene are encouraged.    Discussed the risk of atypical femur fractures with anti resorptive therapy. The exact incidence is unknown, but has been estimated at approximately 1 in 19715 patients treated with oral bisphosphonates and increasing incidence was correlated with increased duration of bisphosphonate use. Despite this risk, the significant benefit on fracture reduction far outweighs the potential for this rare event.        History of breast cancer  Prior aromatase inhibitor use is a risk factor for osteoporosis.      Follow up in about 1 year (around 8/1/2026).

## 2025-08-01 NOTE — ASSESSMENT & PLAN NOTE
Risk factors include low BMI,  race, menopause, previous aromatase inhibitor use.    RDA of calcium and Vitamin D discussed.  She seems to be getting an appropriate amount of dietary calcium along with 600 mg from her supplement.  Vitamin-D level in October looks great.    Fall precautions emphasized  Weight bearing exercises encouraged      Given recent decline in BMD while on drug holiday, I do think it is a good idea to reinstitute therapy. Although she meets criteria for parenteral osteoporosis therapy, she has concerns about side effects and would prefer oral medication.  We discussed the comparative efficacy of alendronate versus Prolia and Evenity.  Although alendronate may be somewhat less effective, it is still helpful in reducing the risk of vertebral and hip fractures.  We discussed the proper way to take alendronate on an empty stomach with a full glass of water and remaining upright for 1-2 hours afterwards.  Discussed common and uncommon side effects of alendronate.  She has concerns about whether or not this will be effective, so we will plan to check a follow-up CTX level in six months.    Discussed the risk of osteonecrosis of the jaw with anti resorptive therapy, with incidence estimated from 1-10/188608 treated patients. Discussed that the incidence of ONJ is higher in patients undergoing invasive dental surgery (excludes routine cleaning, fillings or root canals). Dental work should ideally be completed prior to initiation of treatment; and to alert your dentist/oral surgeon if you are planning any invasive dental work while on one of these agents. Preventative measures such as good oral hygiene are encouraged.    Discussed the risk of atypical femur fractures with anti resorptive therapy. The exact incidence is unknown, but has been estimated at approximately 1 in 88414 patients treated with oral bisphosphonates and increasing incidence was correlated with increased duration of  bisphosphonate use. Despite this risk, the significant benefit on fracture reduction far outweighs the potential for this rare event.

## 2025-08-06 DIAGNOSIS — D05.02 LOBULAR CARCINOMA IN SITU (LCIS) OF LEFT BREAST: Primary | ICD-10-CM

## 2025-08-06 DIAGNOSIS — Z12.39 ENCOUNTER FOR SCREENING FOR MALIGNANT NEOPLASM OF BREAST, UNSPECIFIED SCREENING MODALITY: ICD-10-CM

## 2025-08-06 DIAGNOSIS — Z12.31 ENCOUNTER FOR SCREENING MAMMOGRAM FOR MALIGNANT NEOPLASM OF BREAST: ICD-10-CM

## 2025-08-07 ENCOUNTER — PATIENT MESSAGE (OUTPATIENT)
Dept: INTERNAL MEDICINE | Facility: CLINIC | Age: 75
End: 2025-08-07
Payer: MEDICARE

## 2025-08-08 DIAGNOSIS — F51.09 SITUATIONAL INSOMNIA: Primary | ICD-10-CM

## 2025-08-08 RX ORDER — ESZOPICLONE 1 MG/1
1 TABLET, FILM COATED ORAL NIGHTLY PRN
Qty: 30 TABLET | Refills: 0 | Status: SHIPPED | OUTPATIENT
Start: 2025-08-08 | End: 2025-09-07

## 2025-08-12 ENCOUNTER — HOSPITAL ENCOUNTER (OUTPATIENT)
Dept: RADIOLOGY | Facility: HOSPITAL | Age: 75
Discharge: HOME | End: 2025-08-12
Attending: INTERNAL MEDICINE
Payer: MEDICARE

## 2025-08-12 DIAGNOSIS — Z12.39 ENCOUNTER FOR SCREENING FOR MALIGNANT NEOPLASM OF BREAST, UNSPECIFIED SCREENING MODALITY: ICD-10-CM

## 2025-08-12 DIAGNOSIS — Z12.31 ENCOUNTER FOR SCREENING MAMMOGRAM FOR MALIGNANT NEOPLASM OF BREAST: ICD-10-CM

## 2025-08-12 PROCEDURE — 77067 SCR MAMMO BI INCL CAD: CPT | Mod: TC,52

## 2025-08-21 DIAGNOSIS — F51.09 SITUATIONAL INSOMNIA: Primary | ICD-10-CM

## 2025-08-21 RX ORDER — ESZOPICLONE 2 MG/1
2 TABLET, FILM COATED ORAL NIGHTLY
Qty: 30 TABLET | Refills: 1 | Status: SHIPPED | OUTPATIENT
Start: 2025-08-21 | End: 2025-09-20